# Patient Record
Sex: MALE | Race: WHITE | NOT HISPANIC OR LATINO
[De-identification: names, ages, dates, MRNs, and addresses within clinical notes are randomized per-mention and may not be internally consistent; named-entity substitution may affect disease eponyms.]

---

## 2018-01-16 ENCOUNTER — APPOINTMENT (OUTPATIENT)
Dept: ORTHOPEDIC SURGERY | Facility: CLINIC | Age: 68
End: 2018-01-16

## 2020-05-17 ENCOUNTER — INPATIENT (INPATIENT)
Facility: HOSPITAL | Age: 70
LOS: 1 days | Discharge: HOME HEALTH | DRG: 247 | End: 2020-05-19
Attending: HOSPITALIST | Admitting: HOSPITALIST
Payer: MEDICARE

## 2020-05-17 VITALS — WEIGHT: 229.94 LBS | HEIGHT: 73 IN

## 2020-05-17 DIAGNOSIS — I10 ESSENTIAL (PRIMARY) HYPERTENSION: ICD-10-CM

## 2020-05-17 DIAGNOSIS — Z98.890 OTHER SPECIFIED POSTPROCEDURAL STATES: ICD-10-CM

## 2020-05-17 DIAGNOSIS — I25.84 CORONARY ATHEROSCLEROSIS DUE TO CALCIFIED CORONARY LESION: ICD-10-CM

## 2020-05-17 DIAGNOSIS — Z88.6 ALLERGY STATUS TO ANALGESIC AGENT: ICD-10-CM

## 2020-05-17 DIAGNOSIS — Z98.890 OTHER SPECIFIED POSTPROCEDURAL STATES: Chronic | ICD-10-CM

## 2020-05-17 DIAGNOSIS — N40.0 BENIGN PROSTATIC HYPERPLASIA WITHOUT LOWER URINARY TRACT SYMPTOMS: ICD-10-CM

## 2020-05-17 DIAGNOSIS — D72.829 ELEVATED WHITE BLOOD CELL COUNT, UNSPECIFIED: ICD-10-CM

## 2020-05-17 DIAGNOSIS — R07.9 CHEST PAIN, UNSPECIFIED: ICD-10-CM

## 2020-05-17 DIAGNOSIS — I21.4 NON-ST ELEVATION (NSTEMI) MYOCARDIAL INFARCTION: ICD-10-CM

## 2020-05-17 DIAGNOSIS — Z72.0 TOBACCO USE: ICD-10-CM

## 2020-05-17 DIAGNOSIS — Z86.73 PERSONAL HISTORY OF TRANSIENT ISCHEMIC ATTACK (TIA), AND CEREBRAL INFARCTION WITHOUT RESIDUAL DEFICITS: ICD-10-CM

## 2020-05-17 DIAGNOSIS — R10.13 EPIGASTRIC PAIN: ICD-10-CM

## 2020-05-17 DIAGNOSIS — E78.5 HYPERLIPIDEMIA, UNSPECIFIED: ICD-10-CM

## 2020-05-17 DIAGNOSIS — I25.118 ATHEROSCLEROTIC HEART DISEASE OF NATIVE CORONARY ARTERY WITH OTHER FORMS OF ANGINA PECTORIS: ICD-10-CM

## 2020-05-17 LAB
ALBUMIN SERPL ELPH-MCNC: 3.9 G/DL — SIGNIFICANT CHANGE UP (ref 3.3–5)
ALP SERPL-CCNC: 86 U/L — SIGNIFICANT CHANGE UP (ref 40–120)
ALT FLD-CCNC: 29 U/L — SIGNIFICANT CHANGE UP (ref 12–78)
ANION GAP SERPL CALC-SCNC: 10 MMOL/L — SIGNIFICANT CHANGE UP (ref 5–17)
APPEARANCE UR: CLEAR — SIGNIFICANT CHANGE UP
APTT BLD: 37.9 SEC — HIGH (ref 27.5–36.3)
AST SERPL-CCNC: 25 U/L — SIGNIFICANT CHANGE UP (ref 15–37)
BASOPHILS # BLD AUTO: 0.05 K/UL — SIGNIFICANT CHANGE UP (ref 0–0.2)
BASOPHILS NFR BLD AUTO: 0.5 % — SIGNIFICANT CHANGE UP (ref 0–2)
BILIRUB SERPL-MCNC: 0.8 MG/DL — SIGNIFICANT CHANGE UP (ref 0.2–1.2)
BILIRUB UR-MCNC: NEGATIVE — SIGNIFICANT CHANGE UP
BUN SERPL-MCNC: 17 MG/DL — SIGNIFICANT CHANGE UP (ref 7–23)
CALCIUM SERPL-MCNC: 9.1 MG/DL — SIGNIFICANT CHANGE UP (ref 8.5–10.1)
CHLORIDE SERPL-SCNC: 105 MMOL/L — SIGNIFICANT CHANGE UP (ref 96–108)
CO2 SERPL-SCNC: 22 MMOL/L — SIGNIFICANT CHANGE UP (ref 22–31)
COLOR SPEC: YELLOW — SIGNIFICANT CHANGE UP
CREAT SERPL-MCNC: 0.84 MG/DL — SIGNIFICANT CHANGE UP (ref 0.5–1.3)
D DIMER BLD IA.RAPID-MCNC: <150 NG/ML DDU — SIGNIFICANT CHANGE UP
DIFF PNL FLD: NEGATIVE — SIGNIFICANT CHANGE UP
EOSINOPHIL # BLD AUTO: 0.31 K/UL — SIGNIFICANT CHANGE UP (ref 0–0.5)
EOSINOPHIL NFR BLD AUTO: 2.8 % — SIGNIFICANT CHANGE UP (ref 0–6)
GLUCOSE SERPL-MCNC: 108 MG/DL — HIGH (ref 70–99)
GLUCOSE UR QL: NEGATIVE MG/DL — SIGNIFICANT CHANGE UP
HCT VFR BLD CALC: 47.4 % — SIGNIFICANT CHANGE UP (ref 39–50)
HGB BLD-MCNC: 16.2 G/DL — SIGNIFICANT CHANGE UP (ref 13–17)
IMM GRANULOCYTES NFR BLD AUTO: 0.4 % — SIGNIFICANT CHANGE UP (ref 0–1.5)
INR BLD: 1.03 RATIO — SIGNIFICANT CHANGE UP (ref 0.88–1.16)
KETONES UR-MCNC: NEGATIVE — SIGNIFICANT CHANGE UP
LEUKOCYTE ESTERASE UR-ACNC: NEGATIVE — SIGNIFICANT CHANGE UP
LYMPHOCYTES # BLD AUTO: 3.96 K/UL — HIGH (ref 1–3.3)
LYMPHOCYTES # BLD AUTO: 36 % — SIGNIFICANT CHANGE UP (ref 13–44)
MCHC RBC-ENTMCNC: 30.6 PG — SIGNIFICANT CHANGE UP (ref 27–34)
MCHC RBC-ENTMCNC: 34.2 GM/DL — SIGNIFICANT CHANGE UP (ref 32–36)
MCV RBC AUTO: 89.6 FL — SIGNIFICANT CHANGE UP (ref 80–100)
MONOCYTES # BLD AUTO: 0.87 K/UL — SIGNIFICANT CHANGE UP (ref 0–0.9)
MONOCYTES NFR BLD AUTO: 7.9 % — SIGNIFICANT CHANGE UP (ref 2–14)
NEUTROPHILS # BLD AUTO: 5.76 K/UL — SIGNIFICANT CHANGE UP (ref 1.8–7.4)
NEUTROPHILS NFR BLD AUTO: 52.4 % — SIGNIFICANT CHANGE UP (ref 43–77)
NITRITE UR-MCNC: NEGATIVE — SIGNIFICANT CHANGE UP
PH UR: 7 — SIGNIFICANT CHANGE UP (ref 5–8)
PLATELET # BLD AUTO: 196 K/UL — SIGNIFICANT CHANGE UP (ref 150–400)
POTASSIUM SERPL-MCNC: 3.5 MMOL/L — SIGNIFICANT CHANGE UP (ref 3.5–5.3)
POTASSIUM SERPL-SCNC: 3.5 MMOL/L — SIGNIFICANT CHANGE UP (ref 3.5–5.3)
PROT SERPL-MCNC: 7.9 GM/DL — SIGNIFICANT CHANGE UP (ref 6–8.3)
PROT UR-MCNC: NEGATIVE MG/DL — SIGNIFICANT CHANGE UP
PROTHROM AB SERPL-ACNC: 11.4 SEC — SIGNIFICANT CHANGE UP (ref 10–12.9)
RBC # BLD: 5.29 M/UL — SIGNIFICANT CHANGE UP (ref 4.2–5.8)
RBC # FLD: 13.2 % — SIGNIFICANT CHANGE UP (ref 10.3–14.5)
SARS-COV-2 RNA SPEC QL NAA+PROBE: SIGNIFICANT CHANGE UP
SODIUM SERPL-SCNC: 137 MMOL/L — SIGNIFICANT CHANGE UP (ref 135–145)
SP GR SPEC: 1.01 — SIGNIFICANT CHANGE UP (ref 1.01–1.02)
TROPONIN I SERPL-MCNC: 0.08 NG/ML — HIGH (ref 0.01–0.04)
TROPONIN I SERPL-MCNC: 3.01 NG/ML — HIGH (ref 0.01–0.04)
UROBILINOGEN FLD QL: NEGATIVE MG/DL — SIGNIFICANT CHANGE UP
WBC # BLD: 10.99 K/UL — HIGH (ref 3.8–10.5)
WBC # FLD AUTO: 10.99 K/UL — HIGH (ref 3.8–10.5)

## 2020-05-17 PROCEDURE — 85610 PROTHROMBIN TIME: CPT

## 2020-05-17 PROCEDURE — 92978 ENDOLUMINL IVUS OCT C 1ST: CPT | Mod: RC

## 2020-05-17 PROCEDURE — 80053 COMPREHEN METABOLIC PANEL: CPT

## 2020-05-17 PROCEDURE — 71045 X-RAY EXAM CHEST 1 VIEW: CPT | Mod: 26

## 2020-05-17 PROCEDURE — C1894: CPT

## 2020-05-17 PROCEDURE — 83036 HEMOGLOBIN GLYCOSYLATED A1C: CPT

## 2020-05-17 PROCEDURE — C1887: CPT

## 2020-05-17 PROCEDURE — C9600: CPT | Mod: RC

## 2020-05-17 PROCEDURE — 83735 ASSAY OF MAGNESIUM: CPT

## 2020-05-17 PROCEDURE — 93010 ELECTROCARDIOGRAM REPORT: CPT

## 2020-05-17 PROCEDURE — 86900 BLOOD TYPING SEROLOGIC ABO: CPT

## 2020-05-17 PROCEDURE — 93458 L HRT ARTERY/VENTRICLE ANGIO: CPT | Mod: 59

## 2020-05-17 PROCEDURE — 86901 BLOOD TYPING SEROLOGIC RH(D): CPT

## 2020-05-17 PROCEDURE — C1725: CPT

## 2020-05-17 PROCEDURE — 82550 ASSAY OF CK (CPK): CPT

## 2020-05-17 PROCEDURE — 93005 ELECTROCARDIOGRAM TRACING: CPT

## 2020-05-17 PROCEDURE — 80048 BASIC METABOLIC PNL TOTAL CA: CPT

## 2020-05-17 PROCEDURE — 86803 HEPATITIS C AB TEST: CPT

## 2020-05-17 PROCEDURE — 93306 TTE W/DOPPLER COMPLETE: CPT

## 2020-05-17 PROCEDURE — 85730 THROMBOPLASTIN TIME PARTIAL: CPT

## 2020-05-17 PROCEDURE — C1753: CPT

## 2020-05-17 PROCEDURE — 85025 COMPLETE CBC W/AUTO DIFF WBC: CPT

## 2020-05-17 PROCEDURE — 99222 1ST HOSP IP/OBS MODERATE 55: CPT

## 2020-05-17 PROCEDURE — C1874: CPT

## 2020-05-17 PROCEDURE — C1769: CPT

## 2020-05-17 PROCEDURE — 85027 COMPLETE CBC AUTOMATED: CPT

## 2020-05-17 PROCEDURE — C1757: CPT

## 2020-05-17 PROCEDURE — 36415 COLL VENOUS BLD VENIPUNCTURE: CPT

## 2020-05-17 PROCEDURE — 86850 RBC ANTIBODY SCREEN: CPT

## 2020-05-17 PROCEDURE — 84484 ASSAY OF TROPONIN QUANT: CPT

## 2020-05-17 PROCEDURE — 80061 LIPID PANEL: CPT

## 2020-05-17 RX ORDER — CLOPIDOGREL BISULFATE 75 MG/1
75 TABLET, FILM COATED ORAL DAILY
Refills: 0 | Status: DISCONTINUED | OUTPATIENT
Start: 2020-05-17 | End: 2020-05-19

## 2020-05-17 RX ORDER — PANTOPRAZOLE SODIUM 20 MG/1
40 TABLET, DELAYED RELEASE ORAL
Refills: 0 | Status: DISCONTINUED | OUTPATIENT
Start: 2020-05-17 | End: 2020-05-19

## 2020-05-17 RX ORDER — SIMETHICONE 80 MG/1
80 TABLET, CHEWABLE ORAL EVERY 8 HOURS
Refills: 0 | Status: DISCONTINUED | OUTPATIENT
Start: 2020-05-17 | End: 2020-05-19

## 2020-05-17 RX ORDER — ENOXAPARIN SODIUM 100 MG/ML
100 INJECTION SUBCUTANEOUS ONCE
Refills: 0 | Status: COMPLETED | OUTPATIENT
Start: 2020-05-17 | End: 2020-05-18

## 2020-05-17 RX ORDER — ACETAMINOPHEN 500 MG
650 TABLET ORAL ONCE
Refills: 0 | Status: DISCONTINUED | OUTPATIENT
Start: 2020-05-17 | End: 2020-05-19

## 2020-05-17 RX ORDER — FLUOXETINE HCL 10 MG
20 CAPSULE ORAL DAILY
Refills: 0 | Status: DISCONTINUED | OUTPATIENT
Start: 2020-05-17 | End: 2020-05-19

## 2020-05-17 RX ORDER — ONDANSETRON 8 MG/1
4 TABLET, FILM COATED ORAL ONCE
Refills: 0 | Status: DISCONTINUED | OUTPATIENT
Start: 2020-05-17 | End: 2020-05-19

## 2020-05-17 RX ORDER — AMLODIPINE BESYLATE 2.5 MG/1
5 TABLET ORAL DAILY
Refills: 0 | Status: DISCONTINUED | OUTPATIENT
Start: 2020-05-17 | End: 2020-05-18

## 2020-05-17 RX ORDER — PANTOPRAZOLE SODIUM 20 MG/1
40 TABLET, DELAYED RELEASE ORAL ONCE
Refills: 0 | Status: COMPLETED | OUTPATIENT
Start: 2020-05-17 | End: 2020-05-17

## 2020-05-17 RX ORDER — TAMSULOSIN HYDROCHLORIDE 0.4 MG/1
0.4 CAPSULE ORAL AT BEDTIME
Refills: 0 | Status: DISCONTINUED | OUTPATIENT
Start: 2020-05-17 | End: 2020-05-19

## 2020-05-17 RX ADMIN — CLOPIDOGREL BISULFATE 75 MILLIGRAM(S): 75 TABLET, FILM COATED ORAL at 22:27

## 2020-05-17 RX ADMIN — TAMSULOSIN HYDROCHLORIDE 0.4 MILLIGRAM(S): 0.4 CAPSULE ORAL at 22:27

## 2020-05-17 RX ADMIN — PANTOPRAZOLE SODIUM 40 MILLIGRAM(S): 20 TABLET, DELAYED RELEASE ORAL at 18:38

## 2020-05-17 NOTE — ED ADULT NURSE NOTE - OBJECTIVE STATEMENT
chest pressure with labored breathing and belching which alleviated pressure, 75 minutes PTA.  +nausea, no diaphoresis.  5/10, felt like indigestion. Patient states he had 2 beers and was smoking a cigar at time of event.  He had a similar experience two weeks ago that resolved spontaneously after 1/2 hour.  He has been active during the quarantine time, practicing MMA, going for walks.  He has a h/o MVR in 2001 and stroke.  ASA recently stopped d/t possible allergy.

## 2020-05-17 NOTE — ED PROVIDER NOTE - NEUROLOGICAL, MLM
Alert and oriented, no focal deficits, no motor or sensory deficits. Alert and oriented, no focal deficits, no motor or sensory deficits. CNs 2-12 grossly. NIH=0 GCS=15

## 2020-05-17 NOTE — ED PROVIDER NOTE - OBJECTIVE STATEMENT
68 y/o male with a PMHx of stroke in 2001, was on coumadin but has since been switched to baby aspirin. However, baby aspirin DC'd 2 years ago for possible allergic rxn as pt was tongue swelling which has resolved. Pt also notes started taking Cialis qd for ED 1 month ago. Additional PMHx include BPH, HTN, and mitral valve repair. Pt presents to ED c/o chest pressure. Pt was sitting at home 1 + 1/2 hours ago drinking and smoking when he noted onset of chest discomfort, SOB, nausea, belching, and mouth watering. Pt had similar discomfort approximately 2 weeks ago that resolved after 30 minutes. However, this discomfort has persisted. Pt notes that he feels slightly better after beliching. Pain 5/10, and remains unchanged from shortly after pain began. Pt also notes abd distension. Pt had a stress test many years ago. PMD - Dr. Miller. Pt denies LE edema, HA, lightheaded, vomiting, diarrhea, and fever. 68 y/o male with a PMHx of stroke in 2001, was on coumadin but has since been switched to baby aspirin. However, baby aspirin DC'd 2 years ago for possible allergic rxn as pt was tongue swelling which has resolved. Pt also notes started taking Cialis qd for ED 1 month ago. Additional PMHx include BPH, HTN, and mitral valve repair. Pt presents to ED c/o chest pressure. Pt was sitting at home 1 + 1/2 hours ago drinking and smoking when he noted onset of chest discomfort, SOB, nausea, belching. Pt had similar discomfort approximately 2 weeks ago that resolved after 30 minutes. However, this discomfort has persisted. Pt notes that he feels slightly better after beliching. Pain 5/10, and remains unchanged from shortly after pain began. pt also note salivation associated with symptoms. Pt had a stress test many years ago. PMD - Dr. Miller. Pt denies LE edema, HA, lightheaded, vomiting, diarrhea, and fever.

## 2020-05-17 NOTE — ED PROVIDER NOTE - NS_ ATTENDINGSCRIBEDETAILS _ED_A_ED_FT
I Rodolfo Romo MD saw and examined the patient. Scribe documented for me and under my supervision. I have modified the scribe's documentation where necessary to reflect my history, physical exam and other relevant documentations pertinent to the care of the patient.

## 2020-05-17 NOTE — H&P ADULT - HISTORY OF PRESENT ILLNESS
69 year old male patient with pertinent PMH of mitral valve repair(1998) presented to the ED complaining of substernal chest pressure that was constant and associated with some mild shortness of breath, nausea, belching and bloating. Patient denies any aggravating or alleviating factors. Patient endorses having 2 beers and having a cigar prior to the onset of pressure. Denies any palpitations, presyncope, headache or dizziness.      In the ED patient was found to have a troponin of 0.083

## 2020-05-17 NOTE — H&P ADULT - ASSESSMENT
69 year old male patient with chest pressure found to elevated troponin      -Admit to Tele      # Chest pain  -ACS, GI, musculoskeletal  -monitor on tele  -trend troponins  -serial EKGs  -give plavix in light of asa allergy  -get morning echo  -cardiology to evaluate    # Leukocytosis  -no focal signs of infection    #HTN  -on amlodipine    #Dyspepsia  -give protonix and simethicone    # BPH  -on tamsulosin    #DVT ppx  -encourage ambulation    #Disposition  -likely discharge after above workup 69 year old male patient with chest pressure found to elevated troponin      -Admit to Tele      # Chest pain  -ACS, GI, musculoskeletal  -monitor on tele  -trend troponins  -serial EKGs  -give plavix in light of asa allergy  -get morning echo  -cardiology to evaluate    #NSTEMI  -troponin trending up  -start full dose anticoagulation  -get serial troponin  -will make npo for possible cardiac intervention    # Leukocytosis  -no focal signs of infection    #HTN  -on amlodipine    #Dyspepsia  -give protonix and simethicone    # BPH  -on tamsulosin    #DVT ppx  -encourage ambulation    #Disposition  -likely discharge after above workup

## 2020-05-17 NOTE — H&P ADULT - NSHPPHYSICALEXAM_GEN_ALL_CORE
Vital Signs Last 24 Hrs  T(C): 36.8 (17 May 2020 21:50), Max: 36.8 (17 May 2020 21:50)  T(F): 98.3 (17 May 2020 21:50), Max: 98.3 (17 May 2020 21:50)  HR: 71 (17 May 2020 21:50) (71 - 80)  BP: 166/88 (17 May 2020 21:50) (166/88 - 166/99)  BP(mean): 117 (17 May 2020 17:27) (117 - 117)  RR: 16 (17 May 2020 21:50) (16 - 17)  SpO2: 100% (17 May 2020 21:50) (97% - 100%)

## 2020-05-17 NOTE — ED ADULT TRIAGE NOTE - CHIEF COMPLAINT QUOTE
pt presents to ED with complaints of chest pressure x 1 hr PTA. pt also endorses belching. pt appears anxious in triage. no meds PTA. pt presents to ED with complaints of chest pressure x 1 hr PTA. pt also endorses belching. pt appears anxious in triage. no meds PTA. EKG to be performed in triage.

## 2020-05-17 NOTE — ED ADULT NURSE NOTE - CHIEF COMPLAINT QUOTE
pt presents to ED with complaints of chest pressure x 1 hr PTA. pt also endorses belching. pt appears anxious in triage. no meds PTA. EKG to be performed in triage.

## 2020-05-17 NOTE — ED PROVIDER NOTE - ATTENDING CONTRIBUTION TO CARE
I Rodolfo Romo MD saw and examined the patient. MLP saw and examined the patient under my supervision. I discussed the care of the patient with MLP and agree with MLP's plan, assessment and care of the patient while in the ED.

## 2020-05-17 NOTE — ED PROVIDER NOTE - PROGRESS NOTE DETAILS
signed Maria Teresa Peña PA-C   Pt with chest discomfort "indigestion", SOB, nausea PTA, elevated troponin. Pt unable to take ASA since he had been having intermittent tongue swelling for the past few months which resolved when he and his PMD stopped his daily baby Aspirin. Will admit to telemetry. Pt agrees with admission and plan of care. Case discussed with and admission accepted by hospitalist Dr. Giraldo.

## 2020-05-17 NOTE — ED PROVIDER NOTE - CLINICAL SUMMARY MEDICAL DECISION MAKING FREE TEXT BOX
70 y/o male with chest discomfort, nausea, SOB, belching, and hypersalivation. Plan - COVID swab, labs, CXR, EKG, trop, D-dimer, admit.

## 2020-05-18 DIAGNOSIS — R01.1 CARDIAC MURMUR, UNSPECIFIED: ICD-10-CM

## 2020-05-18 DIAGNOSIS — I10 ESSENTIAL (PRIMARY) HYPERTENSION: ICD-10-CM

## 2020-05-18 DIAGNOSIS — I21.4 NON-ST ELEVATION (NSTEMI) MYOCARDIAL INFARCTION: ICD-10-CM

## 2020-05-18 LAB
ADD ON TEST-SPECIMEN IN LAB: SIGNIFICANT CHANGE UP
ADD ON TEST-SPECIMEN IN LAB: SIGNIFICANT CHANGE UP
ALBUMIN SERPL ELPH-MCNC: 3.5 G/DL — SIGNIFICANT CHANGE UP (ref 3.3–5)
ALP SERPL-CCNC: 69 U/L — SIGNIFICANT CHANGE UP (ref 40–120)
ALT FLD-CCNC: 27 U/L — SIGNIFICANT CHANGE UP (ref 12–78)
ANION GAP SERPL CALC-SCNC: 7 MMOL/L — SIGNIFICANT CHANGE UP (ref 5–17)
APTT BLD: 42.3 SEC — HIGH (ref 27.5–36.3)
AST SERPL-CCNC: 64 U/L — HIGH (ref 15–37)
BASOPHILS # BLD AUTO: 0.02 K/UL — SIGNIFICANT CHANGE UP (ref 0–0.2)
BASOPHILS NFR BLD AUTO: 0.2 % — SIGNIFICANT CHANGE UP (ref 0–2)
BILIRUB SERPL-MCNC: 0.9 MG/DL — SIGNIFICANT CHANGE UP (ref 0.2–1.2)
BUN SERPL-MCNC: 14 MG/DL — SIGNIFICANT CHANGE UP (ref 7–23)
CALCIUM SERPL-MCNC: 8.9 MG/DL — SIGNIFICANT CHANGE UP (ref 8.5–10.1)
CHLORIDE SERPL-SCNC: 108 MMOL/L — SIGNIFICANT CHANGE UP (ref 96–108)
CO2 SERPL-SCNC: 26 MMOL/L — SIGNIFICANT CHANGE UP (ref 22–31)
CREAT SERPL-MCNC: 0.7 MG/DL — SIGNIFICANT CHANGE UP (ref 0.5–1.3)
EOSINOPHIL # BLD AUTO: 0.03 K/UL — SIGNIFICANT CHANGE UP (ref 0–0.5)
EOSINOPHIL NFR BLD AUTO: 0.3 % — SIGNIFICANT CHANGE UP (ref 0–6)
GLUCOSE SERPL-MCNC: 121 MG/DL — HIGH (ref 70–99)
HCT VFR BLD CALC: 43.5 % — SIGNIFICANT CHANGE UP (ref 39–50)
HCV AB S/CO SERPL IA: 0.37 S/CO — SIGNIFICANT CHANGE UP (ref 0–0.99)
HCV AB SERPL-IMP: SIGNIFICANT CHANGE UP
HGB BLD-MCNC: 14.5 G/DL — SIGNIFICANT CHANGE UP (ref 13–17)
IMM GRANULOCYTES NFR BLD AUTO: 0.3 % — SIGNIFICANT CHANGE UP (ref 0–1.5)
INR BLD: 1.17 RATIO — HIGH (ref 0.88–1.16)
LYMPHOCYTES # BLD AUTO: 1.63 K/UL — SIGNIFICANT CHANGE UP (ref 1–3.3)
LYMPHOCYTES # BLD AUTO: 15.9 % — SIGNIFICANT CHANGE UP (ref 13–44)
MCHC RBC-ENTMCNC: 30.1 PG — SIGNIFICANT CHANGE UP (ref 27–34)
MCHC RBC-ENTMCNC: 33.3 GM/DL — SIGNIFICANT CHANGE UP (ref 32–36)
MCV RBC AUTO: 90.2 FL — SIGNIFICANT CHANGE UP (ref 80–100)
MONOCYTES # BLD AUTO: 0.8 K/UL — SIGNIFICANT CHANGE UP (ref 0–0.9)
MONOCYTES NFR BLD AUTO: 7.8 % — SIGNIFICANT CHANGE UP (ref 2–14)
NEUTROPHILS # BLD AUTO: 7.73 K/UL — HIGH (ref 1.8–7.4)
NEUTROPHILS NFR BLD AUTO: 75.5 % — SIGNIFICANT CHANGE UP (ref 43–77)
PLATELET # BLD AUTO: 165 K/UL — SIGNIFICANT CHANGE UP (ref 150–400)
POTASSIUM SERPL-MCNC: 3.8 MMOL/L — SIGNIFICANT CHANGE UP (ref 3.5–5.3)
POTASSIUM SERPL-SCNC: 3.8 MMOL/L — SIGNIFICANT CHANGE UP (ref 3.5–5.3)
PROT SERPL-MCNC: 7 GM/DL — SIGNIFICANT CHANGE UP (ref 6–8.3)
PROTHROM AB SERPL-ACNC: 13.1 SEC — HIGH (ref 10–12.9)
RBC # BLD: 4.82 M/UL — SIGNIFICANT CHANGE UP (ref 4.2–5.8)
RBC # FLD: 13.2 % — SIGNIFICANT CHANGE UP (ref 10.3–14.5)
SODIUM SERPL-SCNC: 141 MMOL/L — SIGNIFICANT CHANGE UP (ref 135–145)
TROPONIN I SERPL-MCNC: 5.18 NG/ML — HIGH (ref 0.01–0.04)
WBC # BLD: 10.24 K/UL — SIGNIFICANT CHANGE UP (ref 3.8–10.5)
WBC # FLD AUTO: 10.24 K/UL — SIGNIFICANT CHANGE UP (ref 3.8–10.5)

## 2020-05-18 PROCEDURE — 99233 SBSQ HOSP IP/OBS HIGH 50: CPT | Mod: 25

## 2020-05-18 PROCEDURE — 99152 MOD SED SAME PHYS/QHP 5/>YRS: CPT

## 2020-05-18 PROCEDURE — 93306 TTE W/DOPPLER COMPLETE: CPT | Mod: 26

## 2020-05-18 PROCEDURE — 93010 ELECTROCARDIOGRAM REPORT: CPT | Mod: 76

## 2020-05-18 PROCEDURE — 93458 L HRT ARTERY/VENTRICLE ANGIO: CPT | Mod: 26,59

## 2020-05-18 PROCEDURE — 99233 SBSQ HOSP IP/OBS HIGH 50: CPT

## 2020-05-18 PROCEDURE — 99223 1ST HOSP IP/OBS HIGH 75: CPT

## 2020-05-18 PROCEDURE — 92941 PRQ TRLML REVSC TOT OCCL AMI: CPT | Mod: LD

## 2020-05-18 RX ORDER — SODIUM CHLORIDE 9 MG/ML
1000 INJECTION, SOLUTION INTRAVENOUS
Refills: 0 | Status: DISCONTINUED | OUTPATIENT
Start: 2020-05-18 | End: 2020-05-19

## 2020-05-18 RX ORDER — ATORVASTATIN CALCIUM 80 MG/1
40 TABLET, FILM COATED ORAL AT BEDTIME
Refills: 0 | Status: DISCONTINUED | OUTPATIENT
Start: 2020-05-18 | End: 2020-05-19

## 2020-05-18 RX ORDER — ASPIRIN/CALCIUM CARB/MAGNESIUM 324 MG
40 TABLET ORAL ONCE
Refills: 0 | Status: COMPLETED | OUTPATIENT
Start: 2020-05-18 | End: 2020-05-18

## 2020-05-18 RX ORDER — ASPIRIN/CALCIUM CARB/MAGNESIUM 324 MG
81 TABLET ORAL ONCE
Refills: 0 | Status: COMPLETED | OUTPATIENT
Start: 2020-05-18 | End: 2020-05-18

## 2020-05-18 RX ORDER — NITROGLYCERIN 6.5 MG
0.4 CAPSULE, EXTENDED RELEASE ORAL
Refills: 0 | Status: DISCONTINUED | OUTPATIENT
Start: 2020-05-18 | End: 2020-05-19

## 2020-05-18 RX ORDER — METOPROLOL TARTRATE 50 MG
12.5 TABLET ORAL
Refills: 0 | Status: DISCONTINUED | OUTPATIENT
Start: 2020-05-18 | End: 2020-05-19

## 2020-05-18 RX ORDER — ASPIRIN/CALCIUM CARB/MAGNESIUM 324 MG
162 TABLET ORAL ONCE
Refills: 0 | Status: COMPLETED | OUTPATIENT
Start: 2020-05-18 | End: 2020-05-18

## 2020-05-18 RX ORDER — ASPIRIN/CALCIUM CARB/MAGNESIUM 324 MG
325 TABLET ORAL ONCE
Refills: 0 | Status: COMPLETED | OUTPATIENT
Start: 2020-05-18 | End: 2020-05-18

## 2020-05-18 RX ORDER — ASPIRIN/CALCIUM CARB/MAGNESIUM 324 MG
81 TABLET ORAL DAILY
Refills: 0 | Status: DISCONTINUED | OUTPATIENT
Start: 2020-05-19 | End: 2020-05-19

## 2020-05-18 RX ORDER — LANOLIN ALCOHOL/MO/W.PET/CERES
3 CREAM (GRAM) TOPICAL AT BEDTIME
Refills: 0 | Status: DISCONTINUED | OUTPATIENT
Start: 2020-05-18 | End: 2020-05-19

## 2020-05-18 RX ADMIN — AMLODIPINE BESYLATE 5 MILLIGRAM(S): 2.5 TABLET ORAL at 05:36

## 2020-05-18 RX ADMIN — Medication 325 MILLIGRAM(S): at 14:38

## 2020-05-18 RX ADMIN — Medication 162 MILLIGRAM(S): at 14:17

## 2020-05-18 RX ADMIN — ENOXAPARIN SODIUM 100 MILLIGRAM(S): 100 INJECTION SUBCUTANEOUS at 00:16

## 2020-05-18 RX ADMIN — TAMSULOSIN HYDROCHLORIDE 0.4 MILLIGRAM(S): 0.4 CAPSULE ORAL at 21:39

## 2020-05-18 RX ADMIN — ATORVASTATIN CALCIUM 40 MILLIGRAM(S): 80 TABLET, FILM COATED ORAL at 21:39

## 2020-05-18 RX ADMIN — Medication 12.5 MILLIGRAM(S): at 18:45

## 2020-05-18 RX ADMIN — Medication 0.4 MILLIGRAM(S): at 02:21

## 2020-05-18 RX ADMIN — SODIUM CHLORIDE 75 MILLILITER(S): 9 INJECTION, SOLUTION INTRAVENOUS at 13:00

## 2020-05-18 RX ADMIN — Medication 40 MILLIGRAM(S): at 13:48

## 2020-05-18 RX ADMIN — PANTOPRAZOLE SODIUM 40 MILLIGRAM(S): 20 TABLET, DELAYED RELEASE ORAL at 05:36

## 2020-05-18 RX ADMIN — Medication 3 MILLIGRAM(S): at 21:39

## 2020-05-18 RX ADMIN — Medication 81 MILLIGRAM(S): at 14:04

## 2020-05-18 RX ADMIN — Medication 20 MILLIGRAM(S): at 18:46

## 2020-05-18 NOTE — CHART NOTE - NSCHARTNOTEFT_GEN_A_CORE
Rt radial hemoband removed & direct pressure applied for 15 min then good hemostasis obtained.  Rt wrist: no hematoma, 2+ radial pulses. dressing applied.  Pt denies any discomfort, VSS.

## 2020-05-18 NOTE — PROGRESS NOTE ADULT - SUBJECTIVE AND OBJECTIVE BOX
Cath Lab Nurse Practitioner Note  HPI:  69 year old male patient with pertinent PMH of mitral valve repair(1998) presented to the ED complaining of substernal chest pressure that was constant and associated with some mild shortness of breath, nausea, belching and bloating. Patient denies any aggravating or alleviating factors. Patient endorses having 2 beers and having a cigar prior to the onset of pressure. Denies any palpitations, presyncope, headache or dizziness.  Pt ruled in for NSTEMI with (+)trop.  Pt referred for LHC with possible intervention.  ASA: II  Creatinine: 0.7  GFR: 97  Calculated bleeding risk score: 1.0%    s/p LHC : JAH x2 to RCA  Pt denies chest pain/SOB/palpitations post cath.    Physical exam:  Vital Signs Last 24 Hrs  T(C): 36.7 (18 May 2020 08:53), Max: 36.9 (17 May 2020 23:30)  T(F): 98 (18 May 2020 08:53), Max: 98.5 (17 May 2020 23:30)  HR: 63 (18 May 2020 11:55) (63 - 80)  BP: 141/77 (18 May 2020 11:55) (134/76 - 177/89)  RR: 16 (18 May 2020 11:55) (16 - 18)  SpO2: 97% (18 May 2020 11:55) (97% - 100%)  Neuro: A&Ox3  Cardiac : (+)S1S2, RRR  Lungs: CTA B/L  Abd: soft, NT, (+)BS  Ext: Rt radial (+) hemoband device , 2+ Rt radial pulses    Labs:                        14.5   10.24 )-----------( 165      ( 18 May 2020 06:47 )             43.5     05-18    141  |  108  |  14  ----------------------------<  121<H>  3.8   |  26  |  0.70    Ca    8.9      18 May 2020 06:47    TPro  7.0  /  Alb  3.5  /  TBili  0.9  /  DBili  x   /  AST  64<H>  /  ALT  27  /  AlkPhos  69  05-18    PT/INR - ( 18 May 2020 06:47 )   PT: 13.1 sec;   INR: 1.17 ratio         PTT - ( 18 May 2020 06:47 )  PTT:42.3 sec  MEDICATIONS  (STANDING):  aspirin 325 milliGRAM(s) Oral once  aspirin  chewable 40 milliGRAM(s) Oral once  aspirin  chewable 81 milliGRAM(s) Oral once  aspirin  chewable 162 milliGRAM(s) Oral once  Aspirin 1mg/ml 0.1 milliGRAM(s) 0.1 milliGRAM(s) Oral once  Aspirin 1mg/ml 0.1 milliGRAM(s),aspirin 1mg/ml 0.3 milliGRAM(s) 0.3 milliGRAM(s) Oral once  Aspirin 1mg/ml 1 milliGRAM(s) 1 milliGRAM(s) Oral once  Aspirin 1mg/ml 10 milliGRAM(s) 10 milliGRAM(s) Oral once  Aspirin 1mg/ml 20 milliGRAM(s) 20 milliGRAM(s) Oral once  Aspirin 1mg/ml 3 milliGRAM(s) 3 milliGRAM(s) Oral once  clopidogrel Tablet 75 milliGRAM(s) Oral daily  FLUoxetine 20 milliGRAM(s) Oral daily  metoprolol tartrate 12.5 milliGRAM(s) Oral two times a day  pantoprazole    Tablet 40 milliGRAM(s) Oral before breakfast  sodium chloride 0.45%. 1000 milliLiter(s) (75 mL/Hr) IV Continuous <Continuous>  tamsulosin 0.4 milliGRAM(s) Oral at bedtime      A/P : CAD, NSTEMI, s/p JAH x2  to RCA  -monitor pt in CICU  -Radial hemoband to be removed at 13:40  -IV hydration  - start ASA desensitization then continue 81mg daily  -continue plavix/b-blocker  -pt is not on statin d/t h/o myalgia  -Discussed therapeutic lifestyle changes to reduce risk factors such as following a cardiac diet, weight loss, maintaining a healthy weight, exercise, smoking cessation, medication compliance, and regular follow-up  with MD to know our numbers (BP, cholesterol, weight, and glucose)  -f/u EKG/Labs/site check in AM  -d/c planning in AM if pt remains stable  -f/u with PMD in 1-2 weeks  -Plan discussed with pt/Dr. Frias/hospitalist Cath Lab Nurse Practitioner Note  HPI:  69 year old male patient with pertinent PMH of mitral valve repair(1998) presented to the ED complaining of substernal chest pressure that was constant and associated with some mild shortness of breath, nausea, belching and bloating. Patient denies any aggravating or alleviating factors. Patient endorses having 2 beers and having a cigar prior to the onset of pressure. Denies any palpitations, presyncope, headache or dizziness.  Pt ruled in for NSTEMI with (+)trop.  Pt referred for LHC with possible intervention.  ASA: II  Creatinine: 0.7  GFR: 97  Calculated bleeding risk score: 1.0%    s/p LHC : JAH x2 to RCA  Pt denies chest pain/SOB/palpitations post cath.    Physical exam:  Vital Signs Last 24 Hrs  T(C): 36.7 (18 May 2020 08:53), Max: 36.9 (17 May 2020 23:30)  T(F): 98 (18 May 2020 08:53), Max: 98.5 (17 May 2020 23:30)  HR: 63 (18 May 2020 11:55) (63 - 80)  BP: 141/77 (18 May 2020 11:55) (134/76 - 177/89)  RR: 16 (18 May 2020 11:55) (16 - 18)  SpO2: 97% (18 May 2020 11:55) (97% - 100%)  Neuro: A&Ox3  Cardiac : (+)S1S2, RRR  Lungs: CTA B/L  Abd: soft, NT, (+)BS  Ext: Rt radial (+) hemoband device , 2+ Rt radial pulses    Labs:                        14.5   10.24 )-----------( 165      ( 18 May 2020 06:47 )             43.5     05-18    141  |  108  |  14  ----------------------------<  121<H>  3.8   |  26  |  0.70    Ca    8.9      18 May 2020 06:47    TPro  7.0  /  Alb  3.5  /  TBili  0.9  /  DBili  x   /  AST  64<H>  /  ALT  27  /  AlkPhos  69  05-18    PT/INR - ( 18 May 2020 06:47 )   PT: 13.1 sec;   INR: 1.17 ratio         PTT - ( 18 May 2020 06:47 )  PTT:42.3 sec  MEDICATIONS  (STANDING):  aspirin 325 milliGRAM(s) Oral once  aspirin  chewable 40 milliGRAM(s) Oral once  aspirin  chewable 81 milliGRAM(s) Oral once  aspirin  chewable 162 milliGRAM(s) Oral once  Aspirin 1mg/ml 0.1 milliGRAM(s) 0.1 milliGRAM(s) Oral once  Aspirin 1mg/ml 0.1 milliGRAM(s),aspirin 1mg/ml 0.3 milliGRAM(s) 0.3 milliGRAM(s) Oral once  Aspirin 1mg/ml 1 milliGRAM(s) 1 milliGRAM(s) Oral once  Aspirin 1mg/ml 10 milliGRAM(s) 10 milliGRAM(s) Oral once  Aspirin 1mg/ml 20 milliGRAM(s) 20 milliGRAM(s) Oral once  Aspirin 1mg/ml 3 milliGRAM(s) 3 milliGRAM(s) Oral once  clopidogrel Tablet 75 milliGRAM(s) Oral daily  FLUoxetine 20 milliGRAM(s) Oral daily  metoprolol tartrate 12.5 milliGRAM(s) Oral two times a day  pantoprazole    Tablet 40 milliGRAM(s) Oral before breakfast  sodium chloride 0.45%. 1000 milliLiter(s) (75 mL/Hr) IV Continuous <Continuous>  tamsulosin 0.4 milliGRAM(s) Oral at bedtime      A/P : CAD, NSTEMI, s/p JAH x2  to RCA  -Radial hemoband to be removed at 13:40  -IV hydration  - start ASA desensitization to 325mg today then continue 81mg daily  -continue plavix/b-blocker  -pt is not on statin d/t h/o myalgia  -Discussed therapeutic lifestyle changes to reduce risk factors such as following a cardiac diet, weight loss, maintaining a healthy weight, exercise, smoking cessation, medication compliance, and regular follow-up  with MD to know our numbers (BP, cholesterol, weight, and glucose)  -f/u EKG/Labs/site check in AM  -d/c planning in AM if pt remains stable  -f/u with PMD in 1-2 weeks  -Plan discussed with pt/Dr. Frias/hospitalist

## 2020-05-18 NOTE — CONSULT NOTE ADULT - SUBJECTIVE AND OBJECTIVE BOX
69 year old male patient with pertinent PMH of mitral valve repair(1998)   presented to the ED complaining of substernal chest pressure   that was constant and associated with some mild shortness of breath, nausea, belching and bloating.   Patient denies any aggravating or alleviating factors.   Patient endorses having 2 beers and having a cigar prior to the onset of pressure. Denies   any palpitations, presyncope, headache or dizziness.    In the ED patient was found to have a troponin of 0.083 (17 May 2020 21:34)          PAST MEDICAL & SURGICAL HISTORY:  Cerebrovascular accident (CVA)  H/O mitral valve repair      Allergies    aspirin (Angioedema (Mild))    Intolerances        SOCIAL HISTORY:    FAMILY HISTORY:  FH: CAD (coronary artery disease)      MEDICATIONS:  MEDICATIONS  (STANDING):  amLODIPine   Tablet 5 milliGRAM(s) Oral daily  clopidogrel Tablet 75 milliGRAM(s) Oral daily  FLUoxetine 20 milliGRAM(s) Oral daily  pantoprazole    Tablet 40 milliGRAM(s) Oral before breakfast  tamsulosin 0.4 milliGRAM(s) Oral at bedtime    MEDICATIONS  (PRN):  acetaminophen   Tablet .. 650 milliGRAM(s) Oral once PRN Temp greater or equal to 38C (100.4F), Mild Pain (1 - 3)  melatonin 3 milliGRAM(s) Oral at bedtime PRN Insomnia  nitroglycerin     SubLingual 0.4 milliGRAM(s) SubLingual every 5 minutes PRN Chest Pain  ondansetron Injectable 4 milliGRAM(s) IV Push once PRN Nausea and/or Vomiting  simethicone 80 milliGRAM(s) Chew every 8 hours PRN Dyspepsia      REVIEW OF SYSTEMS:    CONSTITUTIONAL: No weakness, fevers or chills  EYES/ENT: No visual changes;  No vertigo or throat pain   NECK: No pain or stiffness  RESPIRATORY: No cough, wheezing, hemoptysis; No shortness of breath  CARDIOVASCULAR: No chest pain or palpitations  GASTROINTESTINAL: No abdominal or epigastric pain. No nausea, vomiting, or hematemesis; No diarrhea or constipation. No melena or hematochezia.  GENITOURINARY: No dysuria, frequency or hematuria  NEUROLOGICAL: No numbness or weakness  SKIN: No itching, burning, rashes, or lesions   All other review of systems is negative unless indicated above    Vital Signs Last 24 Hrs  T(C): 36.9 (18 May 2020 05:26), Max: 36.9 (17 May 2020 23:30)  T(F): 98.4 (18 May 2020 05:26), Max: 98.5 (17 May 2020 23:30)  HR: 64 (18 May 2020 05:26) (64 - 80)  BP: 134/76 (18 May 2020 05:26) (134/76 - 166/99)  BP(mean): 117 (17 May 2020 17:27) (117 - 117)  RR: 17 (18 May 2020 05:26) (16 - 17)  SpO2: 97% (18 May 2020 05:26) (97% - 100%)    I&O's Summary      PHYSICAL EXAM:    Constitutional: NAD, awake and alert, well-developed  HEENT: PERR, EOMI,  No oral cyananosis.  Neck:  supple,  No JVD  Respiratory: Breath sounds are clear bilaterally, No wheezing, rales or rhonchi  Cardiovascular: S1 and S2, regular rate and rhythm, no Murmurs, gallops or rubs  Gastrointestinal: Bowel Sounds present, soft, nontender.   Extremities: No peripheral edema. No clubbing or cyanosis.  Vascular: 2+ peripheral pulses  Neurological: A/O x 3, no focal deficits  Musculoskeletal: no calf tenderness.  Skin: No rashes.      LABS: All Labs Reviewed:                        14.5   10.24 )-----------( 165      ( 18 May 2020 06:47 )             43.5                         16.2   10.99 )-----------( 196      ( 17 May 2020 17:36 )             47.4     18 May 2020 06:47    141    |  108    |  14     ----------------------------<  121    3.8     |  26     |  0.70   17 May 2020 17:36    137    |  105    |  17     ----------------------------<  108    3.5     |  22     |  0.84     Ca    8.9        18 May 2020 06:47  Ca    9.1        17 May 2020 17:36    TPro  7.0    /  Alb  3.5    /  TBili  0.9    /  DBili  x      /  AST  64     /  ALT  27     /  AlkPhos  69     18 May 2020 06:47  TPro  7.9    /  Alb  3.9    /  TBili  0.8    /  DBili  x      /  AST  25     /  ALT  29     /  AlkPhos  86     17 May 2020 17:36    PT/INR - ( 18 May 2020 06:47 )   PT: 13.1 sec;   INR: 1.17 ratio         PTT - ( 18 May 2020 06:47 )  PTT:42.3 sec  CARDIAC MARKERS ( 18 May 2020 01:23 )  5.180 ng/mL / x     / x     / x     / x      CARDIAC MARKERS ( 17 May 2020 22:48 )  3.010 ng/mL / x     / x     / x     / x      CARDIAC MARKERS ( 17 May 2020 17:36 )  0.083 ng/mL / x     / x     / x     / x          Blood Culture:         RADIOLOGY/EKG: 69 year old male patient with pertinent PMH of mitral valve repair(1998)   presented to the ED complaining of substernal chest pressure   that was constant and associated with some mild shortness of breath, nausea, belching and bloating.   Patient denies any aggravating or alleviating factors.   Patient endorses having 2 beers and having a cigar prior to the onset of pressure. Denies   any palpitations, presyncope, headache or dizziness.    In the ED patient was found to have a troponin of 0.083 (17 May 2020 21:34)        PAST MEDICAL & SURGICAL HISTORY:  Cerebrovascular accident (CVA)  H/O mitral valve repair      Allergies  aspirin (Angioedema (Mild))  Intolerances      SOCIAL HISTORY:    FAMILY HISTORY:  FH: CAD (coronary artery disease)      MEDICATIONS:  MEDICATIONS  (STANDING):  amLODIPine   Tablet 5 milliGRAM(s) Oral daily  clopidogrel Tablet 75 milliGRAM(s) Oral daily  FLUoxetine 20 milliGRAM(s) Oral daily  pantoprazole    Tablet 40 milliGRAM(s) Oral before breakfast  tamsulosin 0.4 milliGRAM(s) Oral at bedtime    MEDICATIONS  (PRN):  acetaminophen   Tablet .. 650 milliGRAM(s) Oral once PRN Temp greater or equal to 38C (100.4F), Mild Pain (1 - 3)  melatonin 3 milliGRAM(s) Oral at bedtime PRN Insomnia  nitroglycerin     SubLingual 0.4 milliGRAM(s) SubLingual every 5 minutes PRN Chest Pain  ondansetron Injectable 4 milliGRAM(s) IV Push once PRN Nausea and/or Vomiting  simethicone 80 milliGRAM(s) Chew every 8 hours PRN Dyspepsia      REVIEW OF SYSTEMS:    CONSTITUTIONAL: No weakness, fevers or chills  EYES/ENT: No visual changes;  No vertigo or throat pain   NECK: No pain or stiffness  RESPIRATORY: No cough, wheezing, hemoptysis; No shortness of breath  CARDIOVASCULAR: No chest pain or palpitations  GASTROINTESTINAL: No abdominal or epigastric pain. No nausea, vomiting, or hematemesis; No diarrhea or constipation. No melena or hematochezia.  GENITOURINARY: No dysuria, frequency or hematuria  NEUROLOGICAL: No numbness or weakness  SKIN: No itching, burning, rashes, or lesions   All other review of systems is negative unless indicated above    Vital Signs Last 24 Hrs  T(C): 36.9 (18 May 2020 05:26), Max: 36.9 (17 May 2020 23:30)  T(F): 98.4 (18 May 2020 05:26), Max: 98.5 (17 May 2020 23:30)  HR: 64 (18 May 2020 05:26) (64 - 80)  BP: 134/76 (18 May 2020 05:26) (134/76 - 166/99)  BP(mean): 117 (17 May 2020 17:27) (117 - 117)  RR: 17 (18 May 2020 05:26) (16 - 17)  SpO2: 97% (18 May 2020 05:26) (97% - 100%)    I&O's Summary      PHYSICAL EXAM:    Constitutional: NAD, awake and alert, well-developed  HEENT: PERR, EOMI,  No oral cyananosis.  Neck:  supple,  No JVD  Respiratory: Breath sounds are clear bilaterally, No wheezing, rales or rhonchi  Cardiovascular: S1 and S2, regular rate and rhythm, no Murmurs, gallops or rubs  Gastrointestinal: Bowel Sounds present, soft, nontender.   Extremities: No peripheral edema. No clubbing or cyanosis.  Vascular: 2+ peripheral pulses  Neurological: A/O x 3, no focal deficits  Musculoskeletal: no calf tenderness.  Skin: No rashes.      LABS: All Labs Reviewed:                        14.5   10.24 )-----------( 165      ( 18 May 2020 06:47 )             43.5                         16.2   10.99 )-----------( 196      ( 17 May 2020 17:36 )             47.4     18 May 2020 06:47    141    |  108    |  14     ----------------------------<  121    3.8     |  26     |  0.70   17 May 2020 17:36    137    |  105    |  17     ----------------------------<  108    3.5     |  22     |  0.84     Ca    8.9        18 May 2020 06:47  Ca    9.1        17 May 2020 17:36    TPro  7.0    /  Alb  3.5    /  TBili  0.9    /  DBili  x      /  AST  64     /  ALT  27     /  AlkPhos  69     18 May 2020 06:47  TPro  7.9    /  Alb  3.9    /  TBili  0.8    /  DBili  x      /  AST  25     /  ALT  29     /  AlkPhos  86     17 May 2020 17:36    PT/INR - ( 18 May 2020 06:47 )   PT: 13.1 sec;   INR: 1.17 ratio         PTT - ( 18 May 2020 06:47 )  PTT:42.3 sec  CARDIAC MARKERS ( 18 May 2020 01:23 )  5.180 ng/mL / x     / x     / x     / x      CARDIAC MARKERS ( 17 May 2020 22:48 )  3.010 ng/mL / x     / x     / x     / x      CARDIAC MARKERS ( 17 May 2020 17:36 )  0.083 ng/mL / x     / x     / x     / x        EKG:    Cardiac testing:  No prior cardiac testing available for review. 69 year old male patient with pertinent PMH of mitral valve repair(1998), CVA '01   presented to the ED complaining of substernal chest pressure   that was constant and associated with some mild shortness of breath, nausea, belching and bloating.   Patient denies any aggravating or alleviating factors.   Patient endorses having 2 beers and having a cigar prior to the onset of pressure. Denies   any palpitations, presyncope, headache or dizziness.  In the ED patient was found to have a troponin of 0.083 (17 May 2020 21:34)    Cardiology consulted for chest pain & NSTEMI.  Trop increased to peak of 5 this AM.  Reviewed above symptoms.  Reports pressure type pain yesterday  afternoon lasting 30 min, relieved in ER w/ SL NTG.   Another episode overnight.   No effect w/ activity. No atypical features (worse w/ pressure, pleuritic etc.).  One other similar episode 2 weeks ago lasting 30 min relieved w/ belching.    Reports h/o swelling underneath tongue for months after being on ASA for years, d/janelle 1 month ago w/ resolution of symptoms.    PAST MEDICAL & SURGICAL HISTORY:  Cerebrovascular accident (CVA)  H/O mitral valve repair    Allergies  aspirin (Angioedema (Mild))  Intolerances      SOCIAL HISTORY: smokes cigars, casual ETOH.    FAMILY HISTORY:  FH: CAD (coronary artery disease)    MEDICATIONS:  Medications (outpatient): tamsulosin, tadalafil, fluoxetine, amlodipine.    MEDICATIONS  (STANDING):  amLODIPine   Tablet 5 milliGRAM(s) Oral daily  clopidogrel Tablet 75 milliGRAM(s) Oral daily  FLUoxetine 20 milliGRAM(s) Oral daily  pantoprazole    Tablet 40 milliGRAM(s) Oral before breakfast  tamsulosin 0.4 milliGRAM(s) Oral at bedtime    MEDICATIONS  (PRN):  acetaminophen   Tablet .. 650 milliGRAM(s) Oral once PRN Temp greater or equal to 38C (100.4F), Mild Pain (1 - 3)  melatonin 3 milliGRAM(s) Oral at bedtime PRN Insomnia  nitroglycerin     SubLingual 0.4 milliGRAM(s) SubLingual every 5 minutes PRN Chest Pain  ondansetron Injectable 4 milliGRAM(s) IV Push once PRN Nausea and/or Vomiting  simethicone 80 milliGRAM(s) Chew every 8 hours PRN Dyspepsia      REVIEW OF SYSTEMS:    CONSTITUTIONAL: No weakness, fevers or chills  EYES/ENT: No visual changes;  No vertigo or throat pain   NECK: No pain or stiffness  RESPIRATORY: No cough, wheezing, hemoptysis; No shortness of breath  CARDIOVASCULAR: + chest pain or palpitations  GASTROINTESTINAL: No abdominal or epigastric pain. No nausea, vomiting, or hematemesis; No diarrhea or constipation. No melena or hematochezia.  GENITOURINARY: No dysuria, frequency or hematuria  NEUROLOGICAL: No numbness or weakness  SKIN: No itching, burning, rashes, or lesions   All other review of systems is negative unless indicated above    Vital Signs Last 24 Hrs  T(C): 36.9 (18 May 2020 05:26), Max: 36.9 (17 May 2020 23:30)  T(F): 98.4 (18 May 2020 05:26), Max: 98.5 (17 May 2020 23:30)  HR: 64 (18 May 2020 05:26) (64 - 80)  BP: 134/76 (18 May 2020 05:26) (134/76 - 166/99)  BP(mean): 117 (17 May 2020 17:27) (117 - 117)  RR: 17 (18 May 2020 05:26) (16 - 17)  SpO2: 97% (18 May 2020 05:26) (97% - 100%)    I&O's Summary      PHYSICAL EXAM:    Constitutional: NAD, awake and alert, well-developed  HEENT: PERR, EOMI,  No oral cyananosis.  Neck:  supple,  No JVD  Respiratory: Breath sounds are clear bilaterally, No wheezing, rales or rhonchi  Cardiovascular: S1 and S2, regular rate and rhythm,2/6 systolic murmur 2nd ICS RUSB no rad to carotids, no gallops or rubs  Gastrointestinal: Bowel Sounds present, soft, nontender.   Extremities: No peripheral edema. No clubbing or cyanosis.  Vascular: 2+ peripheral pulses  Neurological: A/O x 3, no focal deficits  Musculoskeletal: no calf tenderness.  Skin: No rashes.      LABS: All Labs Reviewed:                        14.5   10.24 )-----------( 165      ( 18 May 2020 06:47 )             43.5                         16.2   10.99 )-----------( 196      ( 17 May 2020 17:36 )             47.4     18 May 2020 06:47    141    |  108    |  14     ----------------------------<  121    3.8     |  26     |  0.70   17 May 2020 17:36    137    |  105    |  17     ----------------------------<  108    3.5     |  22     |  0.84     Ca    8.9        18 May 2020 06:47  Ca    9.1        17 May 2020 17:36    TPro  7.0    /  Alb  3.5    /  TBili  0.9    /  DBili  x      /  AST  64     /  ALT  27     /  AlkPhos  69     18 May 2020 06:47  TPro  7.9    /  Alb  3.9    /  TBili  0.8    /  DBili  x      /  AST  25     /  ALT  29     /  AlkPhos  86     17 May 2020 17:36    PT/INR - ( 18 May 2020 06:47 )   PT: 13.1 sec;   INR: 1.17 ratio         PTT - ( 18 May 2020 06:47 )  PTT:42.3 sec  CARDIAC MARKERS ( 18 May 2020 01:23 )  5.180 ng/mL / x     / x     / x     / x      CARDIAC MARKERS ( 17 May 2020 22:48 )  3.010 ng/mL / x     / x     / x     / x      CARDIAC MARKERS ( 17 May 2020 17:36 )  0.083 ng/mL / x     / x     / x     / x        EKG:  Multiple ECGs reviewed.  NSR, LVH w/ early repol changes in precordial & inferior leads.    Cardiac testing:  No prior cardiac testing available for review. 69 year old male patient with pertinent PMH of mitral valve repair(1998), CVA '01   presented to the ED complaining of substernal chest pressure   that was constant and associated with some mild shortness of breath, nausea, belching and bloating.   Patient denies any aggravating or alleviating factors.   Patient endorses having 2 beers and having a cigar prior to the onset of pressure. Denies   any palpitations, presyncope, headache or dizziness.  In the ED patient was found to have a troponin of 0.083 (17 May 2020 21:34)    Cardiology consulted for chest pain & NSTEMI.  Trop increased to peak of 5 this AM.  Reviewed above symptoms.  Reports pressure type pain yesterday  afternoon lasting 30 min, relieved in ER w/ SL NTG.   Another episode overnight.   No effect w/ activity. No atypical features (worse w/ pressure, pleuritic etc.).  One other similar episode 2 weeks ago lasting 30 min relieved w/ belching.    Reports h/o swelling underneath tongue for months after being on ASA for years, d/janelle 1 month ago w/ resolution of symptoms.    PAST MEDICAL & SURGICAL HISTORY:  Cerebrovascular accident (CVA)  H/O mitral valve repair    Allergies  aspirin (Angioedema (Mild))  Intolerances      SOCIAL HISTORY: smokes cigars, casual ETOH.    FAMILY HISTORY:  FH: CAD (coronary artery disease)    MEDICATIONS:  Medications (outpatient): tamsulosin, tadalafil, fluoxetine, amlodipine.    MEDICATIONS  (STANDING):  amLODIPine   Tablet 5 milliGRAM(s) Oral daily  clopidogrel Tablet 75 milliGRAM(s) Oral daily  FLUoxetine 20 milliGRAM(s) Oral daily  pantoprazole    Tablet 40 milliGRAM(s) Oral before breakfast  tamsulosin 0.4 milliGRAM(s) Oral at bedtime    MEDICATIONS  (PRN):  acetaminophen   Tablet .. 650 milliGRAM(s) Oral once PRN Temp greater or equal to 38C (100.4F), Mild Pain (1 - 3)  melatonin 3 milliGRAM(s) Oral at bedtime PRN Insomnia  nitroglycerin     SubLingual 0.4 milliGRAM(s) SubLingual every 5 minutes PRN Chest Pain  ondansetron Injectable 4 milliGRAM(s) IV Push once PRN Nausea and/or Vomiting  simethicone 80 milliGRAM(s) Chew every 8 hours PRN Dyspepsia      REVIEW OF SYSTEMS:    CONSTITUTIONAL: No weakness, fevers or chills  EYES/ENT: No visual changes;  No vertigo or throat pain   NECK: No pain or stiffness  RESPIRATORY: No cough, wheezing, hemoptysis; No shortness of breath  CARDIOVASCULAR: + chest pain or palpitations  GASTROINTESTINAL: No abdominal or epigastric pain. No nausea, vomiting, or hematemesis; No diarrhea or constipation. No melena or hematochezia.  GENITOURINARY: No dysuria, frequency or hematuria  NEUROLOGICAL: No numbness or weakness  SKIN: No itching, burning, rashes, or lesions   All other review of systems is negative unless indicated above    Vital Signs Last 24 Hrs  T(C): 36.9 (18 May 2020 05:26), Max: 36.9 (17 May 2020 23:30)  T(F): 98.4 (18 May 2020 05:26), Max: 98.5 (17 May 2020 23:30)  HR: 64 (18 May 2020 05:26) (64 - 80)  BP: 134/76 (18 May 2020 05:26) (134/76 - 166/99)  BP(mean): 117 (17 May 2020 17:27) (117 - 117)  RR: 17 (18 May 2020 05:26) (16 - 17)  SpO2: 97% (18 May 2020 05:26) (97% - 100%)    I&O's Summary      PHYSICAL EXAM:    Constitutional: NAD, awake and alert, well-developed  HEENT: PERR, EOMI,  No oral cyananosis.  Neck:  supple,  No JVD  Respiratory: Breath sounds are clear bilaterally, No wheezing, rales or rhonchi  Cardiovascular: S1 and S2, regular rate and rhythm,2/6 systolic murmur 2nd ICS RUSB no rad to carotids, no gallops or rubs  Gastrointestinal: Bowel Sounds present, soft, nontender.   Extremities: No peripheral edema. No clubbing or cyanosis.  Vascular: 2+ peripheral pulses  Neurological: A/O x 3, no focal deficits  Musculoskeletal: no calf tenderness.  Skin: No rashes.      LABS: All Labs Reviewed:                        14.5   10.24 )-----------( 165      ( 18 May 2020 06:47 )             43.5                         16.2   10.99 )-----------( 196      ( 17 May 2020 17:36 )             47.4     18 May 2020 06:47    141    |  108    |  14     ----------------------------<  121    3.8     |  26     |  0.70   17 May 2020 17:36    137    |  105    |  17     ----------------------------<  108    3.5     |  22     |  0.84     Ca    8.9        18 May 2020 06:47  Ca    9.1        17 May 2020 17:36    TPro  7.0    /  Alb  3.5    /  TBili  0.9    /  DBili  x      /  AST  64     /  ALT  27     /  AlkPhos  69     18 May 2020 06:47  TPro  7.9    /  Alb  3.9    /  TBili  0.8    /  DBili  x      /  AST  25     /  ALT  29     /  AlkPhos  86     17 May 2020 17:36    PT/INR - ( 18 May 2020 06:47 )   PT: 13.1 sec;   INR: 1.17 ratio         PTT - ( 18 May 2020 06:47 )  PTT:42.3 sec  CARDIAC MARKERS ( 18 May 2020 01:23 )  5.180 ng/mL / x     / x     / x     / x      CARDIAC MARKERS ( 17 May 2020 22:48 )  3.010 ng/mL / x     / x     / x     / x      CARDIAC MARKERS ( 17 May 2020 17:36 )  0.083 ng/mL / x     / x     / x     / x        EKG:  Multiple ECGs reviewed.  NSR, ST elevations inferiorly w/ reciprocal changes in I & aVL also w/ ST elevations V3-V6  subsequent ECGs w/ resolution of reciprocal changes I, aVL, no changes in ST elevations.    Cardiac testing:  No prior cardiac testing available for review.

## 2020-05-18 NOTE — PROGRESS NOTE ADULT - SUBJECTIVE AND OBJECTIVE BOX
69 year old male patient with pertinent PMH of mitral valve repair(1998), CVA '01   presented to the ED complaining of substernal chest pressure   that was constant and associated with some mild shortness of breath, nausea, belching and bloating.   Patient denies any aggravating or alleviating factors.   Patient endorses having 2 beers and having a cigar prior to the onset of pressure. Denies   any palpitations, presyncope, headache or dizziness.  In the ED patient was found to have a elevated trop. and then ruled in for NSTEMI.       5/18/20: s/p Cath . 1 Vessel CAD.  RCA Stents.  Prelim. echo and V gram.  NL LV FX.    PAST MEDICAL & SURGICAL HISTORY:  Cerebrovascular accident (CVA)  H/O mitral valve repair    Allergies  aspirin (Angioedema (Mild))  Intolerances      SOCIAL HISTORY: smokes cigars, casual ETOH.    FAMILY HISTORY:  FH: CAD (coronary artery disease)    MEDICATIONS:  Medications (outpatient): tamsulosin, tadalafil, fluoxetine, amlodipine.    MEDICATIONS  (STANDING):  amLODIPine   Tablet 5 milliGRAM(s) Oral daily  clopidogrel Tablet 75 milliGRAM(s) Oral daily  FLUoxetine 20 milliGRAM(s) Oral daily  pantoprazole    Tablet 40 milliGRAM(s) Oral before breakfast  tamsulosin 0.4 milliGRAM(s) Oral at bedtime    MEDICATIONS  (PRN):  acetaminophen   Tablet .. 650 milliGRAM(s) Oral once PRN Temp greater or equal to 38C (100.4F), Mild Pain (1 - 3)  melatonin 3 milliGRAM(s) Oral at bedtime PRN Insomnia  nitroglycerin     SubLingual 0.4 milliGRAM(s) SubLingual every 5 minutes PRN Chest Pain  ondansetron Injectable 4 milliGRAM(s) IV Push once PRN Nausea and/or Vomiting  simethicone 80 milliGRAM(s) Chew every 8 hours PRN Dyspepsia      Vital Signs Last 24 Hrs  T(C): 36.7 (18 May 2020 08:53), Max: 36.9 (17 May 2020 23:30)  T(F): 98 (18 May 2020 08:53), Max: 98.5 (17 May 2020 23:30)  HR: 62 (18 May 2020 12:25) (62 - 80)  BP: 136/75 (18 May 2020 12:25) (131/70 - 177/89)  BP(mean): 117 (17 May 2020 17:27) (117 - 117)  RR: 16 (18 May 2020 12:25) (16 - 18)  SpO2: 97% (18 May 2020 12:25) (97% - 100%)    PHYSICAL EXAM:    Constitutional: NAD, awake and alert, well-developed  HEENT: PERR, EOMI,  No oral cyananosis.  Neck:  supple,  No JVD  Respiratory: Breath sounds are clear bilaterally, No wheezing, rales or rhonchi  Cardiovascular: S1 and S2, regular rate and rhythm,1-2/6 systolic murmur 2nd ICS RUSB no rad to carotids, no gallops or rubs  Gastrointestinal: Bowel Sounds present, soft, nontender.   Extremities: No peripheral edema. No clubbing or cyanosis.  Vascular: 2+ peripheral pulses. Barbeau in RUE WNL.  Neurological: A/O x 3, no focal deficits  Musculoskeletal: no calf tenderness.  Skin: No rashes.      LABS: All Labs Reviewed:                        14.5   10.24 )-----------( 165      ( 18 May 2020 06:47 )             43.5                         16.2   10.99 )-----------( 196      ( 17 May 2020 17:36 )             47.4     18 May 2020 06:47    141    |  108    |  14     ----------------------------<  121    3.8     |  26     |  0.70   17 May 2020 17:36    137    |  105    |  17     ----------------------------<  108    3.5     |  22     |  0.84     Ca    8.9        18 May 2020 06:47  Ca    9.1        17 May 2020 17:36    TPro  7.0    /  Alb  3.5    /  TBili  0.9    /  DBili  x      /  AST  64     /  ALT  27     /  AlkPhos  69     18 May 2020 06:47  TPro  7.9    /  Alb  3.9    /  TBili  0.8    /  DBili  x      /  AST  25     /  ALT  29     /  AlkPhos  86     17 May 2020 17:36    PT/INR - ( 18 May 2020 06:47 )   PT: 13.1 sec;   INR: 1.17 ratio         PTT - ( 18 May 2020 06:47 )  PTT:42.3 sec  CARDIAC MARKERS ( 18 May 2020 01:23 )  5.180 ng/mL / x     / x     / x     / x      CARDIAC MARKERS ( 17 May 2020 22:48 )  3.010 ng/mL / x     / x     / x     / x      CARDIAC MARKERS ( 17 May 2020 17:36 )  0.083 ng/mL / x     / x     / x     / x        EKG:  Multiple ECGs reviewed.  NSR, ST elevations inferiorly w/ reciprocal changes in I & aVL also w/ ST elevations V3-V6  subsequent ECGs w/ resolution of reciprocal changes I, aVL, no changes in ST elevations.      < from: Cardiac Cath Lab - Adult (05.18.20 @ 10:27) >  Angiographic Findings     Cardiac Arteries and Lesion Findings    LMCA: Diffuse irregularity.There is mild diffuse disease noted.    LAD: Diffuse irregularity.There is mild diffuse disease noted.    LCx: Diffuse irregularity.There is mild diffuse disease noted.    RCA: Diffuse irregularity.     Dist RCA: Proximal subsection.100% stenosis 36 mm length reduced to 0%.Pre   procedure LISA 0 flow was noted. Good runoff was present.The lesion was   diagnosed as a high risk lesion.The lesion was diffuse.The lesion showed   evidence of thrombus presence, with irregular contour, mild angulation and   moderate tortuosity.Lesion plaque is ruptured.     Post Procedure LISA III flow was present.     Treatment results:Interventional treatment was successful.     Comments:IVUS was attempted but unable to pass across mid lession.     Devices used     * 6FR AR 1LAUNCHER     * .014 x 190cm [place] XT     * 6F [place] AP     * Euphora SC     Diameter: 2 mm. Length: 20 mm. Total duration: 36 sec.4 inflation(s). to   a max pressure of: 12 JACINTO.     * Emerge     Diameter: 2.5 mm. Length: 30 mm. Total duration: 35 sec.2 inflation(s).   to a max pressure of: 16 JACINTO.     * Euphora SC     Diameter: 3 mm. Length: 20 mm. Total duration: 20 sec.1 inflation(s). to   a max pressure of: 16 JACINTO.     * EAGLE EYE PLATINUM IVUS     * GUIDELINER 6FR     * Synergy JAH Stent     Diameter: 3 mm. Length: 20 mm. 1 inflation(s). to a max pressure of: 20   JACINTO.     * .014 x 182cm CHOICE PT     * Synergy JAH Stent     Diameter: 3 mm. Length: 20 mm. Total duration: 14 sec.1 inflation(s). to   a max pressure of: 22 JACINTO.     Mid RCA: Mid subsection.45% stenosis 12 mm length.Pre procedure LISA III   flow was noted. The lesion was diagnosed as a moderate risk lesion.The   lesion was heavily calcified.     Devices used    Valves  +------+---------+-------------+-------------------------------------------+  !Valve !Stenosis !Insufficiency!Comments                                   !  +------+---------+-------------+-------------------------------------------+  !Aortic!No       !Not assessed !     !  +------+---------+-------------+-------------------------------------------+  !Mitral!Not      !             !Unable to comment on MR as suboptimal      !  !      !assessed !             !ventriculogram. Echo to be performed.      !  +------+---------+-------------+-------------------------------------------+    LV function assessed as:Normal.  Ejection Fraction  +----------------------------------------------------------------------+---+  !Method                     !EF%!  +----------------------------------------------------------------------+---+  !LV gram                                                               !55 !  +----------------------------------------------------------------------+---+    VA  Ventriculography Findings:  Normal left ventricular systolic function.     Coronary tree     Dominance: Right     Impression     Diagnostic Conclusions   1 Vessel CAD with culprit lession in RCA and NL LV FX.     Interventional Conclusions     Successful Coronary Intervention JAH of distal RCA.     Recommendations    Estimated Blood Loss:5ml.    Complications:  No complication.    < end of copied text >

## 2020-05-18 NOTE — PROVIDER CONTACT NOTE (CRITICAL VALUE NOTIFICATION) - ACTION/TREATMENT ORDERED:
EKG + Anticoagulation EKG + Anticoagulation as per MD Giraldo, who consulted with MD Graves. Not upgrading patient at this time

## 2020-05-18 NOTE — PROGRESS NOTE ADULT - PROBLEM SELECTOR PLAN 1
s/p Cath with 1 vessel CAD involving RCA. s/p PCI with JAH.  Start Plavix and aspirin but will be sensitized to aspirin tonight.  Lipids high and will start aspirin.  Likely Dc in AM.

## 2020-05-18 NOTE — PACU DISCHARGE NOTE - COMMENTS
Report given to receiving RN on [facility] by MADELAINE Shirley RN; right radial site remains C,D,I without s/s bleeding; unchanged from prior assessment.  Pt. transferred to inpt. room on [facility] on cardiac monitor accompanied by RN and transport tech.

## 2020-05-18 NOTE — PROGRESS NOTE ADULT - SUBJECTIVE AND OBJECTIVE BOX
CC/reason for follow up: *    S: *    ROS: no chest pain, no dyspnea    T(C): 36.7 (05-18-20 @ 08:53), Max: 36.9 (05-17-20 @ 23:30)  HR: 58 (05-18-20 @ 12:40) (58 - 80)  BP: 150/83 (05-18-20 @ 12:40) (131/70 - 177/89)  RR: 18 (05-18-20 @ 12:40) (16 - 18)  SpO2: 98% (05-18-20 @ 12:40) (97% - 100%)    Gen - Pleasant, cooperative, in no acute distress  HEENT- PERRL, moist mucus membranes, OP clear  CV - RRR, No m/r/g, +pulses, * edema  Lungs - Good effort, Clear to auscultation bilaterally  Abdomen - Soft, nontender, nondistended, +BS, No rebound/rigidity/guarding  Ext - No cyanosis/clubbing.   Skin - No rashes, No jaundice  Psych- Alert & oriented x 3  Neuro- *    MEDICATIONS  (STANDING):  aspirin 325 milliGRAM(s) Oral once  aspirin  chewable 40 milliGRAM(s) Oral once  aspirin  chewable 81 milliGRAM(s) Oral once  aspirin  chewable 162 milliGRAM(s) Oral once  Aspirin 1mg/ml 1 milliGRAM(s) 1 milliGRAM(s) Oral once  Aspirin 1mg/ml 10 milliGRAM(s) 10 milliGRAM(s) Oral once  Aspirin 1mg/ml 20 milliGRAM(s) 20 milliGRAM(s) Oral once  Aspirin 1mg/ml 3 milliGRAM(s) 3 milliGRAM(s) Oral once  clopidogrel Tablet 75 milliGRAM(s) Oral daily  FLUoxetine 20 milliGRAM(s) Oral daily  metoprolol tartrate 12.5 milliGRAM(s) Oral two times a day  pantoprazole    Tablet 40 milliGRAM(s) Oral before breakfast  sodium chloride 0.45%. 1000 milliLiter(s) (75 mL/Hr) IV Continuous <Continuous>  tamsulosin 0.4 milliGRAM(s) Oral at bedtime    MEDICATIONS  (PRN):  acetaminophen   Tablet .. 650 milliGRAM(s) Oral once PRN Temp greater or equal to 38C (100.4F), Mild Pain (1 - 3)  melatonin 3 milliGRAM(s) Oral at bedtime PRN Insomnia  nitroglycerin     SubLingual 0.4 milliGRAM(s) SubLingual every 5 minutes PRN Chest Pain  ondansetron Injectable 4 milliGRAM(s) IV Push once PRN Nausea and/or Vomiting  simethicone 80 milliGRAM(s) Chew every 8 hours PRN Dyspepsia      Diagnostic studies: personally reviewed  LABS: All Labs Reviewed:                        14.5   10.24 )-----------( 165      ( 18 May 2020 06:47 )             43.5     05-18    141  |  108  |  14  ----------------------------<  121<H>  3.8   |  26  |  0.70    Ca    8.9      18 May 2020 06:47    TPro  7.0  /  Alb  3.5  /  TBili  0.9  /  DBili  x   /  AST  64<H>  /  ALT  27  /  AlkPhos  69  05-18    PT/INR - ( 18 May 2020 06:47 )   PT: 13.1 sec;   INR: 1.17 ratio         PTT - ( 18 May 2020 06:47 )  PTT:42.3 sec  CARDIAC MARKERS ( 18 May 2020 01:23 )  5.180 ng/mL / x     / x     / x     / x      CARDIAC MARKERS ( 17 May 2020 22:48 )  3.010 ng/mL / x     / x     / x     / x      CARDIAC MARKERS ( 17 May 2020 17:36 )  0.083 ng/mL / x     / x     / x     / x            Blood Culture:   RADIOLOGY/EKG: CC/reason for follow up: NSTEMI    S: pt seen in cath lab recovery area. had PCI this morning. denies any dizziness or CP or SOB or nausea. getting aspirin, no allergic reactions so far    ROS: no chest pain, no dyspnea    T(C): 36.7 (05-18-20 @ 08:53), Max: 36.9 (05-17-20 @ 23:30)  HR: 58 (05-18-20 @ 12:40) (58 - 80)  BP: 150/83 (05-18-20 @ 12:40) (131/70 - 177/89)  RR: 18 (05-18-20 @ 12:40) (16 - 18)  SpO2: 98% (05-18-20 @ 12:40) (97% - 100%)    Gen - Pleasant, cooperative, in no acute distress  HEENT- PERRL, moist mucus membranes, OP clear  CV - RRR, No r/g, +pulses, no edema. +murmur  Lungs - Good effort, Clear to auscultation bilaterally  Abdomen - Soft, nontender, nondistended, +BS, No rebound/rigidity/guarding  Ext - No cyanosis/clubbing.   Skin - No rashes, No jaundice  Psych- Alert & oriented x 3  Neuro- fluent speech, no facial droop, EOMI. moves all ext    MEDICATIONS  (STANDING):  aspirin 325 milliGRAM(s) Oral once  aspirin  chewable 40 milliGRAM(s) Oral once  aspirin  chewable 81 milliGRAM(s) Oral once  aspirin  chewable 162 milliGRAM(s) Oral once  Aspirin 1mg/ml 1 milliGRAM(s) 1 milliGRAM(s) Oral once  Aspirin 1mg/ml 10 milliGRAM(s) 10 milliGRAM(s) Oral once  Aspirin 1mg/ml 20 milliGRAM(s) 20 milliGRAM(s) Oral once  Aspirin 1mg/ml 3 milliGRAM(s) 3 milliGRAM(s) Oral once  clopidogrel Tablet 75 milliGRAM(s) Oral daily  FLUoxetine 20 milliGRAM(s) Oral daily  metoprolol tartrate 12.5 milliGRAM(s) Oral two times a day  pantoprazole    Tablet 40 milliGRAM(s) Oral before breakfast  sodium chloride 0.45%. 1000 milliLiter(s) (75 mL/Hr) IV Continuous <Continuous>  tamsulosin 0.4 milliGRAM(s) Oral at bedtime    MEDICATIONS  (PRN):  acetaminophen   Tablet .. 650 milliGRAM(s) Oral once PRN Temp greater or equal to 38C (100.4F), Mild Pain (1 - 3)  melatonin 3 milliGRAM(s) Oral at bedtime PRN Insomnia  nitroglycerin     SubLingual 0.4 milliGRAM(s) SubLingual every 5 minutes PRN Chest Pain  ondansetron Injectable 4 milliGRAM(s) IV Push once PRN Nausea and/or Vomiting  simethicone 80 milliGRAM(s) Chew every 8 hours PRN Dyspepsia      Diagnostic studies: personally reviewed  LABS: All Labs Reviewed:                        14.5   10.24 )-----------( 165      ( 18 May 2020 06:47 )             43.5     05-18    141  |  108  |  14  ----------------------------<  121<H>  3.8   |  26  |  0.70    Ca    8.9      18 May 2020 06:47    TPro  7.0  /  Alb  3.5  /  TBili  0.9  /  DBili  x   /  AST  64<H>  /  ALT  27  /  AlkPhos  69  05-18    PT/INR - ( 18 May 2020 06:47 )   PT: 13.1 sec;   INR: 1.17 ratio         PTT - ( 18 May 2020 06:47 )  PTT:42.3 sec  CARDIAC MARKERS ( 18 May 2020 01:23 )  5.180 ng/mL / x     / x     / x     / x      CARDIAC MARKERS ( 17 May 2020 22:48 )  3.010 ng/mL / x     / x     / x     / x      CARDIAC MARKERS ( 17 May 2020 17:36 )  0.083 ng/mL / x     / x     / x     / x            Blood Culture:   RADIOLOGY/EKG:    < from: Xray Chest 1 View AP/PA. (05.17.20 @ 18:10) >  IMPRESSION:    Clear lungs.    < end of copied text >

## 2020-05-18 NOTE — CONSULT NOTE ADULT - PROBLEM SELECTOR RECOMMENDATION 9
Chest pain - 2 episodes overnight.  Peak trop 5.  ECG w/ early repol. changes.  Recommend cardiac cath for further evaluation.  Keep NPO.  Received lovenox 100 mg SC at midnight, no ASA due to above hx, will consider desensitization protocol post cath.  Currently not on statin b/c of myalgias in past will check lipid profile & consider pravastatin. Chest pain - 2 episodes overnight.  Peak trop 5.  ECG w/ transient ST elevations inferiorly w/ reciprocal changes which resolved, also early repol. in precordial leads. Recommend cardiac cath for further evaluation.  Keep NPO.  Received lovenox 100 mg SC at midnight, no ASA due to above hx, will consider desensitization protocol post cath.  Currently not on statin b/c of myalgias in past will check lipid profile & consider pravastatin/fluvastatin.

## 2020-05-18 NOTE — PROVIDER CONTACT NOTE (CHANGE IN STATUS NOTIFICATION) - ACTION/TREATMENT ORDERED:
Call MD with next troponin, pt given nitroglycerin sublingual as per MD Giraldo resulting in relief of symptoms

## 2020-05-18 NOTE — PROGRESS NOTE ADULT - ASSESSMENT
Assessment and Plan:  68 yo man w/ PMH of stroke, MVR presents with CP and SOB.    1.	NSTEMI s/p PCI with JAH x 2 to RCA  2.	aspirin allergy  3.	HTN  4.	hyperlipidemia   5.	leukocytosis  6.	BPH    ·	on aspirin, plavix, BB. statin to be started  ·	s/p lovenox  ·	aspirin desensitization protocol per cardiology  ·	cardiology consult appreciated  Advance directives/GOC/Code status: full  Dispo: inpatient. possible d/c tomorrow   ELOS: 1 more day    All questions/concerns were discussed with patient/family by bedside. Assessment and Plan:  68 yo man w/ PMH of stroke and MVR presents with CP and SOB.    1.	NSTEMI s/p PCI with JAH x 2 to RCA  2.	aspirin allergy  3.	HTN  4.	h/o MV repair  5.	hyperlipidemia   6.	leukocytosis  7.	BPH    ·	on aspirin, plavix, BB.   ·	not on statin due to h/o myalgias   ·	s/p lovenox  ·	aspirin desensitization protocol per cardiology  ·	ECHO pending  ·	monitor on tele  ·	cardiology consult appreciated    Advance directives/GOC/Code status: full  Dispo: inpatient. possible d/c tomorrow   ELOS: 1 more day    All questions/concerns were discussed with patient/family by bedside.

## 2020-05-18 NOTE — PROVIDER CONTACT NOTE (CHANGE IN STATUS NOTIFICATION) - ASSESSMENT
Pt asymptomatic except reports of chest tightness and SOB. vital signs stable. EKG performed, reviewed by MD Giraldo.

## 2020-05-18 NOTE — PACU DISCHARGE NOTE - MENTAL STATUS: PATIENT PARTICIPATION
Awake PRINCIPAL DISCHARGE DIAGNOSIS  Diagnosis: Lung nodules  Assessment and Plan of Treatment: Leave dressing intact until tomorrow evening, reinforcing with tape if necessary (about 36 hours from chest tube removal). At that time you may remove the dressing and take a shower. Place clean gauze over wound if continual drainage. Call Dr. Weston's office at [phone] tomorrow or the next business day to make a followup appointment. Call the office if you experience any fevers, shortness of breath, chest pain or excessive drainage from the incision, day or night. Go to the emergency room if any of these symptoms are severe. Take your medications as ordered and take a stool softener if needed with the narcotic medications. PRINCIPAL DISCHARGE DIAGNOSIS  Diagnosis: Lung nodules  Assessment and Plan of Treatment: Leave dressing intact until tomorrow evening, reinforcing with tape if necessary (about 36 hours from chest tube removal). At that time you may remove the dressing and take a shower. Place clean gauze over wound if continual drainage. No swimming, hot tubs, pool or baths until cleared by MD. Call Dr. Weston's office at [phone] tomorrow or the next business day to make a followup appointment. Call the office if you experience any fevers, shortness of breath, chest pain or excessive drainage from the incision, day or night. Go to the emergency room if any of these symptoms are severe. Take your medications as ordered and take a stool softener if needed with the narcotic medications.

## 2020-05-19 ENCOUNTER — TRANSCRIPTION ENCOUNTER (OUTPATIENT)
Age: 70
End: 2020-05-19

## 2020-05-19 VITALS
OXYGEN SATURATION: 95 % | DIASTOLIC BLOOD PRESSURE: 61 MMHG | HEART RATE: 68 BPM | RESPIRATION RATE: 17 BRPM | SYSTOLIC BLOOD PRESSURE: 121 MMHG | TEMPERATURE: 98 F

## 2020-05-19 DIAGNOSIS — I05.9 RHEUMATIC MITRAL VALVE DISEASE, UNSPECIFIED: ICD-10-CM

## 2020-05-19 DIAGNOSIS — E78.5 HYPERLIPIDEMIA, UNSPECIFIED: ICD-10-CM

## 2020-05-19 LAB
ANION GAP SERPL CALC-SCNC: 9 MMOL/L — SIGNIFICANT CHANGE UP (ref 5–17)
BUN SERPL-MCNC: 17 MG/DL — SIGNIFICANT CHANGE UP (ref 7–23)
CALCIUM SERPL-MCNC: 8.7 MG/DL — SIGNIFICANT CHANGE UP (ref 8.5–10.1)
CHLORIDE SERPL-SCNC: 106 MMOL/L — SIGNIFICANT CHANGE UP (ref 96–108)
CK SERPL-CCNC: 254 U/L — SIGNIFICANT CHANGE UP (ref 26–308)
CO2 SERPL-SCNC: 25 MMOL/L — SIGNIFICANT CHANGE UP (ref 22–31)
CREAT SERPL-MCNC: 0.7 MG/DL — SIGNIFICANT CHANGE UP (ref 0.5–1.3)
GLUCOSE SERPL-MCNC: 104 MG/DL — HIGH (ref 70–99)
HCT VFR BLD CALC: 43.5 % — SIGNIFICANT CHANGE UP (ref 39–50)
HGB BLD-MCNC: 14.7 G/DL — SIGNIFICANT CHANGE UP (ref 13–17)
MAGNESIUM SERPL-MCNC: 2.3 MG/DL — SIGNIFICANT CHANGE UP (ref 1.6–2.6)
MCHC RBC-ENTMCNC: 31.1 PG — SIGNIFICANT CHANGE UP (ref 27–34)
MCHC RBC-ENTMCNC: 33.8 GM/DL — SIGNIFICANT CHANGE UP (ref 32–36)
MCV RBC AUTO: 92 FL — SIGNIFICANT CHANGE UP (ref 80–100)
PLATELET # BLD AUTO: 158 K/UL — SIGNIFICANT CHANGE UP (ref 150–400)
POTASSIUM SERPL-MCNC: 4.1 MMOL/L — SIGNIFICANT CHANGE UP (ref 3.5–5.3)
POTASSIUM SERPL-SCNC: 4.1 MMOL/L — SIGNIFICANT CHANGE UP (ref 3.5–5.3)
RBC # BLD: 4.73 M/UL — SIGNIFICANT CHANGE UP (ref 4.2–5.8)
RBC # FLD: 13.6 % — SIGNIFICANT CHANGE UP (ref 10.3–14.5)
SODIUM SERPL-SCNC: 140 MMOL/L — SIGNIFICANT CHANGE UP (ref 135–145)
TROPONIN I SERPL-MCNC: 8.69 NG/ML — HIGH (ref 0.01–0.04)
WBC # BLD: 8.84 K/UL — SIGNIFICANT CHANGE UP (ref 3.8–10.5)
WBC # FLD AUTO: 8.84 K/UL — SIGNIFICANT CHANGE UP (ref 3.8–10.5)

## 2020-05-19 PROCEDURE — 99233 SBSQ HOSP IP/OBS HIGH 50: CPT

## 2020-05-19 PROCEDURE — 93010 ELECTROCARDIOGRAM REPORT: CPT

## 2020-05-19 PROCEDURE — 99239 HOSP IP/OBS DSCHRG MGMT >30: CPT

## 2020-05-19 RX ORDER — ASPIRIN/CALCIUM CARB/MAGNESIUM 324 MG
1 TABLET ORAL
Qty: 0 | Refills: 0 | DISCHARGE
Start: 2020-05-19

## 2020-05-19 RX ORDER — CLOPIDOGREL BISULFATE 75 MG/1
1 TABLET, FILM COATED ORAL
Qty: 30 | Refills: 0
Start: 2020-05-19 | End: 2020-06-17

## 2020-05-19 RX ORDER — METOPROLOL TARTRATE 50 MG
0.5 TABLET ORAL
Qty: 30 | Refills: 0
Start: 2020-05-19 | End: 2020-06-17

## 2020-05-19 RX ORDER — PANTOPRAZOLE SODIUM 20 MG/1
1 TABLET, DELAYED RELEASE ORAL
Qty: 30 | Refills: 0
Start: 2020-05-19 | End: 2020-06-17

## 2020-05-19 RX ORDER — AMLODIPINE BESYLATE 2.5 MG/1
1 TABLET ORAL
Qty: 0 | Refills: 0 | DISCHARGE

## 2020-05-19 RX ADMIN — Medication 20 MILLIGRAM(S): at 12:09

## 2020-05-19 RX ADMIN — PANTOPRAZOLE SODIUM 40 MILLIGRAM(S): 20 TABLET, DELAYED RELEASE ORAL at 06:41

## 2020-05-19 RX ADMIN — CLOPIDOGREL BISULFATE 75 MILLIGRAM(S): 75 TABLET, FILM COATED ORAL at 12:08

## 2020-05-19 RX ADMIN — Medication 81 MILLIGRAM(S): at 12:08

## 2020-05-19 NOTE — CHART NOTE - NSCHARTNOTEFT_GEN_A_CORE
s/p NSTEMI/ PCI of RCA via Rt radial Artery on 5/18/2020.  Pt denies any acute discomfort.  Rt wrist dressing removed, site : C/D/I, no hematoma, 2+Radial pulses.  EKG: SR 63bpm, no acute ischemic changes.  Labs; pending.  Post- d/c instruction given to pt.  f/u with cardiology in 1week upon d/c.  Recommend cardiac rehab, pt will consider after pandemic controlled.

## 2020-05-19 NOTE — PROGRESS NOTE ADULT - PROBLEM SELECTOR PLAN 1
s/p PCI x 2 to proximal RCA.  Asymptomatic overnight.  H/o mild ASA allergy (tongue swelling) s/p ASA desensitization protocol.  Cont. ASA 81 mg daily, plavix 75 mg daily, metoprolol tartrate 25 mg BID.  Will review h/o myalgias w/ statin & d/c on either atorva or rosuva. s/p PCI x 2 to proximal RCA.  Asymptomatic overnight.  H/o mild ASA allergy (tongue swelling) s/p ASA desensitization protocol.  Cont. ASA 81 mg daily, plavix 75 mg daily, metoprolol tartrate 25 mg BID.  Pt reports h/o myalgias on lipitor & crestor, will try pravastatin & have pt followup in 1 week to review symptoms. s/p PCI x 2 to proximal RCA.  Asymptomatic overnight.  H/o mild ASA allergy (tongue swelling) s/p ASA desensitization protocol.  Cont. ASA 81 mg daily, plavix 75 mg daily, metoprolol tartrate 25 mg BID.  Pt reports h/o myalgias on lipitor & crestor, will try pravastatin 80 mg qHS & have pt followup in 1 week to review symptoms.

## 2020-05-19 NOTE — DISCHARGE NOTE PROVIDER - CARE PROVIDERS DIRECT ADDRESSES
,DirectAddress_Unknown,DirectAddress_Unknown ,DirectAddress_Unknown,DirectAddress_Unknown,[email or web address]

## 2020-05-19 NOTE — PROGRESS NOTE ADULT - SUBJECTIVE AND OBJECTIVE BOX
70 y/o s/p NSTEMI & PCI May 18th, s/p MV repair '98, CVA '01.  admitted for NSTEMI.  Peak trop 5.18  No complaints overnight - no chest pain, dyspnea, palpitations.  No events on tele overnight.    MEDICATIONS:    MEDICATIONS  (STANDING):  aspirin  chewable 81 milliGRAM(s) Oral daily  atorvastatin 40 milliGRAM(s) Oral at bedtime  clopidogrel Tablet 75 milliGRAM(s) Oral daily  FLUoxetine 20 milliGRAM(s) Oral daily  metoprolol tartrate 12.5 milliGRAM(s) Oral two times a day  pantoprazole    Tablet 40 milliGRAM(s) Oral before breakfast  sodium chloride 0.45%. 1000 milliLiter(s) (75 mL/Hr) IV Continuous <Continuous>  tamsulosin 0.4 milliGRAM(s) Oral at bedtime    MEDICATIONS  (PRN):  acetaminophen   Tablet .. 650 milliGRAM(s) Oral once PRN Temp greater or equal to 38C (100.4F), Mild Pain (1 - 3)  melatonin 3 milliGRAM(s) Oral at bedtime PRN Insomnia  nitroglycerin     SubLingual 0.4 milliGRAM(s) SubLingual every 5 minutes PRN Chest Pain  ondansetron Injectable 4 milliGRAM(s) IV Push once PRN Nausea and/or Vomiting  simethicone 80 milliGRAM(s) Chew every 8 hours PRN Dyspepsia      Vital Signs Last 24 Hrs  T(C): 37.1 (19 May 2020 05:28), Max: 37.1 (19 May 2020 05:28)  T(F): 98.7 (19 May 2020 05:28), Max: 98.7 (19 May 2020 05:28)  HR: 59 (19 May 2020 05:28) (57 - 71)  BP: 124/67 (19 May 2020 05:28) (124/67 - 177/89)  BP(mean): 95 (18 May 2020 14:40) (95 - 100)  RR: 18 (19 May 2020 05:28) (16 - 18)  SpO2: 97% (19 May 2020 05:28) (95% - 98%)Daily     Daily I&O's Summary    18 May 2020 07:01  -  19 May 2020 07:00  --------------------------------------------------------  IN: 427.5 mL / OUT: 550 mL / NET: -122.5 mL        PHYSICAL EXAM:    Constitutional: NAD, awake and alert, well-developed  HEENT: PERR, EOMI,  No oral cyananosis.  Neck:  supple,  No JVD  Respiratory: Breath sounds are clear bilaterally, No wheezing, rales or rhonchi  Cardiovascular: S1 and S2, regular rate and rhythm, no Murmurs, gallops or rubs  Gastrointestinal: Bowel Sounds present, soft, nontender.   Extremities: No peripheral edema. No clubbing or cyanosis. R radial access site w/o hematoma.  Vascular: 2+ peripheral pulses  Neurological: A/O x 3, no focal deficits  Musculoskeletal: no calf tenderness.  Skin: No rashes.      LABS: All Labs Reviewed:                        14.5   10.24 )-----------( 165      ( 18 May 2020 06:47 )             43.5                         16.2   10.99 )-----------( 196      ( 17 May 2020 17:36 )             47.4     18 May 2020 06:47    141    |  108    |  14     ----------------------------<  121    3.8     |  26     |  0.70   17 May 2020 17:36    137    |  105    |  17     ----------------------------<  108    3.5     |  22     |  0.84     Ca    8.9        18 May 2020 06:47  Ca    9.1        17 May 2020 17:36    TPro  7.0    /  Alb  3.5    /  TBili  0.9    /  DBili  x      /  AST  64     /  ALT  27     /  AlkPhos  69     18 May 2020 06:47  TPro  7.9    /  Alb  3.9    /  TBili  0.8    /  DBili  x      /  AST  25     /  ALT  29     /  AlkPhos  86     17 May 2020 17:36    PT/INR - ( 18 May 2020 06:47 )   PT: 13.1 sec;   INR: 1.17 ratio         PTT - ( 18 May 2020 06:47 )  PTT:42.3 sec  CARDIAC MARKERS ( 18 May 2020 01:23 )  5.180 ng/mL / x     / x     / x     / x      CARDIAC MARKERS ( 17 May 2020 22:48 )  3.010 ng/mL / x     / x     / x     / x      CARDIAC MARKERS ( 17 May 2020 17:36 )  0.083 ng/mL / x     / x     / x     / x        HbA1c 5.4    Lipid panel:    HDL 39        EKG: NSR, early repol.  precordial leads, borderline ST elevations II, III, aVF    ECHO:  < from: TTE Echo Complete w/o contrast w/ Doppler (05.18.20 @ 08:30) >   Impression     Summary     Technically difficult study with poor visualization of cardiac valves.     Endocardium is not well visualized, however, overall left ventricular   systolic function appears normal.   Technically Difficult Study.   Estimated left ventricular ejection fraction is 55-60 %.   The left atrium is mildly dilated.   Normal appearing right ventricle structure and function.     Fibrocalcific changes noted to the Aortic valve leaflets with preserved   leaflet excursion.   Trace aortic regurgitation is present.   Pt is s/p MV repair. The MV repair appears intact.   Mild (1+) mitral regurgitation is present.   EA reversal of the mitral inflow consistent with reduced compliance of the   left ventricle   Trace tricuspid valve regurgitation is present.     No evidence of pericardial effusion.     Signature     ----------------------------------------------------------------   Electronically signed by Darci Nassar DO(Interpreting   physician) on 05/18/2020 02:53 PM   ----------------------------------------------------------------    < end of copied text >    CARDIAC CATHTERIZATION:    < from: Cardiac Cath Lab - Adult (05.18.20 @ 10:27) >   Angiographic Findings   Cardiac Arteries and Lesion Findings    LMCA: Diffuse irregularity.There is mild diffuse disease noted.  LAD: Diffuse irregularity.There is mild diffuse disease noted.  LCx: Diffuse irregularity.There is mild diffuse disease noted.  RCA: Diffuse irregularity.   Dist RCA: Proximal subsection.100% stenosis 36 mm length reduced to 0%.Pre   procedure LISA 0 flow was noted. Good runoff was present.The lesion was   diagnosed as a high risk lesion.The lesion was diffuse.The lesion showed   evidence of thrombus presence, with irregular contour, mild angulation and   moderate tortuosity.Lesion plaque is ruptured.   Post Procedure LISA III flow was present.     Treatment results:Interventional treatment was successful.   Comments:IVUS was attempted but unable to pass across mid lession.   * Synergy JAH Stent   Diameter: 3 mm. Length: 20 mm. 1 inflation(s). to a max pressure of: 20   JACINTO.   * Synergy JAH Stent   Diameter: 3 mm. Length: 20 mm. Total duration: 14 sec.1 inflation(s). to   a max pressure of: 22 JACINTO     Mid RCA: Mid subsection.45% stenosis 12 mm length.Pre procedure LISA III   flow was noted. The lesion was diagnosed as a moderate risk lesion.The   lesion was heavily calcified.    VA  Ventriculography Findings:  Normal left ventricular systolic function.     Coronary tree   Dominance: Right     Impression   Diagnostic Conclusions   1 Vessel CAD with culprit lession in RCA and NL LV FX.   Interventional Conclusions     Successful Coronary Intervention JAH of distal RCA.   Recommendations    Estimated Blood Loss:5ml.    Complications:  No complication.     Signatures     ----------------------------------------------------------------   Electronically signed by Pepe Frias MD(Performing   Physician) on 05/18/2020 12:27   ----------------------------------------------------------------    < end of copied text >

## 2020-05-19 NOTE — DISCHARGE NOTE PROVIDER - CARE PROVIDER_API CALL
Elie Christiansen)  Cardiology  [address] [place] [address]  Phone: [phone]  Fax: [phone]  Follow Up Time:     Ovidio Miller  INTERNAL MEDICINE  [address] [address]  Phone: [phone]  Fax: [phone]  Follow Up Time: Elie Christiansen)  Cardiology  [address] [place] [address]  Phone: [phone]  Fax: [phone]  Follow Up Time:     Ovidio Miller  INTERNAL MEDICINE  [address] [address]  Phone: [phone]  Fax: [phone]  Follow Up Time:     Yuly Aguilera  ALLERGY AND IMMUNOLOGY  [address] [place] [address]  Phone: [phone]  Fax: [phone]  Follow Up Time:

## 2020-05-19 NOTE — DISCHARGE NOTE PROVIDER - HOSPITAL COURSE
cc: cp / nstemi    hpi: 69 year old male patient with pertinent PMH of mitral valve repair(1998) presented to the ED complaining of substernal chest pressure that was constant and associated with some mild shortness of breath, nausea, belching and bloating. Patient denies any aggravating or alleviating factors. Patient endorses having 2 beers and having a cigar prior to the onset of pressure. Denies any palpitations, presyncope, headache or dizziness.    ~ Patient s/p cardiac cath w/ 2 stents to the RCA.        ros: 10-point ros negative, no complaints         Vital Signs Last 24 Hrs    T(C): 36.7 (19 May 2020 10:34), Max: 37.1 (19 May 2020 05:28)    T(F): 98 (19 May 2020 10:34), Max: 98.7 (19 May 2020 05:28)    HR: 68 (19 May 2020 10:34) (57 - 71)    BP: 121/61 (19 May 2020 10:34) (121/61 - 155/78)    BP(mean): 95 (18 May 2020 14:40) (95 - 97)    RR: 17 (19 May 2020 10:34) (16 - 18)    SpO2: 95% (19 May 2020 10:34) (95% - 98%)        Gen - Pleasant, cooperative, in no acute distress    HEENT- PERRL, moist mucus membranes, OP clear    CV - RRR, No r/g, +pulses, no edema. +murmur    Lungs - Good effort, Clear to auscultation bilaterally    Abdomen - Soft, nontender, nondistended, +BS, No rebound/rigidity/guarding    Ext - No cyanosis/clubbing.     Skin - No rashes, No jaundice    Psych- Alert & oriented x 3    Neuro- fluent speech, no facial droop, EOMI. moves all ext        MEDICATIONS  (STANDING):    aspirin  chewable 81 milliGRAM(s) Oral daily    clopidogrel Tablet 75 milliGRAM(s) Oral daily    FLUoxetine 20 milliGRAM(s) Oral daily    metoprolol tartrate 12.5 milliGRAM(s) Oral two times a day    ondansetron Injectable 4 milliGRAM(s) IV Push once PRN Nausea and/or Vomiting    simethicone 80 milliGRAM(s) Chew every 8 hours PRN Dyspepsia        LABS: All Labs Reviewed:                            14.7     8.84  )-----------( 158      ( 19 May 2020 08:10 )               43.5         05-19        140  |  106  |  17    ----------------------------<  104<H>    4.1   |  25  |  0.70        Ca    8.7      19 May 2020 08:10    Mg     2.3     05-19        TPro  7.0  /  Alb  3.5  /  TBili  0.9  /  DBili  x   /  AST  64<H>  /  ALT  27  /  AlkPhos  69  05-18        PT/INR - ( 18 May 2020 06:47 )   PT: 13.1 sec;   INR: 1.17 ratio               < from: Cardiac Cath Lab - Adult (05.18.20 @ 10:27) >     Impression     Diagnostic Conclusions     1 Vessel CAD with culprit lession in RCA and NL LV FX.     Interventional Conclusions     Successful Coronary Intervention JAH of distal RCA.    < end of copied text >        Assessment and Plan:    68 yo man w/ PMH of stroke and MVR presents with CP and SOB.        NSTEMI s/p PCI with JAH x 2 to RCA    aspirin allergy    HTN    h/o MV repair    hyperlipidemia     leukocytosis    BPH        On aspirin, plavix, BB -- not on acei due to preserved EF    Not on statin due to h/o myalgias -- pravastatin     S/p lovenox    Aspirin desensitization protocol per cardiology    ECHO pending, 55-60 %.    cardiology consult appreciated - f/u outpatient         full code.    All questions/concerns were discussed with patient/family by bedside. cc: cp / nstemi    hpi: 69 year old male patient with pertinent PMH of mitral valve repair(1998) presented to the ED complaining of substernal chest pressure that was constant and associated with some mild shortness of breath, nausea, belching and bloating. Patient denies any aggravating or alleviating factors. Patient endorses having 2 beers and having a cigar prior to the onset of pressure. Denies any palpitations, presyncope, headache or dizziness.    ~ Patient s/p cardiac cath w/ 2 stents to the RCA.        ros: 10-point ros negative, no complaints         Vital Signs Last 24 Hrs    T(C): 36.7 (19 May 2020 10:34), Max: 37.1 (19 May 2020 05:28)    T(F): 98 (19 May 2020 10:34), Max: 98.7 (19 May 2020 05:28)    HR: 68 (19 May 2020 10:34) (57 - 71)    BP: 121/61 (19 May 2020 10:34) (121/61 - 155/78)    BP(mean): 95 (18 May 2020 14:40) (95 - 97)    RR: 17 (19 May 2020 10:34) (16 - 18)    SpO2: 95% (19 May 2020 10:34) (95% - 98%)        Gen - Pleasant, cooperative, in no acute distress    HEENT- PERRL, moist mucus membranes, OP clear    CV - RRR, No r/g, +pulses, no edema. +murmur    Lungs - Good effort, Clear to auscultation bilaterally    Abdomen - Soft, nontender, nondistended, +BS, No rebound/rigidity/guarding    Ext - No cyanosis/clubbing.     Skin - No rashes, No jaundice    Psych- Alert & oriented x 3    Neuro- fluent speech, no facial droop, EOMI. moves all ext        MEDICATIONS  (STANDING):    aspirin  chewable 81 milliGRAM(s) Oral daily    clopidogrel Tablet 75 milliGRAM(s) Oral daily    FLUoxetine 20 milliGRAM(s) Oral daily    metoprolol tartrate 12.5 milliGRAM(s) Oral two times a day    ondansetron Injectable 4 milliGRAM(s) IV Push once PRN Nausea and/or Vomiting    simethicone 80 milliGRAM(s) Chew every 8 hours PRN Dyspepsia        LABS: All Labs Reviewed:                            14.7     8.84  )-----------( 158      ( 19 May 2020 08:10 )               43.5         05-19        140  |  106  |  17    ----------------------------<  104<H>    4.1   |  25  |  0.70        Ca    8.7      19 May 2020 08:10    Mg     2.3     05-19        TPro  7.0  /  Alb  3.5  /  TBili  0.9  /  DBili  x   /  AST  64<H>  /  ALT  27  /  AlkPhos  69  05-18        PT/INR - ( 18 May 2020 06:47 )   PT: 13.1 sec;   INR: 1.17 ratio               < from: Cardiac Cath Lab - Adult (05.18.20 @ 10:27) >     Impression     Diagnostic Conclusions     1 Vessel CAD with culprit lession in RCA and NL LV FX.     Interventional Conclusions     Successful Coronary Intervention JAH of distal RCA.    < end of copied text >        Assessment and Plan:    68 yo man w/ PMH of stroke and MVR presents with CP and SOB.        NSTEMI s/p PCI with JAH x 2 to RCA    aspirin allergy    HTN    h/o MV repair    hyperlipidemia     leukocytosis    BPH        On aspirin, plavix, BB -- not on acei due to preserved EF    will be started on pravastatin on discharge. unable to tolerate other statins due to h/o myalgia    S/p lovenox    Aspirin desensitization protocol per cardiology    ECHO 55-60 %.    d/w pt if resuming cialis or other nitrate meds for ED, not to take NTG due to adverse reaction    cardiology consult appreciated - f/u outpatient         full code.    All questions/concerns were discussed with patient/family by bedside.

## 2020-05-19 NOTE — DISCHARGE NOTE PROVIDER - NSDCMRMEDTOKEN_GEN_ALL_CORE_FT
amLODIPine 5 mg oral tablet: 1 tab(s) orally once a day  FLUoxetine 20 mg oral capsule: 1 cap(s) orally once a day  tadalafil 5 mg oral tablet: 1 tab(s) orally once a day  tamsulosin 0.4 mg oral capsule: 1 cap(s) orally once a day aspirin 81 mg oral tablet, chewable: 1 tab(s) orally once a day  clopidogrel 75 mg oral tablet: 1 tab(s) orally once a day  FLUoxetine 20 mg oral capsule: 1 cap(s) orally once a day  metoprolol tartrate 25 mg oral tablet: 0.5 tab(s) orally 2 times a day   *Hold if HR &lt; 60*  pantoprazole 40 mg oral delayed release tablet: 1 tab(s) orally once a day (before a meal)  pravastatin 80 mg oral tablet: 1 tab(s) orally once a day   tadalafil 5 mg oral tablet: 1 tab(s) orally once a day  tamsulosin 0.4 mg oral capsule: 1 cap(s) orally once a day

## 2020-05-19 NOTE — DISCHARGE NOTE NURSING/CASE MANAGEMENT/SOCIAL WORK - NSDCPEPTSTRK_GEN_ALL_CORE
Risk factors for stroke/Need for follow up after discharge/Stroke education booklet/Call 911 for stroke/Prescribed medications/Stroke support groups for patients, families, and friends/Stroke warning signs and symptoms/Signs and symptoms of stroke

## 2020-05-19 NOTE — DISCHARGE NOTE PROVIDER - NSDCQMACEAOTHER_CARD_A_CORE_FT
EF normal d/w cardiology EF preserved d/w cardiology EF preserved EF preserved d/w cardiology d/w cardio

## 2020-05-19 NOTE — DISCHARGE NOTE PROVIDER - PROVIDER TOKENS
PROVIDER:[TOKEN:[[id number]]],PROVIDER:[TOKEN:[[id number]]] PROVIDER:[TOKEN:[[id number]]],PROVIDER:[TOKEN:[[id number]]],PROVIDER:[TOKEN:[[id number]]]

## 2020-05-19 NOTE — PROVIDER CONTACT NOTE (CRITICAL VALUE NOTIFICATION) - SITUATION
Pt admitted for CP + SOB. First troponin 0.083. Second troponin 3.01.
Pt has upward trending troponins after being admitted for chest pain.
Pt s/p cath with stent

## 2020-05-19 NOTE — PROGRESS NOTE ADULT - ASSESSMENT
OK to d/c w/ followup in cardiology clinic in 1 week.  Cardiology clinic number provided to patient.

## 2020-05-19 NOTE — DISCHARGE NOTE NURSING/CASE MANAGEMENT/SOCIAL WORK - PATIENT PORTAL LINK FT
You can access the FollowMyHealth Patient Portal offered by [facility] by registering at the following website: [email or web address]. By joining [employer]’s FollowMyHealth portal, you will also be able to view your health information using other applications (apps) compatible with our system.

## 2020-05-19 NOTE — DISCHARGE NOTE PROVIDER - NSDCCPCAREPLAN_GEN_ALL_CORE_FT
PRINCIPAL DISCHARGE DIAGNOSIS  Diagnosis: Chest pain  Assessment and Plan of Treatment: - You were admitted due to chest pain due to coronary artery disease  - You underwent a cardiac cath and recieved 2 stents  - Continue to take aspirin, plavix, pravastatin, and metoprolol  - Follow up with Dr. Ortiz in 1 week  - *Do not take nitrates with tadefinil* PRINCIPAL DISCHARGE DIAGNOSIS  Diagnosis: Chest pain  Assessment and Plan of Treatment: - You were admitted due to chest pain due to coronary artery disease  - You underwent a cardiac cath and recieved 2 stents  - Continue to take aspirin, plavix, pravastatin, and metoprolol  - Follow up with Dr. Ortiz in 1 week  - *Do not take nitrates with tadefinil*      SECONDARY DISCHARGE DIAGNOSES  Diagnosis: Allergies  Assessment and Plan of Treatment: - Follow up with Dr. Aguilera for allergy testing

## 2020-05-20 ENCOUNTER — APPOINTMENT (OUTPATIENT)
Dept: CARDIOLOGY | Facility: CLINIC | Age: 70
End: 2020-05-20
Payer: MEDICARE

## 2020-05-20 PROCEDURE — 99212 OFFICE O/P EST SF 10 MIN: CPT

## 2020-05-21 RX ORDER — RIVAROXABAN 15 MG/1
15 TABLET, FILM COATED ORAL
Qty: 90 | Refills: 3 | Status: DISCONTINUED | COMMUNITY
Start: 2020-05-20 | End: 2020-05-21

## 2020-05-21 RX ORDER — METOPROLOL SUCCINATE 25 MG/1
25 TABLET, EXTENDED RELEASE ORAL
Refills: 0 | Status: DISCONTINUED | COMMUNITY
End: 2020-05-21

## 2020-05-26 ENCOUNTER — APPOINTMENT (OUTPATIENT)
Dept: CARDIOLOGY | Facility: CLINIC | Age: 70
End: 2020-05-26
Payer: MEDICARE

## 2020-05-26 ENCOUNTER — NON-APPOINTMENT (OUTPATIENT)
Age: 70
End: 2020-05-26

## 2020-05-26 VITALS
HEIGHT: 73 IN | RESPIRATION RATE: 16 BRPM | HEART RATE: 61 BPM | SYSTOLIC BLOOD PRESSURE: 171 MMHG | BODY MASS INDEX: 31.14 KG/M2 | WEIGHT: 235 LBS | DIASTOLIC BLOOD PRESSURE: 94 MMHG | OXYGEN SATURATION: 97 % | TEMPERATURE: 98.1 F

## 2020-05-26 DIAGNOSIS — M18.12 UNILATERAL PRIMARY OSTEOARTHRITIS OF FIRST CARPOMETACARPAL JOINT, LEFT HAND: ICD-10-CM

## 2020-05-26 DIAGNOSIS — M18.11 UNILATERAL PRIMARY OSTEOARTHRITIS OF FIRST CARPOMETACARPAL JOINT, RIGHT HAND: ICD-10-CM

## 2020-05-26 DIAGNOSIS — M79.643 PAIN IN UNSPECIFIED HAND: ICD-10-CM

## 2020-05-26 PROCEDURE — 99215 OFFICE O/P EST HI 40 MIN: CPT

## 2020-05-26 PROCEDURE — 93000 ELECTROCARDIOGRAM COMPLETE: CPT

## 2020-05-27 NOTE — HISTORY OF PRESENT ILLNESS
[Chest Pain Starting When At Rest] : denies chest pain at rest [Difficulty Breathing (Dyspnea)] : denies dyspnea [Chest Pain Which Starts With Exertion] : denies exertional chest pain [Feeling Tired (Fatigue)] : denies fatigue [Feelings Of Weakness On Exertion] : denies reduced exercise tolerance [Regional Soft Tissue Swelling Both Lower Extremities] : denies edema [Palpitations] : denies palpitations [Difficulty Breathing Only When Lying Down (Supine Dyspnea)] : denies orthopnea [Leg Pain With Exercise (Leg Claudication)] : denies claudication [Dizziness] : denies dizziness [Dizziness Upon Standing Up] : denies orthostatic dizziness [Stable] : weight has been stable [de-identified] : issue with aspirn allergy has resolved with medication and withdrawl of aspirn.  Had this reaction despite sensitization in hospital.  Did not start Xarelto as this was to expensive for him.

## 2020-05-27 NOTE — PHYSICAL EXAM
[General Appearance - Well Developed] : well developed [General Appearance - Well Nourished] : well nourished [Normal Conjunctiva] : the conjunctiva exhibited no abnormalities [Eyelids - No Xanthelasma] : the eyelids demonstrated no xanthelasmas [Normal Oral Mucosa] : normal oral mucosa [No Oral Pallor] : no oral pallor [Normal Jugular Venous A Waves Present] : normal jugular venous A waves present [Normal Jugular Venous V Waves Present] : normal jugular venous V waves present [Auscultation Breath Sounds / Voice Sounds] : lungs were clear to auscultation bilaterally [5th Left ICS - MCL] : palpated at the 5th LICS in the midclavicular line [Rhythm Regular] : regular [Normal S1] : normal S1 [Normal S2] : normal S2 [No Murmur] : no murmurs heard [2+] : left 2+ [Abdomen Soft] : soft [Bowel Sounds] : normal bowel sounds [Abnormal Walk] : normal gait [Nail Clubbing] : no clubbing of the fingernails [] : no rash [Cyanosis, Localized] : no localized cyanosis [Oriented To Time, Place, And Person] : oriented to person, place, and time [No Anxiety] : not feeling anxious [Right Carotid Bruit] : no bruit heard over the right carotid [Left Carotid Bruit] : no bruit heard over the left carotid

## 2020-05-27 NOTE — DISCUSSION/SUMMARY
[FreeTextEntry1] : NSTEMI/JAH placement/Aspirin allergy:  Discussed with him using Xarelto and gave 6 weeks of samples to allow for us to get PA for use. Will use Xarlto and plavix given aspirin allergy.  \par HL: Blood work to monitor response to Pravastatin. low fat diet and exercise and weight lossed discussed.\par Screenign exams discussed and will schedule after fu in 4 weeks.

## 2020-05-27 NOTE — REASON FOR VISIT
[Coronary Artery Disease] : coronary artery disease [Follow-Up - From Hospitalization] : follow-up of a recent hospitalization for [Hyperlipidemia] : hyperlipidemia [Hypertension] : hypertension [Prior Myocardial Infarction] : a prior myocardial infarction

## 2020-06-02 ENCOUNTER — APPOINTMENT (OUTPATIENT)
Dept: UROLOGY | Facility: CLINIC | Age: 70
End: 2020-06-02
Payer: MEDICARE

## 2020-06-02 VITALS
HEIGHT: 73 IN | HEART RATE: 68 BPM | BODY MASS INDEX: 30.48 KG/M2 | SYSTOLIC BLOOD PRESSURE: 154 MMHG | TEMPERATURE: 99.6 F | OXYGEN SATURATION: 95 % | WEIGHT: 230 LBS | DIASTOLIC BLOOD PRESSURE: 90 MMHG

## 2020-06-02 PROCEDURE — 99204 OFFICE O/P NEW MOD 45 MIN: CPT | Mod: 25

## 2020-06-02 PROCEDURE — 81003 URINALYSIS AUTO W/O SCOPE: CPT | Mod: QW

## 2020-06-04 LAB
APPEARANCE: ABNORMAL
BACTERIA: ABNORMAL
BILIRUB UR QL STRIP: NORMAL
BILIRUBIN URINE: ABNORMAL
BLOOD URINE: ABNORMAL
COLOR: ABNORMAL
GLUCOSE QUALITATIVE U: NEGATIVE
GLUCOSE UR-MCNC: NORMAL
HCG UR QL: 0.2 EU/DL
HGB UR QL STRIP.AUTO: NORMAL
HYALINE CASTS: 0 /LPF
KETONES UR-MCNC: NORMAL
KETONES URINE: NEGATIVE
LEUKOCYTE ESTERASE UR QL STRIP: NORMAL
LEUKOCYTE ESTERASE URINE: NEGATIVE
MICROSCOPIC-UA: NORMAL
NITRITE UR QL STRIP: NORMAL
NITRITE URINE: NEGATIVE
PH UR STRIP: 6.5
PH URINE: 6.5
PROT UR STRIP-MCNC: NORMAL
PROTEIN URINE: ABNORMAL
RED BLOOD CELLS URINE: >720 /HPF
SP GR UR STRIP: 1.02
SPECIFIC GRAVITY URINE: 1.02
SQUAMOUS EPITHELIAL CELLS: 0 /HPF
URINE CYTOLOGY: NORMAL
UROBILINOGEN URINE: NORMAL
WHITE BLOOD CELLS URINE: 6 /HPF

## 2020-06-04 NOTE — HISTORY OF PRESENT ILLNESS
[FreeTextEntry1] : This patient is here for the evaluation of hematuria noted he does not have a history of lower urinary tract problems in the past.  He had noted a clot from his penis as well.

## 2020-06-04 NOTE — REVIEW OF SYSTEMS
[Eyesight Problems] : eyesight problems [Poor quality erections] : Poor quality erections [Blood in urine that you can see] : blood visible in urine [Wake up at night to urinate  How many times?  ___] : wakes up to urinate [unfilled] times during the night [Strong urge to urinate] : strong urge to urinate [Anxiety] : anxiety [Depression] : depression [see HPI] : see HPI [Negative] : Psychiatric

## 2020-06-04 NOTE — END OF VISIT
[FreeTextEntry3] : The etiology of hematuria was gone over with the patient in detail and he will have a CAT scan and cystourethroscopy with plans to follow prostatitis may be a factor and he will be treated with cefuroxime.

## 2020-06-04 NOTE — PHYSICAL EXAM
[General Appearance - Well Developed] : well developed [Normal Appearance] : normal appearance [General Appearance - Well Nourished] : well nourished [General Appearance - In No Acute Distress] : no acute distress [Well Groomed] : well groomed [Edema] : no peripheral edema [Respiration, Rhythm And Depth] : normal respiratory rhythm and effort [Exaggerated Use Of Accessory Muscles For Inspiration] : no accessory muscle use [Abdomen Soft] : soft [Abdomen Tenderness] : non-tender [Costovertebral Angle Tenderness] : no ~M costovertebral angle tenderness [Scrotum] : the scrotum was normal [Urinary Bladder Findings] : the bladder was normal on palpation [Urethral Meatus] : meatus normal [Testes Mass (___cm)] : there were no testicular masses [No Prostate Nodules] : no prostate nodules [Normal Station and Gait] : the gait and station were normal for the patient's age [] : no rash [No Focal Deficits] : no focal deficits [Affect] : the affect was normal [Oriented To Time, Place, And Person] : oriented to person, place, and time [Not Anxious] : not anxious [Mood] : the mood was normal [No Palpable Adenopathy] : no palpable adenopathy [FreeTextEntry1] : Palpably benign moderate sized prostate gland

## 2020-06-10 PROBLEM — N40.0 BENIGN PROSTATIC HYPERPLASIA WITHOUT LOWER URINARY TRACT SYMPTOMS: Chronic | Status: ACTIVE | Noted: 2020-06-07

## 2020-06-10 PROBLEM — I10 ESSENTIAL (PRIMARY) HYPERTENSION: Chronic | Status: ACTIVE | Noted: 2020-06-07

## 2020-06-11 ENCOUNTER — OUTPATIENT (OUTPATIENT)
Dept: OUTPATIENT SERVICES | Facility: HOSPITAL | Age: 70
LOS: 1 days | End: 2020-06-11
Payer: MEDICARE

## 2020-06-11 ENCOUNTER — APPOINTMENT (OUTPATIENT)
Dept: CT IMAGING | Facility: CLINIC | Age: 70
End: 2020-06-11
Payer: MEDICARE

## 2020-06-11 DIAGNOSIS — Z00.8 ENCOUNTER FOR OTHER GENERAL EXAMINATION: ICD-10-CM

## 2020-06-11 DIAGNOSIS — Z98.890 OTHER SPECIFIED POSTPROCEDURAL STATES: Chronic | ICD-10-CM

## 2020-06-11 PROCEDURE — 74178 CT ABD&PLV WO CNTR FLWD CNTR: CPT | Mod: 26

## 2020-06-11 PROCEDURE — 74178 CT ABD&PLV WO CNTR FLWD CNTR: CPT

## 2020-06-15 ENCOUNTER — TRANSCRIPTION ENCOUNTER (OUTPATIENT)
Age: 70
End: 2020-06-15

## 2020-06-18 ENCOUNTER — OUTPATIENT (OUTPATIENT)
Dept: OUTPATIENT SERVICES | Facility: HOSPITAL | Age: 70
LOS: 1 days | End: 2020-06-18
Payer: MEDICARE

## 2020-06-18 ENCOUNTER — APPOINTMENT (OUTPATIENT)
Dept: RADIOLOGY | Facility: CLINIC | Age: 70
End: 2020-06-18
Payer: MEDICARE

## 2020-06-18 ENCOUNTER — APPOINTMENT (OUTPATIENT)
Dept: UROLOGY | Facility: CLINIC | Age: 70
End: 2020-06-18
Payer: MEDICARE

## 2020-06-18 VITALS
TEMPERATURE: 97.7 F | DIASTOLIC BLOOD PRESSURE: 92 MMHG | OXYGEN SATURATION: 95 % | HEART RATE: 56 BPM | SYSTOLIC BLOOD PRESSURE: 184 MMHG | WEIGHT: 230 LBS | BODY MASS INDEX: 30.48 KG/M2 | HEIGHT: 73 IN

## 2020-06-18 DIAGNOSIS — Z89.442 ACQUIRED ABSENCE OF LEFT ANKLE: ICD-10-CM

## 2020-06-18 DIAGNOSIS — Z98.890 OTHER SPECIFIED POSTPROCEDURAL STATES: Chronic | ICD-10-CM

## 2020-06-18 DIAGNOSIS — Z87.442 PERSONAL HISTORY OF URINARY CALCULI: ICD-10-CM

## 2020-06-18 PROCEDURE — 52214Z: CUSTOM

## 2020-06-18 PROCEDURE — 99213 OFFICE O/P EST LOW 20 MIN: CPT | Mod: 25

## 2020-06-18 PROCEDURE — 74018 RADEX ABDOMEN 1 VIEW: CPT | Mod: 26

## 2020-06-18 PROCEDURE — 74018 RADEX ABDOMEN 1 VIEW: CPT

## 2020-06-18 NOTE — REVIEW OF SYSTEMS
[Eyesight Problems] : eyesight problems [see HPI] : see HPI [Poor quality erections] : Poor quality erections [Blood in urine that you can see] : blood visible in urine [Wake up at night to urinate  How many times?  ___] : wakes up to urinate [unfilled] times during the night [Strong urge to urinate] : strong urge to urinate [Anxiety] : anxiety [Depression] : depression [Negative] : Heme/Lymph

## 2020-06-18 NOTE — ASSESSMENT
[FreeTextEntry1] : After endoscopy the source of his hematuria seems to be a enlarged and inflamed prostate gland.  His stones on a KUB x-ray done today are opaque and 1 of them is in the renal pelvis.  I will discuss his treatment with 1 of my associates and he will no doubt have a ureteroscopy with ablation of the stone.  I also reviewed stone therapy with him and he will start stone prevention.  We will also take tamsulosin and finasteride regarding his prostate issues

## 2020-06-18 NOTE — HISTORY OF PRESENT ILLNESS
[FreeTextEntry1] : This patient is here today for endoscopy for hematuria.  And a determination as to what should be done regarding the renal stones discovered on a CAT scan for evaluation hematuria.

## 2020-06-18 NOTE — PHYSICAL EXAM
[General Appearance - Well Developed] : well developed [Normal Appearance] : normal appearance [General Appearance - Well Nourished] : well nourished [Well Groomed] : well groomed [General Appearance - In No Acute Distress] : no acute distress [Abdomen Soft] : soft [Abdomen Tenderness] : non-tender [Costovertebral Angle Tenderness] : no ~M costovertebral angle tenderness [Urethral Meatus] : meatus normal [Urinary Bladder Findings] : the bladder was normal on palpation [Testes Mass (___cm)] : there were no testicular masses [Scrotum] : the scrotum was normal [No Prostate Nodules] : no prostate nodules [FreeTextEntry1] : Palpably benign moderate sized prostate gland [Edema] : no peripheral edema [Exaggerated Use Of Accessory Muscles For Inspiration] : no accessory muscle use [] : no respiratory distress [Respiration, Rhythm And Depth] : normal respiratory rhythm and effort [Oriented To Time, Place, And Person] : oriented to person, place, and time [Affect] : the affect was normal [Normal Station and Gait] : the gait and station were normal for the patient's age [Not Anxious] : not anxious [Mood] : the mood was normal [No Palpable Adenopathy] : no palpable adenopathy [No Focal Deficits] : no focal deficits

## 2020-07-09 ENCOUNTER — OUTPATIENT (OUTPATIENT)
Dept: OUTPATIENT SERVICES | Facility: HOSPITAL | Age: 70
LOS: 1 days | End: 2020-07-09
Payer: MEDICARE

## 2020-07-09 VITALS
TEMPERATURE: 98 F | HEART RATE: 61 BPM | HEIGHT: 72 IN | OXYGEN SATURATION: 96 % | RESPIRATION RATE: 18 BRPM | WEIGHT: 229.5 LBS | DIASTOLIC BLOOD PRESSURE: 86 MMHG | SYSTOLIC BLOOD PRESSURE: 147 MMHG

## 2020-07-09 DIAGNOSIS — Z01.818 ENCOUNTER FOR OTHER PREPROCEDURAL EXAMINATION: ICD-10-CM

## 2020-07-09 DIAGNOSIS — Z87.442 PERSONAL HISTORY OF URINARY CALCULI: ICD-10-CM

## 2020-07-09 DIAGNOSIS — Z96.643 PRESENCE OF ARTIFICIAL HIP JOINT, BILATERAL: Chronic | ICD-10-CM

## 2020-07-09 DIAGNOSIS — Z98.890 OTHER SPECIFIED POSTPROCEDURAL STATES: Chronic | ICD-10-CM

## 2020-07-09 DIAGNOSIS — Z41.9 ENCOUNTER FOR PROCEDURE FOR PURPOSES OTHER THAN REMEDYING HEALTH STATE, UNSPECIFIED: Chronic | ICD-10-CM

## 2020-07-09 LAB
ANION GAP SERPL CALC-SCNC: 5 MMOL/L — SIGNIFICANT CHANGE UP (ref 5–17)
APPEARANCE UR: ABNORMAL
APTT BLD: 48.2 SEC — HIGH (ref 27.5–35.5)
BASOPHILS # BLD AUTO: 0.03 K/UL — SIGNIFICANT CHANGE UP (ref 0–0.2)
BASOPHILS NFR BLD AUTO: 0.4 % — SIGNIFICANT CHANGE UP (ref 0–2)
BILIRUB UR-MCNC: NEGATIVE — SIGNIFICANT CHANGE UP
BUN SERPL-MCNC: 16 MG/DL — SIGNIFICANT CHANGE UP (ref 7–23)
CALCIUM SERPL-MCNC: 9.3 MG/DL — SIGNIFICANT CHANGE UP (ref 8.5–10.1)
CHLORIDE SERPL-SCNC: 108 MMOL/L — SIGNIFICANT CHANGE UP (ref 96–108)
CO2 SERPL-SCNC: 28 MMOL/L — SIGNIFICANT CHANGE UP (ref 22–31)
COLOR SPEC: YELLOW — SIGNIFICANT CHANGE UP
CREAT SERPL-MCNC: 0.87 MG/DL — SIGNIFICANT CHANGE UP (ref 0.5–1.3)
DIFF PNL FLD: ABNORMAL
EOSINOPHIL # BLD AUTO: 0.25 K/UL — SIGNIFICANT CHANGE UP (ref 0–0.5)
EOSINOPHIL NFR BLD AUTO: 3 % — SIGNIFICANT CHANGE UP (ref 0–6)
GLUCOSE SERPL-MCNC: 97 MG/DL — SIGNIFICANT CHANGE UP (ref 70–99)
GLUCOSE UR QL: NEGATIVE MG/DL — SIGNIFICANT CHANGE UP
HCT VFR BLD CALC: 44.8 % — SIGNIFICANT CHANGE UP (ref 39–50)
HGB BLD-MCNC: 15.1 G/DL — SIGNIFICANT CHANGE UP (ref 13–17)
IMM GRANULOCYTES NFR BLD AUTO: 0.2 % — SIGNIFICANT CHANGE UP (ref 0–1.5)
INR BLD: 1.69 RATIO — HIGH (ref 0.88–1.16)
KETONES UR-MCNC: NEGATIVE — SIGNIFICANT CHANGE UP
LEUKOCYTE ESTERASE UR-ACNC: ABNORMAL
LYMPHOCYTES # BLD AUTO: 1.92 K/UL — SIGNIFICANT CHANGE UP (ref 1–3.3)
LYMPHOCYTES # BLD AUTO: 23.3 % — SIGNIFICANT CHANGE UP (ref 13–44)
MCHC RBC-ENTMCNC: 30.5 PG — SIGNIFICANT CHANGE UP (ref 27–34)
MCHC RBC-ENTMCNC: 33.7 GM/DL — SIGNIFICANT CHANGE UP (ref 32–36)
MCV RBC AUTO: 90.5 FL — SIGNIFICANT CHANGE UP (ref 80–100)
MONOCYTES # BLD AUTO: 0.85 K/UL — SIGNIFICANT CHANGE UP (ref 0–0.9)
MONOCYTES NFR BLD AUTO: 10.3 % — SIGNIFICANT CHANGE UP (ref 2–14)
NEUTROPHILS # BLD AUTO: 5.16 K/UL — SIGNIFICANT CHANGE UP (ref 1.8–7.4)
NEUTROPHILS NFR BLD AUTO: 62.8 % — SIGNIFICANT CHANGE UP (ref 43–77)
NITRITE UR-MCNC: NEGATIVE — SIGNIFICANT CHANGE UP
PH UR: 7 — SIGNIFICANT CHANGE UP (ref 5–8)
PLATELET # BLD AUTO: 193 K/UL — SIGNIFICANT CHANGE UP (ref 150–400)
POTASSIUM SERPL-MCNC: 3.9 MMOL/L — SIGNIFICANT CHANGE UP (ref 3.5–5.3)
POTASSIUM SERPL-SCNC: 3.9 MMOL/L — SIGNIFICANT CHANGE UP (ref 3.5–5.3)
PROT UR-MCNC: 15 MG/DL
PROTHROM AB SERPL-ACNC: 19.3 SEC — HIGH (ref 10.6–13.6)
RBC # BLD: 4.95 M/UL — SIGNIFICANT CHANGE UP (ref 4.2–5.8)
RBC # FLD: 12.9 % — SIGNIFICANT CHANGE UP (ref 10.3–14.5)
SODIUM SERPL-SCNC: 141 MMOL/L — SIGNIFICANT CHANGE UP (ref 135–145)
SP GR SPEC: 1 — LOW (ref 1.01–1.02)
UROBILINOGEN FLD QL: NEGATIVE MG/DL — SIGNIFICANT CHANGE UP
WBC # BLD: 8.23 K/UL — SIGNIFICANT CHANGE UP (ref 3.8–10.5)
WBC # FLD AUTO: 8.23 K/UL — SIGNIFICANT CHANGE UP (ref 3.8–10.5)

## 2020-07-09 PROCEDURE — 85610 PROTHROMBIN TIME: CPT

## 2020-07-09 PROCEDURE — G0463: CPT | Mod: 25

## 2020-07-09 PROCEDURE — 85025 COMPLETE CBC W/AUTO DIFF WBC: CPT

## 2020-07-09 PROCEDURE — 86900 BLOOD TYPING SEROLOGIC ABO: CPT

## 2020-07-09 PROCEDURE — 86850 RBC ANTIBODY SCREEN: CPT

## 2020-07-09 PROCEDURE — 81001 URINALYSIS AUTO W/SCOPE: CPT

## 2020-07-09 PROCEDURE — 93010 ELECTROCARDIOGRAM REPORT: CPT

## 2020-07-09 PROCEDURE — 36415 COLL VENOUS BLD VENIPUNCTURE: CPT

## 2020-07-09 PROCEDURE — 85730 THROMBOPLASTIN TIME PARTIAL: CPT

## 2020-07-09 PROCEDURE — 86901 BLOOD TYPING SEROLOGIC RH(D): CPT

## 2020-07-09 PROCEDURE — 80048 BASIC METABOLIC PNL TOTAL CA: CPT

## 2020-07-09 PROCEDURE — 87086 URINE CULTURE/COLONY COUNT: CPT

## 2020-07-09 PROCEDURE — 93005 ELECTROCARDIOGRAM TRACING: CPT

## 2020-07-09 NOTE — H&P PST ADULT - NSICDXPASTMEDICALHX_GEN_ALL_CORE_FT
PAST MEDICAL HISTORY:  Bladder stones     BPH (benign prostatic hyperplasia)     CAD, multiple vessel with 2 stents 5/2020    Hematuria     HLD (hyperlipidemia)     HTN (hypertension)     Stroke 2001 PAST MEDICAL HISTORY:  Bladder stones     BPH (benign prostatic hyperplasia)     CAD, multiple vessel with 2 stents 5/2020    Hematuria     HLD (hyperlipidemia)     HTN (hypertension)     Sleep apnea uses cpap    Stroke 2001

## 2020-07-09 NOTE — H&P PST ADULT - TOBACCO USE
I submitted a PA on Mexiletine 150 mg TID to [employer]. It was approved. Patient notified and aware.  
Never smoker

## 2020-07-09 NOTE — H&P PST ADULT - HISTORY OF PRESENT ILLNESS
This is a 69 y/o male with a PMH of HTN, CAD with recent 2 cardiac stents placed in May 2020, BPH who reports he had hematuria in May 2020 and work up revealed bladders stones. He reports that hematuria has since resolved. He denies any fevers, flank pain, SOB, chest pain or palpations. He is scheduled for a cystoscopy and Left ureteroscopy and laser stone removal. This is a 69 y/o male with a PMH of HTN, CAD with recent 2 cardiac stents placed in May 2020, sleep apnea (uses cpap), and BPH who reports he had hematuria in May 2020 and work up revealed bladders stones. He reports that hematuria has since resolved. He denies any dysuria, fevers, flank pain, SOB, chest pain or palpations. He is scheduled for a cystoscopy and Left ureteroscopy and laser stone removal.

## 2020-07-09 NOTE — H&P PST ADULT - NSICDXPASTSURGICALHX_GEN_ALL_CORE_FT
PAST SURGICAL HISTORY:  Elective surgery cardiac stents 5/2020    H/O mitral valve repair 1998    S/P bilateral hip replacements 2005 & 2010

## 2020-07-09 NOTE — H&P PST ADULT - ASSESSMENT
This is a 71 y/o male with bladder stones who is scheduled for a cystoscopy and Left ureteroscopy and laser stone removal    Patient instructed on     1. NPO post midnight of surgery  2. Aware that he needs medical clearance (has an appointment July 10, 2020)  3. Aware that he has COVID 19 testing on July 14, @ 2:45 PM  4. May take lopressor with a sip of water on morning of procedure

## 2020-07-10 ENCOUNTER — NON-APPOINTMENT (OUTPATIENT)
Age: 70
End: 2020-07-10

## 2020-07-10 ENCOUNTER — APPOINTMENT (OUTPATIENT)
Dept: CARDIOLOGY | Facility: CLINIC | Age: 70
End: 2020-07-10
Payer: MEDICARE

## 2020-07-10 VITALS
HEIGHT: 73 IN | SYSTOLIC BLOOD PRESSURE: 118 MMHG | TEMPERATURE: 97.7 F | BODY MASS INDEX: 30.75 KG/M2 | RESPIRATION RATE: 16 BRPM | WEIGHT: 232 LBS | HEART RATE: 60 BPM | DIASTOLIC BLOOD PRESSURE: 78 MMHG | OXYGEN SATURATION: 98 %

## 2020-07-10 VITALS
SYSTOLIC BLOOD PRESSURE: 157 MMHG | BODY MASS INDEX: 30.75 KG/M2 | DIASTOLIC BLOOD PRESSURE: 87 MMHG | WEIGHT: 232 LBS | HEART RATE: 60 BPM | HEIGHT: 73 IN | OXYGEN SATURATION: 98 %

## 2020-07-10 VITALS — SYSTOLIC BLOOD PRESSURE: 118 MMHG | DIASTOLIC BLOOD PRESSURE: 78 MMHG

## 2020-07-10 DIAGNOSIS — N41.9 INFLAMMATORY DISEASE OF PROSTATE, UNSPECIFIED: ICD-10-CM

## 2020-07-10 DIAGNOSIS — Z87.448 PERSONAL HISTORY OF OTHER DISEASES OF URINARY SYSTEM: ICD-10-CM

## 2020-07-10 DIAGNOSIS — Z87.442 PERSONAL HISTORY OF URINARY CALCULI: ICD-10-CM

## 2020-07-10 DIAGNOSIS — Z01.818 ENCOUNTER FOR OTHER PREPROCEDURAL EXAMINATION: ICD-10-CM

## 2020-07-10 LAB
CULTURE RESULTS: NO GROWTH — SIGNIFICANT CHANGE UP
SPECIMEN SOURCE: SIGNIFICANT CHANGE UP

## 2020-07-10 PROCEDURE — 93000 ELECTROCARDIOGRAM COMPLETE: CPT

## 2020-07-10 PROCEDURE — 99214 OFFICE O/P EST MOD 30 MIN: CPT

## 2020-07-10 RX ORDER — POTASSIUM CITRATE 15 MEQ/1
15 MEQ TABLET, EXTENDED RELEASE ORAL
Qty: 90 | Refills: 3 | Status: DISCONTINUED | COMMUNITY
Start: 2020-06-18 | End: 2020-07-10

## 2020-07-10 RX ORDER — FINASTERIDE 5 MG/1
5 TABLET, FILM COATED ORAL
Qty: 90 | Refills: 3 | Status: DISCONTINUED | COMMUNITY
Start: 2020-06-18 | End: 2020-07-10

## 2020-07-10 RX ORDER — ALLOPURINOL 100 MG/1
100 TABLET ORAL DAILY
Qty: 90 | Refills: 3 | Status: DISCONTINUED | COMMUNITY
Start: 2020-06-18 | End: 2020-07-10

## 2020-07-10 RX ORDER — TAMSULOSIN HYDROCHLORIDE 0.4 MG/1
0.4 CAPSULE ORAL
Qty: 90 | Refills: 2 | Status: DISCONTINUED | COMMUNITY
Start: 2020-06-19 | End: 2020-07-10

## 2020-07-10 RX ORDER — CEFUROXIME AXETIL 500 MG/1
500 TABLET ORAL
Qty: 20 | Refills: 0 | Status: DISCONTINUED | COMMUNITY
Start: 2020-06-02 | End: 2020-07-10

## 2020-07-10 RX ORDER — HYDROCHLOROTHIAZIDE 25 MG/1
25 TABLET ORAL
Qty: 90 | Refills: 3 | Status: DISCONTINUED | COMMUNITY
Start: 2020-06-18 | End: 2020-07-10

## 2020-07-14 ENCOUNTER — OUTPATIENT (OUTPATIENT)
Dept: OUTPATIENT SERVICES | Facility: HOSPITAL | Age: 70
LOS: 1 days | End: 2020-07-14
Payer: MEDICARE

## 2020-07-14 DIAGNOSIS — Z96.643 PRESENCE OF ARTIFICIAL HIP JOINT, BILATERAL: Chronic | ICD-10-CM

## 2020-07-14 DIAGNOSIS — Z41.9 ENCOUNTER FOR PROCEDURE FOR PURPOSES OTHER THAN REMEDYING HEALTH STATE, UNSPECIFIED: Chronic | ICD-10-CM

## 2020-07-14 DIAGNOSIS — Z98.890 OTHER SPECIFIED POSTPROCEDURAL STATES: Chronic | ICD-10-CM

## 2020-07-14 DIAGNOSIS — Z11.59 ENCOUNTER FOR SCREENING FOR OTHER VIRAL DISEASES: ICD-10-CM

## 2020-07-14 PROCEDURE — U0003: CPT

## 2020-07-15 DIAGNOSIS — Z11.59 ENCOUNTER FOR SCREENING FOR OTHER VIRAL DISEASES: ICD-10-CM

## 2020-07-15 LAB — SARS-COV-2 RNA SPEC QL NAA+PROBE: SIGNIFICANT CHANGE UP

## 2020-07-15 NOTE — DISCUSSION/SUMMARY
[Optimized for Surgery] : the patient is optimized for surgery [Patient Intermediate Risk] : the patient is an intermediate risk [FreeTextEntry1] : Esquivel periop risk score is 1.2 % for a cv event. [Continue] : Continue medications as currently directed [As per surgery] : as per surgery

## 2020-07-15 NOTE — HISTORY OF PRESENT ILLNESS
[Chest Pain Starting When At Rest] : denies chest pain at rest [Difficulty Breathing (Dyspnea)] : denies dyspnea [Feeling Tired (Fatigue)] : denies fatigue [Chest Pain Which Starts With Exertion] : denies exertional chest pain [Palpitations] : denies palpitations [Feelings Of Weakness On Exertion] : denies reduced exercise tolerance [Regional Soft Tissue Swelling Both Lower Extremities] : denies edema [Leg Pain With Exercise (Leg Claudication)] : denies claudication [Difficulty Breathing Only When Lying Down (Supine Dyspnea)] : denies orthopnea [Dizziness] : denies dizziness [Dizziness Upon Standing Up] : denies orthostatic dizziness [Adherent] : the patient is adherent with ~his/her~ medication regimen [de-identified] : Saw immunology and it is likely that he has mastocytosis and this is the reasoning for allergy to aspirin. He is seeing hematology this week in this regard. [Preoperative Visit] : for a medical evaluation prior to surgery [Stable] : Stable [Surgeon Name ___] : surgeon: [unfilled] [Chest Pain] : no chest pain [Dyspnea] : no dyspnea [Cardiovascular Disease] : cardiovascular disease [Prev Anesthesia Reaction] : no previous anesthesia reaction [Electrocardiogram] : ~T an ECG ~C was performed [Prior Anesthesia] : Prior anesthesia [Cardiac Catheterization  (Diagnostic)] : cardiac catheterization ~T ~C was performed [Echocardiogram] : ~T an echocardiogram ~C was performed

## 2020-07-15 NOTE — HISTORY OF PRESENT ILLNESS
[Chest Pain Starting When At Rest] : denies chest pain at rest [Chest Pain Which Starts With Exertion] : denies exertional chest pain [Difficulty Breathing (Dyspnea)] : denies dyspnea [Feeling Tired (Fatigue)] : denies fatigue [Regional Soft Tissue Swelling Both Lower Extremities] : denies edema [Palpitations] : denies palpitations [Feelings Of Weakness On Exertion] : denies reduced exercise tolerance [Leg Pain With Exercise (Leg Claudication)] : denies claudication [Dizziness] : denies dizziness [Difficulty Breathing Only When Lying Down (Supine Dyspnea)] : denies orthopnea [Adherent] : the patient is adherent with ~his/her~ medication regimen [de-identified] : Saw immunology and it is likely that he has mastocytosis and this is the reasoning for allergy to aspirin. He is seeing hematology this week in this regard. [Dizziness Upon Standing Up] : denies orthostatic dizziness [Preoperative Visit] : for a medical evaluation prior to surgery [Stable] : Stable [Surgeon Name ___] : surgeon: [unfilled] [Cardiovascular Disease] : cardiovascular disease [Dyspnea] : no dyspnea [Chest Pain] : no chest pain [Electrocardiogram] : ~T an ECG ~C was performed [Prev Anesthesia Reaction] : no previous anesthesia reaction [Prior Anesthesia] : Prior anesthesia [Echocardiogram] : ~T an echocardiogram ~C was performed [Cardiac Catheterization  (Diagnostic)] : cardiac catheterization ~T ~C was performed

## 2020-07-15 NOTE — PHYSICAL EXAM
[General Appearance - Well Nourished] : well nourished [General Appearance - Well Developed] : well developed [Normal Conjunctiva] : the conjunctiva exhibited no abnormalities [Normal Oral Mucosa] : normal oral mucosa [No Oral Pallor] : no oral pallor [Eyelids - No Xanthelasma] : the eyelids demonstrated no xanthelasmas [Normal Jugular Venous A Waves Present] : normal jugular venous A waves present [Normal Jugular Venous V Waves Present] : normal jugular venous V waves present [Bowel Sounds] : normal bowel sounds [Auscultation Breath Sounds / Voice Sounds] : lungs were clear to auscultation bilaterally [Nail Clubbing] : no clubbing of the fingernails [Abnormal Walk] : normal gait [Abdomen Soft] : soft [Oriented To Time, Place, And Person] : oriented to person, place, and time [Cyanosis, Localized] : no localized cyanosis [] : no rash [Rhythm Regular] : regular [5th Left ICS - MCL] : palpated at the 5th LICS in the midclavicular line [No Anxiety] : not feeling anxious [No Murmur] : no murmurs heard [Normal S2] : normal S2 [Normal S1] : normal S1 [Right Carotid Bruit] : no bruit heard over the right carotid [Left Carotid Bruit] : no bruit heard over the left carotid [2+] : left 2+

## 2020-07-17 ENCOUNTER — APPOINTMENT (OUTPATIENT)
Dept: UROLOGY | Facility: HOSPITAL | Age: 70
End: 2020-07-17

## 2020-07-17 ENCOUNTER — OUTPATIENT (OUTPATIENT)
Dept: INPATIENT UNIT | Facility: HOSPITAL | Age: 70
LOS: 1 days | Discharge: HOME | End: 2020-07-17
Payer: MEDICARE

## 2020-07-17 ENCOUNTER — RESULT REVIEW (OUTPATIENT)
Age: 70
End: 2020-07-17

## 2020-07-17 VITALS
RESPIRATION RATE: 14 BRPM | DIASTOLIC BLOOD PRESSURE: 90 MMHG | TEMPERATURE: 98 F | OXYGEN SATURATION: 97 % | HEART RATE: 71 BPM | SYSTOLIC BLOOD PRESSURE: 169 MMHG | WEIGHT: 229.5 LBS

## 2020-07-17 VITALS
DIASTOLIC BLOOD PRESSURE: 88 MMHG | OXYGEN SATURATION: 97 % | SYSTOLIC BLOOD PRESSURE: 166 MMHG | RESPIRATION RATE: 12 BRPM | HEART RATE: 66 BPM

## 2020-07-17 DIAGNOSIS — Z87.442 PERSONAL HISTORY OF URINARY CALCULI: ICD-10-CM

## 2020-07-17 DIAGNOSIS — Z98.890 OTHER SPECIFIED POSTPROCEDURAL STATES: Chronic | ICD-10-CM

## 2020-07-17 DIAGNOSIS — Z41.9 ENCOUNTER FOR PROCEDURE FOR PURPOSES OTHER THAN REMEDYING HEALTH STATE, UNSPECIFIED: Chronic | ICD-10-CM

## 2020-07-17 DIAGNOSIS — Z96.643 PRESENCE OF ARTIFICIAL HIP JOINT, BILATERAL: Chronic | ICD-10-CM

## 2020-07-17 LAB
APTT BLD: 41.9 SEC — HIGH (ref 27.5–35.5)
INR BLD: 1.13 RATIO — SIGNIFICANT CHANGE UP (ref 0.88–1.16)
PROTHROM AB SERPL-ACNC: 13.1 SEC — SIGNIFICANT CHANGE UP (ref 10.6–13.6)

## 2020-07-17 PROCEDURE — 85730 THROMBOPLASTIN TIME PARTIAL: CPT

## 2020-07-17 PROCEDURE — C1889: CPT

## 2020-07-17 PROCEDURE — 85610 PROTHROMBIN TIME: CPT

## 2020-07-17 PROCEDURE — C1769: CPT

## 2020-07-17 PROCEDURE — 76000 FLUOROSCOPY <1 HR PHYS/QHP: CPT

## 2020-07-17 PROCEDURE — C2617: CPT

## 2020-07-17 PROCEDURE — 36415 COLL VENOUS BLD VENIPUNCTURE: CPT

## 2020-07-17 PROCEDURE — 52332 CYSTOSCOPY AND TREATMENT: CPT | Mod: LT

## 2020-07-17 RX ORDER — ONDANSETRON 8 MG/1
4 TABLET, FILM COATED ORAL ONCE
Refills: 0 | Status: DISCONTINUED | OUTPATIENT
Start: 2020-07-17 | End: 2020-07-17

## 2020-07-17 RX ORDER — CIPROFLOXACIN LACTATE 400MG/40ML
1 VIAL (ML) INTRAVENOUS
Qty: 0 | Refills: 0 | DISCHARGE
Start: 2020-07-17 | End: 2020-07-26

## 2020-07-17 RX ORDER — OXYCODONE HYDROCHLORIDE 5 MG/1
5 TABLET ORAL ONCE
Refills: 0 | Status: DISCONTINUED | OUTPATIENT
Start: 2020-07-17 | End: 2020-07-17

## 2020-07-17 RX ORDER — SODIUM CHLORIDE 9 MG/ML
1000 INJECTION, SOLUTION INTRAVENOUS
Refills: 0 | Status: DISCONTINUED | OUTPATIENT
Start: 2020-07-17 | End: 2020-07-17

## 2020-07-17 RX ORDER — OXYBUTYNIN CHLORIDE 5 MG
1 TABLET ORAL
Qty: 15 | Refills: 0
Start: 2020-07-17 | End: 2020-07-21

## 2020-07-17 RX ORDER — FAMOTIDINE 10 MG/ML
20 INJECTION INTRAVENOUS ONCE
Refills: 0 | Status: COMPLETED | OUTPATIENT
Start: 2020-07-17 | End: 2020-07-17

## 2020-07-17 RX ORDER — PHENAZOPYRIDINE HCL 100 MG
200 TABLET ORAL ONCE
Refills: 0 | Status: COMPLETED | OUTPATIENT
Start: 2020-07-17 | End: 2020-07-17

## 2020-07-17 RX ORDER — CIPROFLOXACIN LACTATE 400MG/40ML
1 VIAL (ML) INTRAVENOUS
Qty: 10 | Refills: 0
Start: 2020-07-17 | End: 2020-07-21

## 2020-07-17 RX ORDER — ACETAMINOPHEN 500 MG
975 TABLET ORAL ONCE
Refills: 0 | Status: COMPLETED | OUTPATIENT
Start: 2020-07-17 | End: 2020-07-17

## 2020-07-17 RX ORDER — OXYBUTYNIN CHLORIDE 5 MG
5 TABLET ORAL ONCE
Refills: 0 | Status: COMPLETED | OUTPATIENT
Start: 2020-07-17 | End: 2020-07-17

## 2020-07-17 RX ORDER — TRAMADOL HYDROCHLORIDE 50 MG/1
1 TABLET ORAL
Qty: 16 | Refills: 0
Start: 2020-07-17 | End: 2020-07-20

## 2020-07-17 RX ORDER — PHENAZOPYRIDINE HCL 100 MG
1 TABLET ORAL
Qty: 9 | Refills: 0
Start: 2020-07-17 | End: 2020-07-19

## 2020-07-17 RX ORDER — FENTANYL CITRATE 50 UG/ML
50 INJECTION INTRAVENOUS
Refills: 0 | Status: DISCONTINUED | OUTPATIENT
Start: 2020-07-17 | End: 2020-07-17

## 2020-07-17 RX ADMIN — FAMOTIDINE 20 MILLIGRAM(S): 10 INJECTION INTRAVENOUS at 07:30

## 2020-07-17 RX ADMIN — Medication 975 MILLIGRAM(S): at 07:29

## 2020-07-17 RX ADMIN — OXYCODONE HYDROCHLORIDE 5 MILLIGRAM(S): 5 TABLET ORAL at 09:53

## 2020-07-17 RX ADMIN — Medication 5 MILLIGRAM(S): at 09:35

## 2020-07-17 RX ADMIN — Medication 975 MILLIGRAM(S): at 07:30

## 2020-07-17 RX ADMIN — Medication 200 MILLIGRAM(S): at 09:30

## 2020-07-17 NOTE — ASU DISCHARGE PLAN (ADULT/PEDIATRIC) - CARE PROVIDER_API CALL
Oliver Brian  UROLOGY  [address] [place] [address]  Phone: [phone]  Fax: [phone]  Follow Up Time: 1 week

## 2020-07-17 NOTE — ASU PATIENT PROFILE, ADULT - PSH
Elective surgery  cardiac stents 5/2020  H/O mitral valve repair  1998  S/P bilateral hip replacements  2005 & 2010

## 2020-07-17 NOTE — ASU PATIENT PROFILE, ADULT - PMH
Bladder stones    BPH (benign prostatic hyperplasia)    CAD, multiple vessel  with 2 stents 5/2020  Hematuria    HLD (hyperlipidemia)    HTN (hypertension)    Sleep apnea  uses cpap  Stroke  2001

## 2020-07-17 NOTE — BRIEF OPERATIVE NOTE - OPERATION/FINDINGS
trilobar hypertrophy of prostate. LEFT ureter too stenotic to allow passage of dual lumen catheter or access sheath. Stent placed, in good position.

## 2020-07-17 NOTE — ASU DISCHARGE PLAN (ADULT/PEDIATRIC) - CALL YOUR DOCTOR IF YOU HAVE ANY OF THE FOLLOWING:
Unable to urinate/Fever greater than (need to indicate Fahrenheit or Celsius)/Pain not relieved by Medications Bleeding that does not stop/Pain not relieved by Medications/Unable to urinate/Fever greater than (need to indicate Fahrenheit or Celsius)

## 2020-07-17 NOTE — BRIEF OPERATIVE NOTE - NSICDXBRIEFPOSTOP_GEN_ALL_CORE_FT
POST-OP DIAGNOSIS:  Ureteral stricture 17-Jul-2020 09:21:52  Oliver Brian  Kidney stones 17-Jul-2020 09:21:41  Oliver Brian

## 2020-07-20 ENCOUNTER — EMERGENCY (EMERGENCY)
Facility: HOSPITAL | Age: 70
LOS: 0 days | Discharge: HOME | End: 2020-07-20
Attending: EMERGENCY MEDICINE
Payer: MEDICARE

## 2020-07-20 VITALS — HEIGHT: 72 IN | WEIGHT: 229.94 LBS

## 2020-07-20 VITALS
HEART RATE: 89 BPM | RESPIRATION RATE: 19 BRPM | OXYGEN SATURATION: 96 % | TEMPERATURE: 99 F | SYSTOLIC BLOOD PRESSURE: 169 MMHG | DIASTOLIC BLOOD PRESSURE: 106 MMHG

## 2020-07-20 DIAGNOSIS — Z88.6 ALLERGY STATUS TO ANALGESIC AGENT: ICD-10-CM

## 2020-07-20 DIAGNOSIS — N40.0 BENIGN PROSTATIC HYPERPLASIA WITHOUT LOWER URINARY TRACT SYMPTOMS: ICD-10-CM

## 2020-07-20 DIAGNOSIS — Z41.9 ENCOUNTER FOR PROCEDURE FOR PURPOSES OTHER THAN REMEDYING HEALTH STATE, UNSPECIFIED: Chronic | ICD-10-CM

## 2020-07-20 DIAGNOSIS — Z95.5 PRESENCE OF CORONARY ANGIOPLASTY IMPLANT AND GRAFT: ICD-10-CM

## 2020-07-20 DIAGNOSIS — Z79.02 LONG TERM (CURRENT) USE OF ANTITHROMBOTICS/ANTIPLATELETS: ICD-10-CM

## 2020-07-20 DIAGNOSIS — Z99.89 DEPENDENCE ON OTHER ENABLING MACHINES AND DEVICES: ICD-10-CM

## 2020-07-20 DIAGNOSIS — G47.30 SLEEP APNEA, UNSPECIFIED: ICD-10-CM

## 2020-07-20 DIAGNOSIS — Z96.643 PRESENCE OF ARTIFICIAL HIP JOINT, BILATERAL: ICD-10-CM

## 2020-07-20 DIAGNOSIS — Z82.49 FAMILY HISTORY OF ISCHEMIC HEART DISEASE AND OTHER DISEASES OF THE CIRCULATORY SYSTEM: ICD-10-CM

## 2020-07-20 DIAGNOSIS — R33.9 RETENTION OF URINE, UNSPECIFIED: ICD-10-CM

## 2020-07-20 DIAGNOSIS — I10 ESSENTIAL (PRIMARY) HYPERTENSION: ICD-10-CM

## 2020-07-20 DIAGNOSIS — I25.10 ATHEROSCLEROTIC HEART DISEASE OF NATIVE CORONARY ARTERY WITHOUT ANGINA PECTORIS: ICD-10-CM

## 2020-07-20 DIAGNOSIS — Z96.643 PRESENCE OF ARTIFICIAL HIP JOINT, BILATERAL: Chronic | ICD-10-CM

## 2020-07-20 DIAGNOSIS — E78.5 HYPERLIPIDEMIA, UNSPECIFIED: ICD-10-CM

## 2020-07-20 DIAGNOSIS — Z98.890 OTHER SPECIFIED POSTPROCEDURAL STATES: Chronic | ICD-10-CM

## 2020-07-20 DIAGNOSIS — R31.9 HEMATURIA, UNSPECIFIED: ICD-10-CM

## 2020-07-20 LAB
ALBUMIN SERPL ELPH-MCNC: 3.7 G/DL — SIGNIFICANT CHANGE UP (ref 3.3–5)
ALP SERPL-CCNC: 81 U/L — SIGNIFICANT CHANGE UP (ref 40–120)
ALT FLD-CCNC: 26 U/L — SIGNIFICANT CHANGE UP (ref 12–78)
ANION GAP SERPL CALC-SCNC: 7 MMOL/L — SIGNIFICANT CHANGE UP (ref 5–17)
APPEARANCE UR: ABNORMAL
AST SERPL-CCNC: 18 U/L — SIGNIFICANT CHANGE UP (ref 15–37)
BASOPHILS # BLD AUTO: 0.04 K/UL — SIGNIFICANT CHANGE UP (ref 0–0.2)
BASOPHILS NFR BLD AUTO: 0.3 % — SIGNIFICANT CHANGE UP (ref 0–2)
BILIRUB SERPL-MCNC: 1.1 MG/DL — SIGNIFICANT CHANGE UP (ref 0.2–1.2)
BILIRUB UR-MCNC: ABNORMAL
BUN SERPL-MCNC: 24 MG/DL — HIGH (ref 7–23)
CALCIUM SERPL-MCNC: 8.9 MG/DL — SIGNIFICANT CHANGE UP (ref 8.5–10.1)
CHLORIDE SERPL-SCNC: 103 MMOL/L — SIGNIFICANT CHANGE UP (ref 96–108)
CO2 SERPL-SCNC: 26 MMOL/L — SIGNIFICANT CHANGE UP (ref 22–31)
COLOR SPEC: ABNORMAL
CREAT SERPL-MCNC: 1.25 MG/DL — SIGNIFICANT CHANGE UP (ref 0.5–1.3)
DIFF PNL FLD: ABNORMAL
EOSINOPHIL # BLD AUTO: 0.42 K/UL — SIGNIFICANT CHANGE UP (ref 0–0.5)
EOSINOPHIL NFR BLD AUTO: 3.3 % — SIGNIFICANT CHANGE UP (ref 0–6)
GLUCOSE SERPL-MCNC: 141 MG/DL — HIGH (ref 70–99)
GLUCOSE UR QL: NEGATIVE MG/DL — SIGNIFICANT CHANGE UP
HCT VFR BLD CALC: 42.2 % — SIGNIFICANT CHANGE UP (ref 39–50)
HGB BLD-MCNC: 14.1 G/DL — SIGNIFICANT CHANGE UP (ref 13–17)
IMM GRANULOCYTES NFR BLD AUTO: 0.3 % — SIGNIFICANT CHANGE UP (ref 0–1.5)
KETONES UR-MCNC: ABNORMAL
LEUKOCYTE ESTERASE UR-ACNC: ABNORMAL
LIDOCAIN IGE QN: 78 U/L — SIGNIFICANT CHANGE UP (ref 73–393)
LYMPHOCYTES # BLD AUTO: 19 % — SIGNIFICANT CHANGE UP (ref 13–44)
LYMPHOCYTES # BLD AUTO: 2.43 K/UL — SIGNIFICANT CHANGE UP (ref 1–3.3)
MCHC RBC-ENTMCNC: 30.4 PG — SIGNIFICANT CHANGE UP (ref 27–34)
MCHC RBC-ENTMCNC: 33.4 GM/DL — SIGNIFICANT CHANGE UP (ref 32–36)
MCV RBC AUTO: 90.9 FL — SIGNIFICANT CHANGE UP (ref 80–100)
MONOCYTES # BLD AUTO: 1.58 K/UL — HIGH (ref 0–0.9)
MONOCYTES NFR BLD AUTO: 12.3 % — SIGNIFICANT CHANGE UP (ref 2–14)
NEUTROPHILS # BLD AUTO: 8.3 K/UL — HIGH (ref 1.8–7.4)
NEUTROPHILS NFR BLD AUTO: 64.8 % — SIGNIFICANT CHANGE UP (ref 43–77)
NITRITE UR-MCNC: NEGATIVE — SIGNIFICANT CHANGE UP
PH UR: 6.5 — SIGNIFICANT CHANGE UP (ref 5–8)
PLATELET # BLD AUTO: 185 K/UL — SIGNIFICANT CHANGE UP (ref 150–400)
POTASSIUM SERPL-MCNC: 3.3 MMOL/L — LOW (ref 3.5–5.3)
POTASSIUM SERPL-SCNC: 3.3 MMOL/L — LOW (ref 3.5–5.3)
PROT SERPL-MCNC: 7.7 GM/DL — SIGNIFICANT CHANGE UP (ref 6–8.3)
PROT UR-MCNC: 500 MG/DL
RBC # BLD: 4.64 M/UL — SIGNIFICANT CHANGE UP (ref 4.2–5.8)
RBC # FLD: 13.2 % — SIGNIFICANT CHANGE UP (ref 10.3–14.5)
SODIUM SERPL-SCNC: 136 MMOL/L — SIGNIFICANT CHANGE UP (ref 135–145)
SP GR SPEC: 1.01 — SIGNIFICANT CHANGE UP (ref 1.01–1.02)
UROBILINOGEN FLD QL: NEGATIVE MG/DL — SIGNIFICANT CHANGE UP
WBC # BLD: 12.81 K/UL — HIGH (ref 3.8–10.5)
WBC # FLD AUTO: 12.81 K/UL — HIGH (ref 3.8–10.5)

## 2020-07-20 PROCEDURE — 86900 BLOOD TYPING SEROLOGIC ABO: CPT

## 2020-07-20 PROCEDURE — 83690 ASSAY OF LIPASE: CPT

## 2020-07-20 PROCEDURE — 85025 COMPLETE CBC W/AUTO DIFF WBC: CPT

## 2020-07-20 PROCEDURE — 86901 BLOOD TYPING SEROLOGIC RH(D): CPT

## 2020-07-20 PROCEDURE — 87086 URINE CULTURE/COLONY COUNT: CPT

## 2020-07-20 PROCEDURE — 36415 COLL VENOUS BLD VENIPUNCTURE: CPT

## 2020-07-20 PROCEDURE — 85730 THROMBOPLASTIN TIME PARTIAL: CPT

## 2020-07-20 PROCEDURE — 99283 EMERGENCY DEPT VISIT LOW MDM: CPT | Mod: 25

## 2020-07-20 PROCEDURE — 74018 RADEX ABDOMEN 1 VIEW: CPT | Mod: 26

## 2020-07-20 PROCEDURE — 85610 PROTHROMBIN TIME: CPT

## 2020-07-20 PROCEDURE — 99283 EMERGENCY DEPT VISIT LOW MDM: CPT

## 2020-07-20 PROCEDURE — 86850 RBC ANTIBODY SCREEN: CPT

## 2020-07-20 PROCEDURE — 74018 RADEX ABDOMEN 1 VIEW: CPT

## 2020-07-20 PROCEDURE — 80053 COMPREHEN METABOLIC PANEL: CPT

## 2020-07-20 PROCEDURE — 81001 URINALYSIS AUTO W/SCOPE: CPT

## 2020-07-20 NOTE — ED PROVIDER NOTE - PATIENT PORTAL LINK FT
You can access the FollowMyHealth Patient Portal offered by [facility] by registering at the following website: [email or web address]. By joining [employer]’s FollowMyHealth portal, you will also be able to view your health information using other applications (apps) compatible with our system.

## 2020-07-20 NOTE — ED PROVIDER NOTE - PHYSICAL EXAMINATION
*GEN: No acute distress, well appearing   *HEAD: Normocephalic, Atraumatic  *EYES/NOSE: b/l Pupils symmetric & Reactive to ligth, EOMI b/l  *THROAT: airway patent, moist mucous membranes  *NECK: Neck supple  *PULMONARY: No Respiratory distress, symmetric b/l chest rise  *CARDIAC: s1s2, regular rhythm   *ABDOMEN:  Tender & distented suprapubic region, , soft, no guarding, no rebound, no masses   *: (chaperone by _SUDEEP Landeros___) circumcised, no testicular mass nor ttp nor abnormal lie, no rash;   *BACK: no CVA tenderness, No midline vertebral tenderness to palpation   *EXTREMITIES: symmetric pulses, 2+ DP & radial pulses, no cyanosis, no edema   *SKIN: no rash, no bruising   *NEUROLOGIC: alert,  full active & passive ROM in all 4 extremities,   *PSYCH: appropriate concern about symptoms, pleasant

## 2020-07-20 NOTE — ED PROVIDER NOTE - CLINICAL SUMMARY MEDICAL DECISION MAKING FREE TEXT BOX
hematuria with intermittent clots. suprapubic distention with urinary incontinence. bladder scan with 750ml urine. hawkins placed with symptomatic improvement. drianing clear urine like holly aid w/o any clots on irrigation. kub shows stent in place.  pt hesitant to go home due to fear of caring for hawkins. h/h stable. urology dr lind pageaspen to arrange outpatient f/u

## 2020-07-20 NOTE — ED PROVIDER NOTE - NS ED ROS FT
Review of Systems:  	•	CONSTITUTIONAL: no fever  	•	SKIN: no rash  	•	RESPIRATORY: no shortness of breath  	•	CARDIAC: no chest pain  	•	GI:  abd pain, no nausea, no vomiting, no diarrhea  	•	GENITO-URINARY:  dysuria, hematuria  	•	MUSCULOSKELETAL:  no back pain  	•	NEUROLOGIC: no weakness  	•	ALLERGY: no rhinorrhea  	•	PSYSCHIATRIC: appropriate concern about symptoms

## 2020-07-20 NOTE — ED PROVIDER NOTE - CARE PROVIDERS DIRECT ADDRESSES
[email or web address],[email or web address]_Unknown

## 2020-07-20 NOTE — ED ADULT NURSE REASSESSMENT NOTE - NS ED NURSE REASSESS COMMENT FT1
Pt d/c with hawkins and educated on proper use of leg bag and drainage.  Pt demonstrated how to use leg bag.  Will follow up with urology outpatient.

## 2020-07-20 NOTE — ED PROVIDER NOTE - CARE PROVIDER_API CALL
Oliver Brian  UROLOGY  [address] [place] [address]  Phone: [phone]  Fax: [phone]  Follow Up Time:     Elie Christiansen)  Cardiology  [address] [place] [address]  Phone: [phone]  Fax: [phone]  Follow Up Time:

## 2020-07-20 NOTE — ED PROVIDER NOTE - NSFOLLOWUPINSTRUCTIONS_ED_ALL_ED_FT
FOLLOW UP WITH PMD, cardiologist & urologist  WITHIN 1-2DAYS, CALL TO MAKE APPOINTMENT  COME BACK TO ED IF YOUR CONDITION WORSENS OR IF YOU DEVELOP FEVER GREATER THAN 100.4F, CHEST PAIN,  SHORTNESS OF BREATH OR ANY OTHER SYMPTOMS CONCERNING TO YOU  TAKE TYLENOL (ACETAMINOPHEN) 650 MG EVERY 6 HOURS AS NEEDED FOR PAIN  consider holding your plavix for 2days till you see your cardiologist & urologist    Urinary Retention in Men    WHAT YOU NEED TO KNOW:    What is urinary retention? Urinary retention is a condition that develops when your bladder does not empty completely when you urinate.         What causes urinary retention?     An enlarged prostate      Blockages, such as a stone, growth, or narrowing of your urethra      A weak bladder muscle      Nerve damage from diabetes, stroke, or spinal cord injury      Bladder diverticula, which are pockets of urine that form in your bladder and do not empty      Certain medicines, such as narcotics, antihistamines, or antidepressants    What are the signs and symptoms of urinary retention?     Frequent urination, or the urge to urinate right after you finish      An urge to urinate, but your urine does not come out or dribbles out slowly and weakly      Frequent urine leaks that happen during the day or while you sleep      Pain or pressure when you urinate      Pain or stiffness in your abdomen, lower back, hips, or upper thighs      Blood in your urine    How is urinary retention diagnosed? Your healthcare provider will ask about your health history and the medicines you take. He will press or tap on your lower abdomen. You may need any of the following tests:     A digital rectal exam is when healthcare providers carefully feel the size of your prostate.      A post void residual test will show how much urine is left in your bladder after you urinate. You will be asked to urinate and then healthcare providers will use a small ultrasound machine to check how much urine is left in your bladder.      Blood or urine tests may show infection or prostate specific antigen (PSA) levels. PSA may be elevated in prostate cancer.      An ultrasound uses sound waves to show pictures on a monitor. An ultrasound may be done to show bladder stones, infection, or other problems.      A CT scan, or CAT scan, is a type of x-ray that is taken of your prostate, kidneys, and bladder. The pictures may show what is causing your urinary retention. You may be given a dye before the pictures are taken to help healthcare providers see the pictures better. Tell the healthcare provider if you have ever had an allergic reaction to contrast dye.    How is urinary retention treated?     A Pearson catheter is a tube put into your bladder to drain urine into a bag. Keep the bag below your waist. This will prevent urine from flowing back into your bladder and causing an infection or other problems. Also, keep the tube free of kinks so the urine will drain properly. Do not pull on the catheter. This can cause pain and bleeding, and may cause the catheter to come out.       Medicines can help decrease the size of your prostate, fight infection, and help you urinate more easily.      Surgery may be needed to treat the condition that is causing your urinary retention.     When should I contact my healthcare provider?     You have a fever.      You have pain when you urinate.      You have blood in your urine.      You have problems with your catheter.      You have questions or concerns about your condition or care.    When should I seek immediate care or call 911?     You have severe abdominal pain.      You are breathing faster than usual.      Your heartbeat is faster than usual.      Your face, hands, feet, or ankles are swollen.     CARE AGREEMENT:    You have the right to help plan your care. Learn about your health condition and how it may be treated. Discuss treatment options with your healthcare providers to decide what care you want to receive. You always have the right to refuse treatment.

## 2020-07-20 NOTE — ED PROVIDER NOTE - CARE PLAN
Principal Discharge DX:	Urinary retention with incomplete bladder emptying  Secondary Diagnosis:	Hematuria

## 2020-07-20 NOTE — ED PROVIDER NOTE - OBJECTIVE STATEMENT
Pertinent HPI/PMH/PSH/FHx/SHx and Review of Systems contained within  HPI:  Patient p/w CC  hematuria x  2 days, new onset. passing intermittent clots & having urinary incontinence with suprapubic pressure & distention. restarted plavix today. 2d ago had cystoscopy & L ureteral stent placed due to kidney stones with dr Brian.   PMH/PSH relevant for: cap s/p pci on plavix, HTN, HLD  ROS negative for: fever, Chest pain, SOB, Nausea, vomiting, diarrhea, abdominal pain, dysuria    FamilyHx and SocialHx not otherwise contributory

## 2020-07-20 NOTE — ED ADULT TRIAGE NOTE - CHIEF COMPLAINT QUOTE
on friday patient had cystoscopy, left ureteroscopy with laser stone removal, was discharged home. reports hematuria and blood dripping from penis since being discharged on friday.  urologist: dr. harmon, procedure by dr. lind

## 2020-07-20 NOTE — ED ADULT NURSE NOTE - OBJECTIVE STATEMENT
Pt presents to ED s/p cystoscopy on Friday, left ureteroscopy with laser stone removal.  Pt has been reporting involuntary hematuria since Friday.  Denies SOB, fever.

## 2020-07-21 LAB
CULTURE RESULTS: NO GROWTH — SIGNIFICANT CHANGE UP
SPECIMEN SOURCE: SIGNIFICANT CHANGE UP

## 2020-07-22 DIAGNOSIS — G47.33 OBSTRUCTIVE SLEEP APNEA (ADULT) (PEDIATRIC): ICD-10-CM

## 2020-07-22 DIAGNOSIS — Z96.643 PRESENCE OF ARTIFICIAL HIP JOINT, BILATERAL: ICD-10-CM

## 2020-07-22 DIAGNOSIS — I25.10 ATHEROSCLEROTIC HEART DISEASE OF NATIVE CORONARY ARTERY WITHOUT ANGINA PECTORIS: ICD-10-CM

## 2020-07-22 DIAGNOSIS — N13.5 CROSSING VESSEL AND STRICTURE OF URETER WITHOUT HYDRONEPHROSIS: ICD-10-CM

## 2020-07-22 DIAGNOSIS — I25.2 OLD MYOCARDIAL INFARCTION: ICD-10-CM

## 2020-07-22 DIAGNOSIS — M18.0 BILATERAL PRIMARY OSTEOARTHRITIS OF FIRST CARPOMETACARPAL JOINTS: ICD-10-CM

## 2020-07-22 DIAGNOSIS — Z86.73 PERSONAL HISTORY OF TRANSIENT ISCHEMIC ATTACK (TIA), AND CEREBRAL INFARCTION WITHOUT RESIDUAL DEFICITS: ICD-10-CM

## 2020-07-22 DIAGNOSIS — E78.2 MIXED HYPERLIPIDEMIA: ICD-10-CM

## 2020-07-22 DIAGNOSIS — N20.0 CALCULUS OF KIDNEY: ICD-10-CM

## 2020-07-22 DIAGNOSIS — N40.0 BENIGN PROSTATIC HYPERPLASIA WITHOUT LOWER URINARY TRACT SYMPTOMS: ICD-10-CM

## 2020-07-22 DIAGNOSIS — Z11.59 ENCOUNTER FOR SCREENING FOR OTHER VIRAL DISEASES: ICD-10-CM

## 2020-07-22 DIAGNOSIS — Z95.5 PRESENCE OF CORONARY ANGIOPLASTY IMPLANT AND GRAFT: ICD-10-CM

## 2020-07-22 DIAGNOSIS — Z88.6 ALLERGY STATUS TO ANALGESIC AGENT: ICD-10-CM

## 2020-07-22 DIAGNOSIS — Z79.02 LONG TERM (CURRENT) USE OF ANTITHROMBOTICS/ANTIPLATELETS: ICD-10-CM

## 2020-07-22 DIAGNOSIS — Z99.89 DEPENDENCE ON OTHER ENABLING MACHINES AND DEVICES: ICD-10-CM

## 2020-07-22 DIAGNOSIS — I10 ESSENTIAL (PRIMARY) HYPERTENSION: ICD-10-CM

## 2020-07-23 ENCOUNTER — APPOINTMENT (OUTPATIENT)
Dept: UROLOGY | Facility: CLINIC | Age: 70
End: 2020-07-23
Payer: MEDICARE

## 2020-07-23 VITALS — TEMPERATURE: 97 F

## 2020-07-23 PROCEDURE — 51703 INSERT BLADDER CATH COMPLEX: CPT

## 2020-07-27 PROBLEM — I63.9 CEREBRAL INFARCTION, UNSPECIFIED: Chronic | Status: ACTIVE | Noted: 2020-06-07

## 2020-07-27 PROBLEM — E78.5 HYPERLIPIDEMIA, UNSPECIFIED: Chronic | Status: ACTIVE | Noted: 2020-07-09

## 2020-07-27 PROBLEM — G47.30 SLEEP APNEA, UNSPECIFIED: Chronic | Status: ACTIVE | Noted: 2020-07-09

## 2020-07-27 PROBLEM — I25.10 ATHEROSCLEROTIC HEART DISEASE OF NATIVE CORONARY ARTERY WITHOUT ANGINA PECTORIS: Chronic | Status: ACTIVE | Noted: 2020-07-09

## 2020-07-27 PROBLEM — R31.9 HEMATURIA, UNSPECIFIED: Chronic | Status: ACTIVE | Noted: 2020-07-09

## 2020-07-27 PROBLEM — N21.0 CALCULUS IN BLADDER: Chronic | Status: ACTIVE | Noted: 2020-07-09

## 2020-07-28 ENCOUNTER — OUTPATIENT (OUTPATIENT)
Dept: OUTPATIENT SERVICES | Facility: HOSPITAL | Age: 70
LOS: 1 days | End: 2020-07-28
Payer: MEDICARE

## 2020-07-28 DIAGNOSIS — Z11.59 ENCOUNTER FOR SCREENING FOR OTHER VIRAL DISEASES: ICD-10-CM

## 2020-07-28 DIAGNOSIS — Z41.9 ENCOUNTER FOR PROCEDURE FOR PURPOSES OTHER THAN REMEDYING HEALTH STATE, UNSPECIFIED: Chronic | ICD-10-CM

## 2020-07-28 DIAGNOSIS — Z96.643 PRESENCE OF ARTIFICIAL HIP JOINT, BILATERAL: Chronic | ICD-10-CM

## 2020-07-28 DIAGNOSIS — Z98.890 OTHER SPECIFIED POSTPROCEDURAL STATES: Chronic | ICD-10-CM

## 2020-07-28 LAB — SARS-COV-2 RNA SPEC QL NAA+PROBE: SIGNIFICANT CHANGE UP

## 2020-07-28 PROCEDURE — U0003: CPT

## 2020-07-28 PROCEDURE — 52356 CYSTO/URETERO W/LITHOTRIPSY: CPT | Mod: LT

## 2020-07-29 DIAGNOSIS — Z11.59 ENCOUNTER FOR SCREENING FOR OTHER VIRAL DISEASES: ICD-10-CM

## 2020-07-31 ENCOUNTER — APPOINTMENT (OUTPATIENT)
Dept: UROLOGY | Facility: HOSPITAL | Age: 70
End: 2020-07-31

## 2020-07-31 ENCOUNTER — RESULT REVIEW (OUTPATIENT)
Age: 70
End: 2020-07-31

## 2020-07-31 ENCOUNTER — OUTPATIENT (OUTPATIENT)
Dept: INPATIENT UNIT | Facility: HOSPITAL | Age: 70
LOS: 1 days | Discharge: HOME | End: 2020-07-31
Payer: MEDICARE

## 2020-07-31 VITALS
OXYGEN SATURATION: 99 % | RESPIRATION RATE: 14 BRPM | DIASTOLIC BLOOD PRESSURE: 96 MMHG | TEMPERATURE: 98 F | HEART RATE: 73 BPM | SYSTOLIC BLOOD PRESSURE: 156 MMHG

## 2020-07-31 VITALS
DIASTOLIC BLOOD PRESSURE: 86 MMHG | TEMPERATURE: 98 F | OXYGEN SATURATION: 96 % | HEART RATE: 73 BPM | RESPIRATION RATE: 14 BRPM | WEIGHT: 229.28 LBS | SYSTOLIC BLOOD PRESSURE: 161 MMHG

## 2020-07-31 DIAGNOSIS — Z96.643 PRESENCE OF ARTIFICIAL HIP JOINT, BILATERAL: Chronic | ICD-10-CM

## 2020-07-31 DIAGNOSIS — Z98.890 OTHER SPECIFIED POSTPROCEDURAL STATES: Chronic | ICD-10-CM

## 2020-07-31 DIAGNOSIS — N20.0 CALCULUS OF KIDNEY: ICD-10-CM

## 2020-07-31 DIAGNOSIS — Z41.9 ENCOUNTER FOR PROCEDURE FOR PURPOSES OTHER THAN REMEDYING HEALTH STATE, UNSPECIFIED: Chronic | ICD-10-CM

## 2020-07-31 PROCEDURE — 52356 CYSTO/URETERO W/LITHOTRIPSY: CPT | Mod: LT

## 2020-07-31 PROCEDURE — 76000 FLUOROSCOPY <1 HR PHYS/QHP: CPT

## 2020-07-31 PROCEDURE — 82365 CALCULUS SPECTROSCOPY: CPT

## 2020-07-31 PROCEDURE — C1889: CPT

## 2020-07-31 PROCEDURE — 88300 SURGICAL PATH GROSS: CPT | Mod: 26

## 2020-07-31 PROCEDURE — 88300 SURGICAL PATH GROSS: CPT

## 2020-07-31 PROCEDURE — C1769: CPT

## 2020-07-31 PROCEDURE — C2617: CPT

## 2020-07-31 RX ORDER — SODIUM CHLORIDE 9 MG/ML
1000 INJECTION, SOLUTION INTRAVENOUS
Refills: 0 | Status: DISCONTINUED | OUTPATIENT
Start: 2020-07-31 | End: 2020-07-31

## 2020-07-31 RX ORDER — ONDANSETRON 8 MG/1
4 TABLET, FILM COATED ORAL EVERY 6 HOURS
Refills: 0 | Status: DISCONTINUED | OUTPATIENT
Start: 2020-07-31 | End: 2020-07-31

## 2020-07-31 RX ORDER — OXYBUTYNIN CHLORIDE 5 MG
5 TABLET ORAL ONCE
Refills: 0 | Status: COMPLETED | OUTPATIENT
Start: 2020-07-31 | End: 2020-07-31

## 2020-07-31 RX ORDER — DIAZEPAM 5 MG
1 TABLET ORAL
Qty: 2 | Refills: 0
Start: 2020-07-31 | End: 2020-07-31

## 2020-07-31 RX ORDER — PHENAZOPYRIDINE HCL 100 MG
200 TABLET ORAL ONCE
Refills: 0 | Status: COMPLETED | OUTPATIENT
Start: 2020-07-31 | End: 2020-07-31

## 2020-07-31 RX ORDER — ONDANSETRON 8 MG/1
4 TABLET, FILM COATED ORAL ONCE
Refills: 0 | Status: DISCONTINUED | OUTPATIENT
Start: 2020-07-31 | End: 2020-07-31

## 2020-07-31 RX ORDER — OXYBUTYNIN CHLORIDE 5 MG
2 TABLET ORAL
Qty: 0 | Refills: 0 | DISCHARGE
Start: 2020-07-31 | End: 2020-08-04

## 2020-07-31 RX ORDER — OXYBUTYNIN CHLORIDE 5 MG
1 TABLET ORAL
Qty: 15 | Refills: 0
Start: 2020-07-31 | End: 2020-08-04

## 2020-07-31 RX ORDER — FENTANYL CITRATE 50 UG/ML
50 INJECTION INTRAVENOUS
Refills: 0 | Status: DISCONTINUED | OUTPATIENT
Start: 2020-07-31 | End: 2020-07-31

## 2020-07-31 RX ORDER — OXYCODONE HYDROCHLORIDE 5 MG/1
1 TABLET ORAL
Qty: 16 | Refills: 0
Start: 2020-07-31 | End: 2020-08-03

## 2020-07-31 RX ORDER — MORPHINE SULFATE 50 MG/1
4 CAPSULE, EXTENDED RELEASE ORAL EVERY 4 HOURS
Refills: 0 | Status: DISCONTINUED | OUTPATIENT
Start: 2020-07-31 | End: 2020-07-31

## 2020-07-31 RX ORDER — ACETAMINOPHEN 500 MG
975 TABLET ORAL ONCE
Refills: 0 | Status: COMPLETED | OUTPATIENT
Start: 2020-07-31 | End: 2020-07-31

## 2020-07-31 RX ORDER — OXYCODONE AND ACETAMINOPHEN 5; 325 MG/1; MG/1
1 TABLET ORAL EVERY 4 HOURS
Refills: 0 | Status: DISCONTINUED | OUTPATIENT
Start: 2020-07-31 | End: 2020-07-31

## 2020-07-31 RX ORDER — ATROPA BELLADONNA AND OPIUM 16.2; 6 MG/1; MG/1
1 SUPPOSITORY RECTAL ONCE
Refills: 0 | Status: DISCONTINUED | OUTPATIENT
Start: 2020-07-31 | End: 2020-07-31

## 2020-07-31 RX ORDER — OXYCODONE HYDROCHLORIDE 5 MG/1
5 TABLET ORAL ONCE
Refills: 0 | Status: DISCONTINUED | OUTPATIENT
Start: 2020-07-31 | End: 2020-07-31

## 2020-07-31 RX ORDER — PHENAZOPYRIDINE HCL 100 MG
1 TABLET ORAL
Qty: 9 | Refills: 0
Start: 2020-07-31 | End: 2020-08-02

## 2020-07-31 RX ADMIN — OXYCODONE HYDROCHLORIDE 5 MILLIGRAM(S): 5 TABLET ORAL at 11:11

## 2020-07-31 RX ADMIN — FENTANYL CITRATE 50 MICROGRAM(S): 50 INJECTION INTRAVENOUS at 11:35

## 2020-07-31 RX ADMIN — Medication 5 MILLIGRAM(S): at 11:12

## 2020-07-31 RX ADMIN — OXYCODONE HYDROCHLORIDE 5 MILLIGRAM(S): 5 TABLET ORAL at 12:10

## 2020-07-31 RX ADMIN — MORPHINE SULFATE 4 MILLIGRAM(S): 50 CAPSULE, EXTENDED RELEASE ORAL at 13:20

## 2020-07-31 RX ADMIN — FENTANYL CITRATE 50 MICROGRAM(S): 50 INJECTION INTRAVENOUS at 11:40

## 2020-07-31 RX ADMIN — FENTANYL CITRATE 50 MICROGRAM(S): 50 INJECTION INTRAVENOUS at 11:20

## 2020-07-31 RX ADMIN — Medication 975 MILLIGRAM(S): at 06:19

## 2020-07-31 RX ADMIN — ONDANSETRON 4 MILLIGRAM(S): 8 TABLET, FILM COATED ORAL at 13:30

## 2020-07-31 RX ADMIN — FENTANYL CITRATE 50 MICROGRAM(S): 50 INJECTION INTRAVENOUS at 12:10

## 2020-07-31 RX ADMIN — FENTANYL CITRATE 50 MICROGRAM(S): 50 INJECTION INTRAVENOUS at 11:15

## 2020-07-31 RX ADMIN — MORPHINE SULFATE 4 MILLIGRAM(S): 50 CAPSULE, EXTENDED RELEASE ORAL at 13:00

## 2020-07-31 RX ADMIN — Medication 200 MILLIGRAM(S): at 11:12

## 2020-07-31 RX ADMIN — Medication 975 MILLIGRAM(S): at 06:20

## 2020-07-31 RX ADMIN — FENTANYL CITRATE 50 MICROGRAM(S): 50 INJECTION INTRAVENOUS at 11:30

## 2020-07-31 RX ADMIN — ATROPA BELLADONNA AND OPIUM 1 SUPPOSITORY(S): 16.2; 6 SUPPOSITORY RECTAL at 14:00

## 2020-07-31 NOTE — PROVIDER CONTACT NOTE (OTHER) - REASON
pt grimacing complaining of severe pain in his left perineum, /158, hawkins draining bloody urine in small amounts.

## 2020-07-31 NOTE — ASU DISCHARGE PLAN (ADULT/PEDIATRIC) - PROCEDURE
Cystoscopy, LEFT ureteroscopy, laser lithotripsy, basket stone extraction, left ureteral stent exchange

## 2020-07-31 NOTE — BRIEF OPERATIVE NOTE - OPERATION/FINDINGS
Large left renal pelvis stone and large left upper pole stones fragmented with laser, large fragments extracted with basket. Ureteral stent exchanged and in good position

## 2020-07-31 NOTE — PROVIDER CONTACT NOTE (OTHER) - ASSESSMENT
stent placed in left ureter, as pt points to this area to show where pain is located.  Urine bloody, minimal drainage noted during phase II recovery.  Irrigated hawkins with normal saline X2 to check for clots/hawkins patency.  Dr. Brian visited bedside to assess pt.

## 2020-07-31 NOTE — ASU DISCHARGE PLAN (ADULT/PEDIATRIC) - CARE PROVIDER_API CALL
Oliver Brian  UROLOGY  [address] [place] [address]  Phone: [phone]  Fax: [phone]  Follow Up Time: 1 week

## 2020-07-31 NOTE — BRIEF OPERATIVE NOTE - NSICDXBRIEFPROCEDURE_GEN_ALL_CORE_FT
PROCEDURES:  Lithotripsy, with ureteral stent insertion 31-Jul-2020 10:56:50 Cystoscopy, LEFT Ureteroscopy, laser lithotripsy, basket stone extraction, LEFT ureteral stent exchange Oliver Brian

## 2020-08-05 DIAGNOSIS — N40.0 BENIGN PROSTATIC HYPERPLASIA WITHOUT LOWER URINARY TRACT SYMPTOMS: ICD-10-CM

## 2020-08-05 DIAGNOSIS — I25.2 OLD MYOCARDIAL INFARCTION: ICD-10-CM

## 2020-08-05 DIAGNOSIS — N20.0 CALCULUS OF KIDNEY: ICD-10-CM

## 2020-08-05 DIAGNOSIS — Z88.6 ALLERGY STATUS TO ANALGESIC AGENT: ICD-10-CM

## 2020-08-05 DIAGNOSIS — Z95.2 PRESENCE OF PROSTHETIC HEART VALVE: ICD-10-CM

## 2020-08-05 DIAGNOSIS — Z96.643 PRESENCE OF ARTIFICIAL HIP JOINT, BILATERAL: ICD-10-CM

## 2020-08-05 DIAGNOSIS — Z86.73 PERSONAL HISTORY OF TRANSIENT ISCHEMIC ATTACK (TIA), AND CEREBRAL INFARCTION WITHOUT RESIDUAL DEFICITS: ICD-10-CM

## 2020-08-05 DIAGNOSIS — Z99.89 DEPENDENCE ON OTHER ENABLING MACHINES AND DEVICES: ICD-10-CM

## 2020-08-05 DIAGNOSIS — E78.5 HYPERLIPIDEMIA, UNSPECIFIED: ICD-10-CM

## 2020-08-05 DIAGNOSIS — Z95.5 PRESENCE OF CORONARY ANGIOPLASTY IMPLANT AND GRAFT: ICD-10-CM

## 2020-08-05 DIAGNOSIS — Z11.59 ENCOUNTER FOR SCREENING FOR OTHER VIRAL DISEASES: ICD-10-CM

## 2020-08-05 DIAGNOSIS — G47.33 OBSTRUCTIVE SLEEP APNEA (ADULT) (PEDIATRIC): ICD-10-CM

## 2020-08-05 DIAGNOSIS — Z79.02 LONG TERM (CURRENT) USE OF ANTITHROMBOTICS/ANTIPLATELETS: ICD-10-CM

## 2020-08-05 DIAGNOSIS — I10 ESSENTIAL (PRIMARY) HYPERTENSION: ICD-10-CM

## 2020-08-05 DIAGNOSIS — I25.10 ATHEROSCLEROTIC HEART DISEASE OF NATIVE CORONARY ARTERY WITHOUT ANGINA PECTORIS: ICD-10-CM

## 2020-08-06 ENCOUNTER — APPOINTMENT (OUTPATIENT)
Dept: UROLOGY | Facility: CLINIC | Age: 70
End: 2020-08-06
Payer: MEDICARE

## 2020-08-06 ENCOUNTER — INPATIENT (INPATIENT)
Facility: HOSPITAL | Age: 70
LOS: 2 days | Discharge: HOME | DRG: 661 | End: 2020-08-09
Attending: INTERNAL MEDICINE | Admitting: INTERNAL MEDICINE
Payer: MEDICARE

## 2020-08-06 VITALS
HEART RATE: 87 BPM | HEIGHT: 72 IN | RESPIRATION RATE: 18 BRPM | SYSTOLIC BLOOD PRESSURE: 203 MMHG | OXYGEN SATURATION: 100 % | TEMPERATURE: 99 F | WEIGHT: 223.11 LBS | DIASTOLIC BLOOD PRESSURE: 95 MMHG

## 2020-08-06 VITALS
OXYGEN SATURATION: 97 % | WEIGHT: 223 LBS | HEART RATE: 65 BPM | BODY MASS INDEX: 29.55 KG/M2 | SYSTOLIC BLOOD PRESSURE: 138 MMHG | DIASTOLIC BLOOD PRESSURE: 83 MMHG | HEIGHT: 73 IN | TEMPERATURE: 98.2 F

## 2020-08-06 DIAGNOSIS — Z98.890 OTHER SPECIFIED POSTPROCEDURAL STATES: Chronic | ICD-10-CM

## 2020-08-06 DIAGNOSIS — Z96.643 PRESENCE OF ARTIFICIAL HIP JOINT, BILATERAL: Chronic | ICD-10-CM

## 2020-08-06 DIAGNOSIS — Z41.9 ENCOUNTER FOR PROCEDURE FOR PURPOSES OTHER THAN REMEDYING HEALTH STATE, UNSPECIFIED: Chronic | ICD-10-CM

## 2020-08-06 LAB
ALBUMIN SERPL ELPH-MCNC: 3.9 G/DL — SIGNIFICANT CHANGE UP (ref 3.3–5)
ALP SERPL-CCNC: 80 U/L — SIGNIFICANT CHANGE UP (ref 40–120)
ALT FLD-CCNC: 28 U/L — SIGNIFICANT CHANGE UP (ref 12–78)
ANION GAP SERPL CALC-SCNC: 11 MMOL/L — SIGNIFICANT CHANGE UP (ref 5–17)
APPEARANCE UR: ABNORMAL
APTT BLD: 42.2 SEC — HIGH (ref 27.5–35.5)
AST SERPL-CCNC: 20 U/L — SIGNIFICANT CHANGE UP (ref 15–37)
BASOPHILS # BLD AUTO: 0.06 K/UL — SIGNIFICANT CHANGE UP (ref 0–0.2)
BASOPHILS NFR BLD AUTO: 0.4 % — SIGNIFICANT CHANGE UP (ref 0–2)
BILIRUB SERPL-MCNC: 1 MG/DL — SIGNIFICANT CHANGE UP (ref 0.2–1.2)
BILIRUB UR-MCNC: NEGATIVE — SIGNIFICANT CHANGE UP
BUN SERPL-MCNC: 22 MG/DL — SIGNIFICANT CHANGE UP (ref 7–23)
CALCIUM SERPL-MCNC: 9.1 MG/DL — SIGNIFICANT CHANGE UP (ref 8.5–10.1)
CHLORIDE SERPL-SCNC: 104 MMOL/L — SIGNIFICANT CHANGE UP (ref 96–108)
CO2 SERPL-SCNC: 23 MMOL/L — SIGNIFICANT CHANGE UP (ref 22–31)
COLOR SPEC: ABNORMAL
CREAT SERPL-MCNC: 1.34 MG/DL — HIGH (ref 0.5–1.3)
DIFF PNL FLD: ABNORMAL
EOSINOPHIL # BLD AUTO: 0.06 K/UL — SIGNIFICANT CHANGE UP (ref 0–0.5)
EOSINOPHIL NFR BLD AUTO: 0.4 % — SIGNIFICANT CHANGE UP (ref 0–6)
GLUCOSE SERPL-MCNC: 117 MG/DL — HIGH (ref 70–99)
GLUCOSE UR QL: NEGATIVE MG/DL — SIGNIFICANT CHANGE UP
HCT VFR BLD CALC: 39 % — SIGNIFICANT CHANGE UP (ref 39–50)
HGB BLD-MCNC: 12.7 G/DL — LOW (ref 13–17)
IMM GRANULOCYTES NFR BLD AUTO: 0.4 % — SIGNIFICANT CHANGE UP (ref 0–1.5)
INR BLD: 1.76 RATIO — HIGH (ref 0.88–1.16)
KETONES UR-MCNC: ABNORMAL
LEUKOCYTE ESTERASE UR-ACNC: NEGATIVE — SIGNIFICANT CHANGE UP
LYMPHOCYTES # BLD AUTO: 1.25 K/UL — SIGNIFICANT CHANGE UP (ref 1–3.3)
LYMPHOCYTES # BLD AUTO: 7.4 % — LOW (ref 13–44)
MCHC RBC-ENTMCNC: 29.5 PG — SIGNIFICANT CHANGE UP (ref 27–34)
MCHC RBC-ENTMCNC: 32.6 GM/DL — SIGNIFICANT CHANGE UP (ref 32–36)
MCV RBC AUTO: 90.5 FL — SIGNIFICANT CHANGE UP (ref 80–100)
MONOCYTES # BLD AUTO: 1.59 K/UL — HIGH (ref 0–0.9)
MONOCYTES NFR BLD AUTO: 9.4 % — SIGNIFICANT CHANGE UP (ref 2–14)
NEUTROPHILS # BLD AUTO: 13.87 K/UL — HIGH (ref 1.8–7.4)
NEUTROPHILS NFR BLD AUTO: 82 % — HIGH (ref 43–77)
NITRITE UR-MCNC: NEGATIVE — SIGNIFICANT CHANGE UP
PH UR: 8 — SIGNIFICANT CHANGE UP (ref 5–8)
PLATELET # BLD AUTO: 247 K/UL — SIGNIFICANT CHANGE UP (ref 150–400)
POTASSIUM SERPL-MCNC: 3.2 MMOL/L — LOW (ref 3.5–5.3)
POTASSIUM SERPL-SCNC: 3.2 MMOL/L — LOW (ref 3.5–5.3)
PROT SERPL-MCNC: 7.7 GM/DL — SIGNIFICANT CHANGE UP (ref 6–8.3)
PROT UR-MCNC: 100 MG/DL
PROTHROM AB SERPL-ACNC: 20 SEC — HIGH (ref 10.6–13.6)
RBC # BLD: 4.31 M/UL — SIGNIFICANT CHANGE UP (ref 4.2–5.8)
RBC # FLD: 12.6 % — SIGNIFICANT CHANGE UP (ref 10.3–14.5)
SODIUM SERPL-SCNC: 138 MMOL/L — SIGNIFICANT CHANGE UP (ref 135–145)
SP GR SPEC: 1.01 — SIGNIFICANT CHANGE UP (ref 1.01–1.02)
UROBILINOGEN FLD QL: NEGATIVE MG/DL — SIGNIFICANT CHANGE UP
WBC # BLD: 16.9 K/UL — HIGH (ref 3.8–10.5)
WBC # FLD AUTO: 16.9 K/UL — HIGH (ref 3.8–10.5)

## 2020-08-06 PROCEDURE — 74176 CT ABD & PELVIS W/O CONTRAST: CPT | Mod: 26

## 2020-08-06 PROCEDURE — 52310 CYSTOSCOPY AND TREATMENT: CPT

## 2020-08-06 RX ORDER — HYDROMORPHONE HYDROCHLORIDE 2 MG/ML
1 INJECTION INTRAMUSCULAR; INTRAVENOUS; SUBCUTANEOUS ONCE
Refills: 0 | Status: DISCONTINUED | OUTPATIENT
Start: 2020-08-06 | End: 2020-08-06

## 2020-08-06 RX ORDER — MORPHINE SULFATE 50 MG/1
4 CAPSULE, EXTENDED RELEASE ORAL ONCE
Refills: 0 | Status: DISCONTINUED | OUTPATIENT
Start: 2020-08-06 | End: 2020-08-06

## 2020-08-06 RX ORDER — HYDROMORPHONE HYDROCHLORIDE 2 MG/ML
0.5 INJECTION INTRAMUSCULAR; INTRAVENOUS; SUBCUTANEOUS ONCE
Refills: 0 | Status: DISCONTINUED | OUTPATIENT
Start: 2020-08-06 | End: 2020-08-06

## 2020-08-06 RX ADMIN — MORPHINE SULFATE 4 MILLIGRAM(S): 50 CAPSULE, EXTENDED RELEASE ORAL at 21:55

## 2020-08-06 RX ADMIN — MORPHINE SULFATE 4 MILLIGRAM(S): 50 CAPSULE, EXTENDED RELEASE ORAL at 20:42

## 2020-08-06 RX ADMIN — HYDROMORPHONE HYDROCHLORIDE 0.5 MILLIGRAM(S): 2 INJECTION INTRAMUSCULAR; INTRAVENOUS; SUBCUTANEOUS at 23:55

## 2020-08-06 RX ADMIN — HYDROMORPHONE HYDROCHLORIDE 0.5 MILLIGRAM(S): 2 INJECTION INTRAMUSCULAR; INTRAVENOUS; SUBCUTANEOUS at 23:44

## 2020-08-06 RX ADMIN — MORPHINE SULFATE 4 MILLIGRAM(S): 50 CAPSULE, EXTENDED RELEASE ORAL at 19:53

## 2020-08-06 RX ADMIN — HYDROMORPHONE HYDROCHLORIDE 1 MILLIGRAM(S): 2 INJECTION INTRAMUSCULAR; INTRAVENOUS; SUBCUTANEOUS at 21:54

## 2020-08-06 RX ADMIN — MORPHINE SULFATE 4 MILLIGRAM(S): 50 CAPSULE, EXTENDED RELEASE ORAL at 20:00

## 2020-08-06 RX ADMIN — HYDROMORPHONE HYDROCHLORIDE 1 MILLIGRAM(S): 2 INJECTION INTRAMUSCULAR; INTRAVENOUS; SUBCUTANEOUS at 22:00

## 2020-08-06 NOTE — ED PROVIDER NOTE - OBJECTIVE STATEMENT
69 y/o M with a PMHx of bladder stones, BPH, CAD, hematuria, presenting to the ED c/o dysuria, urinary retention starting today. Patient reports that he had Lithotripsy x3-4 days ago, non complicated, had urinary catheter placed.  Patient went to see Dr. Oliver Brian today, had catheter removed. Patient went home and started to have decreased urinary output with suprapubic pain. Pt went back to see Dr. Brian and had urinary catheter replaced, came to the ED for further evaluation. Denies CP, SOB, N/V/D, fever or chills. 69 y/o M with a PMHx of bladder stones, BPH, CAD, hematuria, presenting to the ED c/o dysuria, urinary retention starting today. Patient reports that he had Lithotripsy x3-4 days ago, non complicated, had urinary catheter placed.  Patient went to see Dr. Oliver Brian today, had catheter removed. Patient went home and started to have decreased urinary output with suprapubic pain. Pt went back to see Dr. Brian and had urinary catheter replaced, came to the ED for recurrent pain, decreased output. Denies CP, SOB, N/V/D, fever or chills.

## 2020-08-06 NOTE — ED ADULT NURSE NOTE - OBJECTIVE STATEMENT
Pt presents to ER with Pearson with thigh drainage attached c/o urinary retention, bladder pain and hematuria. Reports having Pearson removed and put back in today. On blood thinners. Thigh bag is filled with bloody urine. Pt c/o severe pain. AO x 3 oriented to baseline, normal breathing pattern with no difficulty.

## 2020-08-06 NOTE — ED PROVIDER NOTE - TEMPLATE
Symptoms Likely 2/2 progression of cancer. Reports improvement in pain today.   Located in posterior neck, left upper back, left shoulder.   Given the extent of the cancer progression and nerve involvement likely neuropathic component.  Received 8 rescue doses of IV Dilaudid 1mg in the last 24 hours.   Continue Methadone 5mg q12 hours. Patient understands that it may take up to 3 to 4 days before Methadone starts taking effect.   Continue IV Dilaudid 1mg q3qh PRN severe pain.   To adjust dose based on increased requirements over the next 24 hours.   Ensure bowel regimen, last BM 4/10/19 per pt.   Colace, Senna, Miralax PRN.

## 2020-08-06 NOTE — ED PROVIDER NOTE - CARE PLAN
Principal Discharge DX:	Abdominal pain Principal Discharge DX:	Hematuria  Secondary Diagnosis:	Renal stone

## 2020-08-06 NOTE — ED PROVIDER NOTE - PROGRESS NOTE DETAILS
d/w Roney - will obtain CT abd/pelvis to eval for clot. Likely admit medicine for pain control CBI initiated with few clots retrieved -- urine now clear. Continues to have significant pain.   d/w Gurgov - will obtain CT abd/pelvis to eval for clot. Likely admit for pain control AS:  pt received at shift change from Dr Paula pending Ct findings.  CT d/w Dr Sim covering for Dr Brian.  Pt TBA hospitalist, NPO with uro will see him in AM

## 2020-08-07 ENCOUNTER — RX CHANGE (OUTPATIENT)
Age: 70
End: 2020-08-07

## 2020-08-07 DIAGNOSIS — R31.9 HEMATURIA, UNSPECIFIED: ICD-10-CM

## 2020-08-07 LAB
ANION GAP SERPL CALC-SCNC: 7 MMOL/L — SIGNIFICANT CHANGE UP (ref 5–17)
BUN SERPL-MCNC: 18 MG/DL — SIGNIFICANT CHANGE UP (ref 7–23)
CALCIUM SERPL-MCNC: 8.3 MG/DL — LOW (ref 8.5–10.1)
CHLORIDE SERPL-SCNC: 105 MMOL/L — SIGNIFICANT CHANGE UP (ref 96–108)
CO2 SERPL-SCNC: 27 MMOL/L — SIGNIFICANT CHANGE UP (ref 22–31)
CREAT SERPL-MCNC: 1.37 MG/DL — HIGH (ref 0.5–1.3)
CULTURE RESULTS: NO GROWTH — SIGNIFICANT CHANGE UP
GLUCOSE SERPL-MCNC: 124 MG/DL — HIGH (ref 70–99)
HCT VFR BLD CALC: 39.1 % — SIGNIFICANT CHANGE UP (ref 39–50)
HGB BLD-MCNC: 12.9 G/DL — LOW (ref 13–17)
INR BLD: 1.33 RATIO — HIGH (ref 0.88–1.16)
LACTATE SERPL-SCNC: 1.2 MMOL/L — SIGNIFICANT CHANGE UP (ref 0.7–2)
MCHC RBC-ENTMCNC: 30.1 PG — SIGNIFICANT CHANGE UP (ref 27–34)
MCHC RBC-ENTMCNC: 33 GM/DL — SIGNIFICANT CHANGE UP (ref 32–36)
MCV RBC AUTO: 91.1 FL — SIGNIFICANT CHANGE UP (ref 80–100)
PLATELET # BLD AUTO: 232 K/UL — SIGNIFICANT CHANGE UP (ref 150–400)
POTASSIUM SERPL-MCNC: 3.3 MMOL/L — LOW (ref 3.5–5.3)
POTASSIUM SERPL-SCNC: 3.3 MMOL/L — LOW (ref 3.5–5.3)
PROTHROM AB SERPL-ACNC: 15.2 SEC — HIGH (ref 10.6–13.6)
RBC # BLD: 4.29 M/UL — SIGNIFICANT CHANGE UP (ref 4.2–5.8)
RBC # FLD: 13.1 % — SIGNIFICANT CHANGE UP (ref 10.3–14.5)
SARS-COV-2 IGG SERPL QL IA: NEGATIVE — SIGNIFICANT CHANGE UP
SARS-COV-2 IGM SERPL IA-ACNC: <0.1 INDEX — SIGNIFICANT CHANGE UP
SARS-COV-2 RNA SPEC QL NAA+PROBE: SIGNIFICANT CHANGE UP
SODIUM SERPL-SCNC: 139 MMOL/L — SIGNIFICANT CHANGE UP (ref 135–145)
SPECIMEN SOURCE: SIGNIFICANT CHANGE UP
WBC # BLD: 18.44 K/UL — HIGH (ref 3.8–10.5)
WBC # FLD AUTO: 18.44 K/UL — HIGH (ref 3.8–10.5)

## 2020-08-07 PROCEDURE — 85025 COMPLETE CBC W/AUTO DIFF WBC: CPT

## 2020-08-07 PROCEDURE — 99223 1ST HOSP IP/OBS HIGH 75: CPT

## 2020-08-07 PROCEDURE — 74176 CT ABD & PELVIS W/O CONTRAST: CPT

## 2020-08-07 PROCEDURE — 12345: CPT | Mod: NC,GC

## 2020-08-07 PROCEDURE — C1758: CPT

## 2020-08-07 PROCEDURE — 99222 1ST HOSP IP/OBS MODERATE 55: CPT

## 2020-08-07 PROCEDURE — C2625: CPT

## 2020-08-07 PROCEDURE — 86769 SARS-COV-2 COVID-19 ANTIBODY: CPT

## 2020-08-07 PROCEDURE — 76000 FLUOROSCOPY <1 HR PHYS/QHP: CPT

## 2020-08-07 PROCEDURE — 80048 BASIC METABOLIC PNL TOTAL CA: CPT

## 2020-08-07 PROCEDURE — 85610 PROTHROMBIN TIME: CPT

## 2020-08-07 PROCEDURE — 88300 SURGICAL PATH GROSS: CPT

## 2020-08-07 PROCEDURE — 83605 ASSAY OF LACTIC ACID: CPT

## 2020-08-07 PROCEDURE — 87040 BLOOD CULTURE FOR BACTERIA: CPT

## 2020-08-07 PROCEDURE — C1769: CPT

## 2020-08-07 PROCEDURE — C1889: CPT

## 2020-08-07 PROCEDURE — 82365 CALCULUS SPECTROSCOPY: CPT

## 2020-08-07 PROCEDURE — 85027 COMPLETE CBC AUTOMATED: CPT

## 2020-08-07 PROCEDURE — 36415 COLL VENOUS BLD VENIPUNCTURE: CPT

## 2020-08-07 RX ORDER — METOPROLOL TARTRATE 50 MG
25 TABLET ORAL DAILY
Refills: 0 | Status: DISCONTINUED | OUTPATIENT
Start: 2020-08-07 | End: 2020-08-08

## 2020-08-07 RX ORDER — CEFAZOLIN SODIUM 1 G
VIAL (EA) INJECTION
Refills: 0 | Status: DISCONTINUED | OUTPATIENT
Start: 2020-08-07 | End: 2020-08-07

## 2020-08-07 RX ORDER — SODIUM CHLORIDE 9 MG/ML
1000 INJECTION INTRAMUSCULAR; INTRAVENOUS; SUBCUTANEOUS
Refills: 0 | Status: DISCONTINUED | OUTPATIENT
Start: 2020-08-07 | End: 2020-08-09

## 2020-08-07 RX ORDER — FINASTERIDE 5 MG/1
5 TABLET, FILM COATED ORAL DAILY
Refills: 0 | Status: DISCONTINUED | OUTPATIENT
Start: 2020-08-07 | End: 2020-08-08

## 2020-08-07 RX ORDER — METOPROLOL TARTRATE 50 MG
1 TABLET ORAL
Qty: 0 | Refills: 0 | DISCHARGE

## 2020-08-07 RX ORDER — CLOPIDOGREL BISULFATE 75 MG/1
75 TABLET, FILM COATED ORAL DAILY
Refills: 0 | Status: DISCONTINUED | OUTPATIENT
Start: 2020-08-07 | End: 2020-08-07

## 2020-08-07 RX ORDER — HYDROMORPHONE HYDROCHLORIDE 2 MG/ML
1 INJECTION INTRAMUSCULAR; INTRAVENOUS; SUBCUTANEOUS EVERY 4 HOURS
Refills: 0 | Status: DISCONTINUED | OUTPATIENT
Start: 2020-08-07 | End: 2020-08-08

## 2020-08-07 RX ORDER — TADALAFIL 10 MG/1
1 TABLET, FILM COATED ORAL
Qty: 0 | Refills: 0 | DISCHARGE

## 2020-08-07 RX ORDER — CEFAZOLIN SODIUM 1 G
1000 VIAL (EA) INJECTION ONCE
Refills: 0 | Status: COMPLETED | OUTPATIENT
Start: 2020-08-07 | End: 2020-08-07

## 2020-08-07 RX ORDER — POTASSIUM CHLORIDE 20 MEQ
40 PACKET (EA) ORAL EVERY 4 HOURS
Refills: 0 | Status: COMPLETED | OUTPATIENT
Start: 2020-08-07 | End: 2020-08-07

## 2020-08-07 RX ORDER — TAMSULOSIN HYDROCHLORIDE 0.4 MG/1
1 CAPSULE ORAL
Qty: 0 | Refills: 0 | DISCHARGE

## 2020-08-07 RX ORDER — ALLOPURINOL 300 MG
100 TABLET ORAL DAILY
Refills: 0 | Status: DISCONTINUED | OUTPATIENT
Start: 2020-08-07 | End: 2020-08-08

## 2020-08-07 RX ORDER — HYDRALAZINE HCL 50 MG
10 TABLET ORAL EVERY 6 HOURS
Refills: 0 | Status: DISCONTINUED | OUTPATIENT
Start: 2020-08-07 | End: 2020-08-08

## 2020-08-07 RX ORDER — ACETAMINOPHEN 500 MG
650 TABLET ORAL EVERY 6 HOURS
Refills: 0 | Status: DISCONTINUED | OUTPATIENT
Start: 2020-08-07 | End: 2020-08-08

## 2020-08-07 RX ORDER — POTASSIUM CHLORIDE 20 MEQ
40 PACKET (EA) ORAL ONCE
Refills: 0 | Status: COMPLETED | OUTPATIENT
Start: 2020-08-07 | End: 2020-08-07

## 2020-08-07 RX ORDER — TAMSULOSIN HYDROCHLORIDE 0.4 MG/1
0.4 CAPSULE ORAL AT BEDTIME
Refills: 0 | Status: DISCONTINUED | OUTPATIENT
Start: 2020-08-07 | End: 2020-08-08

## 2020-08-07 RX ORDER — CEFAZOLIN SODIUM 1 G
1000 VIAL (EA) INJECTION EVERY 8 HOURS
Refills: 0 | Status: DISCONTINUED | OUTPATIENT
Start: 2020-08-07 | End: 2020-08-08

## 2020-08-07 RX ORDER — CEFAZOLIN SODIUM 1 G
VIAL (EA) INJECTION
Refills: 0 | Status: DISCONTINUED | OUTPATIENT
Start: 2020-08-07 | End: 2020-08-08

## 2020-08-07 RX ORDER — FLUOXETINE HCL 10 MG
20 CAPSULE ORAL DAILY
Refills: 0 | Status: DISCONTINUED | OUTPATIENT
Start: 2020-08-07 | End: 2020-08-08

## 2020-08-07 RX ADMIN — Medication 1000 MILLIGRAM(S): at 21:15

## 2020-08-07 RX ADMIN — HYDROMORPHONE HYDROCHLORIDE 1 MILLIGRAM(S): 2 INJECTION INTRAMUSCULAR; INTRAVENOUS; SUBCUTANEOUS at 02:34

## 2020-08-07 RX ADMIN — SODIUM CHLORIDE 75 MILLILITER(S): 9 INJECTION INTRAMUSCULAR; INTRAVENOUS; SUBCUTANEOUS at 07:07

## 2020-08-07 RX ADMIN — Medication 650 MILLIGRAM(S): at 12:15

## 2020-08-07 RX ADMIN — Medication 40 MILLIEQUIVALENT(S): at 19:32

## 2020-08-07 RX ADMIN — Medication 20 MILLIGRAM(S): at 08:32

## 2020-08-07 RX ADMIN — Medication 25 MILLIGRAM(S): at 08:32

## 2020-08-07 RX ADMIN — Medication 100 MILLIGRAM(S): at 08:32

## 2020-08-07 RX ADMIN — Medication 650 MILLIGRAM(S): at 17:06

## 2020-08-07 RX ADMIN — HYDROMORPHONE HYDROCHLORIDE 1 MILLIGRAM(S): 2 INJECTION INTRAMUSCULAR; INTRAVENOUS; SUBCUTANEOUS at 21:14

## 2020-08-07 RX ADMIN — Medication 10 MILLIGRAM(S): at 07:07

## 2020-08-07 RX ADMIN — FINASTERIDE 5 MILLIGRAM(S): 5 TABLET, FILM COATED ORAL at 08:32

## 2020-08-07 RX ADMIN — TAMSULOSIN HYDROCHLORIDE 0.4 MILLIGRAM(S): 0.4 CAPSULE ORAL at 21:16

## 2020-08-07 RX ADMIN — HYDROMORPHONE HYDROCHLORIDE 1 MILLIGRAM(S): 2 INJECTION INTRAMUSCULAR; INTRAVENOUS; SUBCUTANEOUS at 07:22

## 2020-08-07 RX ADMIN — Medication 1000 MILLIGRAM(S): at 14:55

## 2020-08-07 RX ADMIN — HYDROMORPHONE HYDROCHLORIDE 1 MILLIGRAM(S): 2 INJECTION INTRAMUSCULAR; INTRAVENOUS; SUBCUTANEOUS at 10:59

## 2020-08-07 RX ADMIN — Medication 40 MILLIEQUIVALENT(S): at 07:07

## 2020-08-07 RX ADMIN — Medication 40 MILLIEQUIVALENT(S): at 14:56

## 2020-08-07 NOTE — H&P ADULT - NSHPPHYSICALEXAM_GEN_ALL_CORE
Vital Signs Last 24 Hrs  T(C): 37 (07 Aug 2020 01:30), Max: 37 (06 Aug 2020 19:09)  T(F): 98.6 (07 Aug 2020 01:30), Max: 98.6 (06 Aug 2020 19:09)  HR: 88 (07 Aug 2020 01:30) (86 - 90)  BP: 188/94 (07 Aug 2020 01:30) (156/101 - 205/102)  RR: 17 (07 Aug 2020 01:30) (17 - 21)  SpO2: 99% (07 Aug 2020 01:30) (98% - 100%)

## 2020-08-07 NOTE — H&P ADULT - ASSESSMENT
71 yo Male presented with abdominal pain, urinary retention and hematuria.    A/P:    1.  Urinary retention  Hematuria  Ureteric stone  Hydronephrosis  Abdominal pain  Acute Kidney Injury  -keep NPO for possible procedure   -on CBI-continuous bladder irrigation with Pearson cath   -on IVF  -give IV Dilaudid as needed for pain  -hold Xarelto, Plavix ans Aspirin for hematuria  -on Flomax and Finasteride  -hold HCTZ due to TIGRE  -follow Urology consult     2.  HTN  -will adjust meds as needed to have better control of the BP    3.  CAD  -stable  -hold Plavix due to hematuria  -on statin at home    4.  Hypokalemia  -will replace K    5.  DVT ppx: SCD.

## 2020-08-07 NOTE — PROGRESS NOTE ADULT - ASSESSMENT
69 yo Male presented with abdominal pain, urinary retention and hematuria. Hematuria resolved.     A/P:    1.   Ureteric stone causing Hydronephrosis/Acute Kidney Injury  -hematuria seems to have resolved  -keep NPO for possible procedure   -on CBI-continuous bladder irrigation with Pearson cath   -on IVF  -give IV Dilaudid as needed for pain  -hold Xarelto, Plavix ans Aspirin for hematuria  -on Flomax and Finasteride  -hold HCTZ due to TIGRE  -Urology following recs appreciated    2.  HTN  -will adjust meds as needed to have better control of the BP    3.  CAD  -stable  -hold Plavix due to hematuria  -on statin at home    4.  Hypokalemia  -will replace K    5.  DVT ppx: SCD.     d.w dr. hanna

## 2020-08-07 NOTE — ED ADULT NURSE REASSESSMENT NOTE - NS ED NURSE REASSESS COMMENT FT1
Pt received from previous RN.  Pt aaox3.  c/o discomfort 2/2 CBI, in no apparent distress.  Plan of care, transition from ED to admitted status discussed with pt.  All questions answered to pt satisfaction.   Pt identified as fall w harm risk 2/2 CBI and hematuria. Bed in lowest locked position, call bell within patient reach.  Pt instructed re: call bell use. Teaches back proper use and verbalizes agreement to utilize bell and wait safely for staff for assistance with needs. Pt clean and dry, all belongings in reach. Will continue hourly rounding.

## 2020-08-07 NOTE — PROGRESS NOTE ADULT - ASSESSMENT
Pt has been seen and examined with FP resident, resident supervised agree with a/p       Patient is a 70y old  Male who presents with a chief complaint of         PHYSICAL EXAM:  Vital Signs Last 24 Hrs  T(C): 37 (07 Aug 2020 15:23), Max: 38.8 (07 Aug 2020 14:05)  T(F): 98.6 (07 Aug 2020 15:23), Max: 101.8 (07 Aug 2020 14:05)  HR: 83 (07 Aug 2020 15:23) (83 - 90)  BP: 114/76 (07 Aug 2020 15:23) (114/76 - 205/102)  BP(mean): 105 (07 Aug 2020 07:07) (105 - 105)  RR: 18 (07 Aug 2020 15:23) (17 - 21)  SpO2: 93% (07 Aug 2020 15:23) (93% - 100%)  -rs-aeeb, cta  -cvs-s1s2 normal   -p/a-soft,bs+      A/P    #ct abx, supportive care, culture to follow

## 2020-08-07 NOTE — H&P ADULT - HISTORY OF PRESENT ILLNESS
71 y/o M with a PMHx of bladder stones, BPH, CAD, hematuria, presenting to the ED c/o dysuria, urinary retention starting today. Patient reports that he had Lithotripsy x3-4 days ago, non complicated, had urinary catheter placed.  Patient went to see Dr. Oliver Brian today, had catheter removed. Patient went home and started to have decreased urinary output with suprapubic pain. Pt went back to see Dr. Brian and had urinary catheter replaced, came to the ED for recurrent pain, decreased output. Denies CP, SOB, N/V/D, fever or chills. 69 y/o Male with a PMHx of bladder stones, BPH, CAD, hematuria, presenting to the ED with complain of dysuria, urinary retention starting today. He also had hematuria. Patient reports that he had Lithotripsy x3-4 days ago, non complicated, had urinary catheter placed.  Patient went to see Dr. Oliver Brian today, had catheter removed. Patient went home and started to have decreased urinary output with suprapubic pain. Patient went back to see Dr. Brian and had urinary catheter replaced, came to the ED for recurrent pain, decreased output. Denies Chest pain, shortness of breath, N/V/D, fever or chills.

## 2020-08-07 NOTE — PROGRESS NOTE ADULT - ASSESSMENT
LEFT ureteral stone, now with improvement of pain. Possibly passed stone. Will obtain CT. If persists, will plan for ureteroscopy in am

## 2020-08-07 NOTE — PROVIDER CONTACT NOTE (OTHER) - SITUATION
ureteric stone, hematuria, urinary retention    left message with ans service for dr to see pt in the morning

## 2020-08-07 NOTE — H&P ADULT - NEUROLOGICAL DETAILS
responds to pain/alert and oriented x 3/responds to verbal commands/sensation intact/deep reflexes intact/cranial nerves intact/normal strength

## 2020-08-07 NOTE — H&P ADULT - NSICDXPASTMEDICALHX_GEN_ALL_CORE_FT
PAST MEDICAL HISTORY:  Bladder stones     BPH (benign prostatic hyperplasia)     CAD, multiple vessel with 2 stents 5/2020    Hematuria     HLD (hyperlipidemia)     HTN (hypertension)     Sleep apnea uses cpap    Stroke 2001

## 2020-08-07 NOTE — CONSULT NOTE ADULT - SUBJECTIVE AND OBJECTIVE BOX
UROLOGY CONSULTATION    KASSY KRAMER  [id number]    CC    [facility]  Patient is a 70y yo Male who is admitted for Hematuria    Patient seen and examined at bedside.   Patient underwent a left URS LL with Dr Brian 7/31/20.   His ureteral stent was removed 8/6/20  Afterwards he had pelvic pain and vomiting. he was found to also be in retention so a catheter was placed  He came to the ED overnight with complaints of hematuria and pelvic pain  Hawkins was exchanged for a 3 way catheter however minimal urine was drained. CBI was started  He has required parenteral narcotics multiple times overnight    Current complaints: feels better    Denies fevers, chills, nausea, vomiting or weight loss    ROS  12 point ROS negative except that outlined in HPI    PMHX  Hematuria  FH: CAD (coronary artery disease)  Sleep apnea  Bladder stones  Hematuria  HLD (hyperlipidemia)  CAD, multiple vessel  Stroke  HTN (hypertension)  BPH (benign prostatic hyperplasia)  Cerebrovascular accident (CVA)  Elective surgery  S/P bilateral hip replacements  H/O mitral valve repair      MEDS  acetaminophen   Tablet .. 650 milliGRAM(s) Oral every 6 hours PRN  allopurinol 100 milliGRAM(s) Oral daily  finasteride 5 milliGRAM(s) Oral daily  FLUoxetine 20 milliGRAM(s) Oral daily  hydrALAZINE Injectable 10 milliGRAM(s) IV Push every 6 hours PRN  HYDROmorphone  Injectable 1 milliGRAM(s) IV Push every 4 hours PRN  metoprolol tartrate 25 milliGRAM(s) Oral daily  sodium chloride 0.9%. 1000 milliLiter(s) IV Continuous <Continuous>  tamsulosin 0.4 milliGRAM(s) Oral at bedtime      Allergies  aspirin (Angioedema (Mild))        VITALS  T(C): 37 (08-07-20 @ 07:07), Max: 37 (08-06-20 @ 19:09)  HR: 90 (08-07-20 @ 07:07)  BP: 172/80 (08-07-20 @ 07:07)  RR: 17 (08-07-20 @ 07:07)  SpO2: 98% (08-07-20 @ 07:07)      Gen: older Male in NAD, well nourished  HEENT: normocephalic, anicteric sclera, moist mucus membranes; hearing intact  Chest: non labored breathing  Back": + left CVA tenderness  Abd: soft, NT, ND, no masses  : no suprapubic distension or tenderness, hawkins draining clear fluid on gentle CBI, no scrotal tenderness or swelling  Psych: AAOx3, normal affect & mood  Ext: moves all four extremities freely, no peripheral edema    LABS  08-06    138  |  104  |  22  ----------------------------<  117<H>  3.2<L>   |  23  |  1.34<H>    Ca    9.1      06 Aug 2020 19:59    TPro  7.7  /  Alb  3.9  /  TBili  1.0  /  DBili  x   /  AST  20  /  ALT  28  /  AlkPhos  80  08-06    CBC Full  -  ( 06 Aug 2020 19:59 )  WBC Count : 16.90 K/uL  Hemoglobin : 12.7 g/dL  Hematocrit : 39.0 %  Platelet Count - Automated : 247 K/uL  Mean Cell Volume : 90.5 fl  Mean Cell Hemoglobin : 29.5 pg  Mean Cell Hemoglobin Concentration : 32.6 gm/dL  Auto Neutrophil # : 13.87 K/uL  Auto Lymphocyte # : 1.25 K/uL  Auto Monocyte # : 1.59 K/uL  Auto Eosinophil # : 0.06 K/uL  Auto Basophil # : 0.06 K/uL  Auto Neutrophil % : 82.0 %  Auto Lymphocyte % : 7.4 %  Auto Monocyte % : 9.4 %  Auto Eosinophil % : 0.4 %  Auto Basophil % : 0.4 %      RADIOLOGY  CT Abdomen and Pelvis No Cont:   EXAM:  CT ABDOMEN AND PELVIS                            PROCEDURE DATE:  08/06/2020          INTERPRETATION:  CLINICAL INFORMATION: Urinary retention. History of lithotripsy and stent.    COMPARISON: 6/11/2020    PROCEDURE:  CT of the Abdomen and Pelvis was performed without intravenous contrast.  Intravenous contrast: None.  Oral contrast: None.  Sagittal and coronal reformats were performed.    FINDINGS:  LOWER CHEST: Within normal limits.    LIVER: Within normal limits.  BILE DUCTS: Normal caliber.  GALLBLADDER: Multiple small gallstones, similar to prior.  SPLEEN: Within normal limits.  PANCREAS: Within normal limits.  ADRENALS: Within normal limits.  KIDNEYS/URETERS: There is 4 mm sized obstructing stone in the left distal ureter with moderate hydroureteronephrosis and perinephric stranding (2-102). Several nonobstructing left renal stones are seen in the calyces measuring up to 3 mm. There is layering hyperdense collection in the left renal pelvis and the proximal ureter (2-56).    BLADDER: Within normal limits.  REPRODUCTIVE ORGANS: Limited evaluation due to beam hardening artifact.    BOWEL: No bowel obstruction. Appendix is normal.  PERITONEUM: No ascites.  VESSELS: Advanced atherosclerotic calcification.  RETROPERITONEUM/LYMPH NODES: No lymphadenopathy.  ABDOMINAL WALL: Within normal limits.  BONES: Bilateral hip arthroplasty.    IMPRESSION:  4 mm sized obstructing left distal ureter stone with moderate hydroureteronephrosis.    Layering hyperdensity in the dilated left collecting system is likely indicating blood clot.    Other incidental findings are stable.              SAFIA KUO M.D., ATTENDING RADIOLOGIST  This document has been electronically signed. Aug  6 2020 10:58PM             (08-06-20 @ 22:15)        ASSESSMENT & PLAN    Gross hematuria  - CT neg for large bladder clots  - Hawkins currently draining clear    Ureterolithiasis - 4mm LEFT  - Case discussed with Dr Brian. Recommend admission for IVF and pain control  - If patient continues to require pain medications throughout the day he will be taken to the OR for cysto, left stent, possible ureteroscopy LL  - Keep patient NPO  - Ancef 1 G Q8    Nephrolithiasis  - outpatient management with Dr Snehal Brian will f/u later this afternoon    Thank you for allowing me to participate in the care of this patient  Please call if there are questions or concerns    I explained the various issues and complexities regarding their current urological condition(s) and treatment options. The patient verbalized understanding and I answered all of their questions to satisfaction.     Toney Sim MD  [facility]  [phone]    08-07-20 @ 07:41

## 2020-08-07 NOTE — ED ADULT NURSE REASSESSMENT NOTE - NSIMPLEMENTINTERV_GEN_ALL_ED
Implemented All Fall with Harm Risk Interventions:  Humble to call system. Call bell, personal items and telephone within reach. Instruct patient to call for assistance. Room bathroom lighting operational. Non-slip footwear when patient is off stretcher. Physically safe environment: no spills, clutter or unnecessary equipment. Stretcher in lowest position, wheels locked, appropriate side rails in place. Provide visual cue, wrist band, yellow gown, etc. Monitor gait and stability. Monitor for mental status changes and reorient to person, place, and time. Review medications for side effects contributing to fall risk. Reinforce activity limits and safety measures with patient and family. Provide visual clues: red socks.

## 2020-08-08 ENCOUNTER — RESULT REVIEW (OUTPATIENT)
Age: 70
End: 2020-08-08

## 2020-08-08 LAB
ANION GAP SERPL CALC-SCNC: 6 MMOL/L — SIGNIFICANT CHANGE UP (ref 5–17)
BASOPHILS # BLD AUTO: 0.03 K/UL — SIGNIFICANT CHANGE UP (ref 0–0.2)
BASOPHILS NFR BLD AUTO: 0.2 % — SIGNIFICANT CHANGE UP (ref 0–2)
BUN SERPL-MCNC: 18 MG/DL — SIGNIFICANT CHANGE UP (ref 7–23)
CALCIUM SERPL-MCNC: 8.5 MG/DL — SIGNIFICANT CHANGE UP (ref 8.5–10.1)
CHLORIDE SERPL-SCNC: 109 MMOL/L — HIGH (ref 96–108)
CO2 SERPL-SCNC: 26 MMOL/L — SIGNIFICANT CHANGE UP (ref 22–31)
CREAT SERPL-MCNC: 1.3 MG/DL — SIGNIFICANT CHANGE UP (ref 0.5–1.3)
EOSINOPHIL # BLD AUTO: 0.09 K/UL — SIGNIFICANT CHANGE UP (ref 0–0.5)
EOSINOPHIL NFR BLD AUTO: 0.5 % — SIGNIFICANT CHANGE UP (ref 0–6)
GLUCOSE SERPL-MCNC: 96 MG/DL — SIGNIFICANT CHANGE UP (ref 70–99)
HCT VFR BLD CALC: 36.4 % — LOW (ref 39–50)
HGB BLD-MCNC: 11.7 G/DL — LOW (ref 13–17)
IMM GRANULOCYTES NFR BLD AUTO: 0.4 % — SIGNIFICANT CHANGE UP (ref 0–1.5)
LYMPHOCYTES # BLD AUTO: 1.66 K/UL — SIGNIFICANT CHANGE UP (ref 1–3.3)
LYMPHOCYTES # BLD AUTO: 10 % — LOW (ref 13–44)
MCHC RBC-ENTMCNC: 30.2 PG — SIGNIFICANT CHANGE UP (ref 27–34)
MCHC RBC-ENTMCNC: 32.1 GM/DL — SIGNIFICANT CHANGE UP (ref 32–36)
MCV RBC AUTO: 94.1 FL — SIGNIFICANT CHANGE UP (ref 80–100)
MONOCYTES # BLD AUTO: 1.77 K/UL — HIGH (ref 0–0.9)
MONOCYTES NFR BLD AUTO: 10.6 % — SIGNIFICANT CHANGE UP (ref 2–14)
NEUTROPHILS # BLD AUTO: 13 K/UL — HIGH (ref 1.8–7.4)
NEUTROPHILS NFR BLD AUTO: 78.3 % — HIGH (ref 43–77)
PLATELET # BLD AUTO: 207 K/UL — SIGNIFICANT CHANGE UP (ref 150–400)
POTASSIUM SERPL-MCNC: 4.2 MMOL/L — SIGNIFICANT CHANGE UP (ref 3.5–5.3)
POTASSIUM SERPL-SCNC: 4.2 MMOL/L — SIGNIFICANT CHANGE UP (ref 3.5–5.3)
RBC # BLD: 3.87 M/UL — LOW (ref 4.2–5.8)
RBC # FLD: 13.5 % — SIGNIFICANT CHANGE UP (ref 10.3–14.5)
SODIUM SERPL-SCNC: 141 MMOL/L — SIGNIFICANT CHANGE UP (ref 135–145)
WBC # BLD: 16.62 K/UL — HIGH (ref 3.8–10.5)
WBC # FLD AUTO: 16.62 K/UL — HIGH (ref 3.8–10.5)

## 2020-08-08 PROCEDURE — 88300 SURGICAL PATH GROSS: CPT | Mod: 26

## 2020-08-08 PROCEDURE — 74176 CT ABD & PELVIS W/O CONTRAST: CPT | Mod: 26

## 2020-08-08 PROCEDURE — 52352 CYSTOURETERO W/STONE REMOVE: CPT | Mod: LT

## 2020-08-08 PROCEDURE — 99232 SBSQ HOSP IP/OBS MODERATE 35: CPT | Mod: GC

## 2020-08-08 RX ORDER — LIDOCAINE 4 G/100G
1 CREAM TOPICAL EVERY 4 HOURS
Refills: 0 | Status: DISCONTINUED | OUTPATIENT
Start: 2020-08-08 | End: 2020-08-08

## 2020-08-08 RX ORDER — TAMSULOSIN HYDROCHLORIDE 0.4 MG/1
0.4 CAPSULE ORAL AT BEDTIME
Refills: 0 | Status: DISCONTINUED | OUTPATIENT
Start: 2020-08-08 | End: 2020-08-09

## 2020-08-08 RX ORDER — OXYCODONE HYDROCHLORIDE 5 MG/1
10 TABLET ORAL ONCE
Refills: 0 | Status: DISCONTINUED | OUTPATIENT
Start: 2020-08-08 | End: 2020-08-08

## 2020-08-08 RX ORDER — ACETAMINOPHEN 500 MG
1000 TABLET ORAL ONCE
Refills: 0 | Status: COMPLETED | OUTPATIENT
Start: 2020-08-08 | End: 2020-08-08

## 2020-08-08 RX ORDER — SODIUM CHLORIDE 9 MG/ML
1000 INJECTION, SOLUTION INTRAVENOUS
Refills: 0 | Status: DISCONTINUED | OUTPATIENT
Start: 2020-08-08 | End: 2020-08-08

## 2020-08-08 RX ORDER — HYDRALAZINE HCL 50 MG
10 TABLET ORAL EVERY 6 HOURS
Refills: 0 | Status: DISCONTINUED | OUTPATIENT
Start: 2020-08-08 | End: 2020-08-09

## 2020-08-08 RX ORDER — DIPHENHYDRAMINE HCL 50 MG
50 CAPSULE ORAL ONCE
Refills: 0 | Status: COMPLETED | OUTPATIENT
Start: 2020-08-08 | End: 2020-08-08

## 2020-08-08 RX ORDER — FLUOXETINE HCL 10 MG
20 CAPSULE ORAL DAILY
Refills: 0 | Status: DISCONTINUED | OUTPATIENT
Start: 2020-08-08 | End: 2020-08-09

## 2020-08-08 RX ORDER — LIDOCAINE 4 G/100G
1 CREAM TOPICAL EVERY 4 HOURS
Refills: 0 | Status: DISCONTINUED | OUTPATIENT
Start: 2020-08-08 | End: 2020-08-09

## 2020-08-08 RX ORDER — HYDROMORPHONE HYDROCHLORIDE 2 MG/ML
1 INJECTION INTRAMUSCULAR; INTRAVENOUS; SUBCUTANEOUS EVERY 4 HOURS
Refills: 0 | Status: DISCONTINUED | OUTPATIENT
Start: 2020-08-08 | End: 2020-08-09

## 2020-08-08 RX ORDER — METOPROLOL TARTRATE 50 MG
25 TABLET ORAL DAILY
Refills: 0 | Status: DISCONTINUED | OUTPATIENT
Start: 2020-08-08 | End: 2020-08-09

## 2020-08-08 RX ORDER — ONDANSETRON 8 MG/1
4 TABLET, FILM COATED ORAL ONCE
Refills: 0 | Status: DISCONTINUED | OUTPATIENT
Start: 2020-08-08 | End: 2020-08-08

## 2020-08-08 RX ORDER — FENTANYL CITRATE 50 UG/ML
50 INJECTION INTRAVENOUS
Refills: 0 | Status: DISCONTINUED | OUTPATIENT
Start: 2020-08-08 | End: 2020-08-08

## 2020-08-08 RX ORDER — ALLOPURINOL 300 MG
100 TABLET ORAL DAILY
Refills: 0 | Status: DISCONTINUED | OUTPATIENT
Start: 2020-08-08 | End: 2020-08-09

## 2020-08-08 RX ORDER — ACETAMINOPHEN 500 MG
650 TABLET ORAL EVERY 6 HOURS
Refills: 0 | Status: DISCONTINUED | OUTPATIENT
Start: 2020-08-08 | End: 2020-08-09

## 2020-08-08 RX ORDER — FINASTERIDE 5 MG/1
5 TABLET, FILM COATED ORAL DAILY
Refills: 0 | Status: DISCONTINUED | OUTPATIENT
Start: 2020-08-08 | End: 2020-08-09

## 2020-08-08 RX ADMIN — Medication 40 MILLIEQUIVALENT(S): at 01:25

## 2020-08-08 RX ADMIN — SODIUM CHLORIDE 125 MILLILITER(S): 9 INJECTION, SOLUTION INTRAVENOUS at 17:20

## 2020-08-08 RX ADMIN — Medication 1000 MILLIGRAM(S): at 06:35

## 2020-08-08 RX ADMIN — Medication 50 MILLIGRAM(S): at 19:10

## 2020-08-08 RX ADMIN — HYDROMORPHONE HYDROCHLORIDE 1 MILLIGRAM(S): 2 INJECTION INTRAMUSCULAR; INTRAVENOUS; SUBCUTANEOUS at 02:28

## 2020-08-08 RX ADMIN — LIDOCAINE 1 APPLICATION(S): 4 CREAM TOPICAL at 10:52

## 2020-08-08 RX ADMIN — Medication 400 MILLIGRAM(S): at 17:20

## 2020-08-08 RX ADMIN — FINASTERIDE 5 MILLIGRAM(S): 5 TABLET, FILM COATED ORAL at 10:51

## 2020-08-08 RX ADMIN — HYDROMORPHONE HYDROCHLORIDE 1 MILLIGRAM(S): 2 INJECTION INTRAMUSCULAR; INTRAVENOUS; SUBCUTANEOUS at 15:33

## 2020-08-08 RX ADMIN — Medication 100 MILLIGRAM(S): at 10:51

## 2020-08-08 RX ADMIN — HYDROMORPHONE HYDROCHLORIDE 1 MILLIGRAM(S): 2 INJECTION INTRAMUSCULAR; INTRAVENOUS; SUBCUTANEOUS at 06:35

## 2020-08-08 RX ADMIN — HYDROMORPHONE HYDROCHLORIDE 1 MILLIGRAM(S): 2 INJECTION INTRAMUSCULAR; INTRAVENOUS; SUBCUTANEOUS at 09:21

## 2020-08-08 RX ADMIN — Medication 25 MILLIGRAM(S): at 10:51

## 2020-08-08 RX ADMIN — Medication 1000 MILLIGRAM(S): at 15:17

## 2020-08-08 RX ADMIN — Medication 20 MILLIGRAM(S): at 10:51

## 2020-08-08 RX ADMIN — TAMSULOSIN HYDROCHLORIDE 0.4 MILLIGRAM(S): 0.4 CAPSULE ORAL at 22:01

## 2020-08-08 NOTE — PROGRESS NOTE ADULT - ASSESSMENT
LEFT ureteral stone, now with improvement of pain. Possibly passed stone. Will obtain CT. If persists, will plan for ureteroscopy today. if not, can dc home with hawkins to leg bag

## 2020-08-08 NOTE — PROGRESS NOTE ADULT - ASSESSMENT
70M with a PMH of recurrent BPH, nephrolithiasis with resultant Hematuria who presented with worsening Hematuria and abdominal pain. CT abd/pel performed at that time was significant for Left hydroureternephrosis 2/2 4mm obstructing ureter stone with resultant hydronephrosis. Pt was initiated on Ancef and admitted for Left Hydroureternephrosis 2/2 4mm Ureteric stone c/b Hydronephrosis w/ Acute Kidney Injury and Hematuria with Urology consulted.       #Left Hydroureternephrosis 2/2 4mm Ureteric stone c/b Hydronephrosis w/ Acute Kidney Injury and Hematuria   -Persistent Hematuria in hawkins, Creatinine trending down   -CT: Left hydroureternephrosis 2/2 4mm obstructing ureter stone with resultant hydronephrosis. Pt was initiated on Ancef and admitted for   -Repeat CT: Persistent left hydroureteronephrosis secondary to adjacent calculi in the distal left ureter measuring as a conglomerate 0.5 cm. Several small bladder calculi  -Uro Recc: Pt for Uteroscopy with Stent placement this afternoon   -Continue NPO   -Continue with CBI-continuous bladder irrigation with Hawkins cath   -Continue NS Maintenance IVF 75cc/hr   -Pain Managment:  IV Dilaudid as needed for pain  -hold Xarelto, Plavix ans Aspirin for hematuria  -Continue Flomax and Finasteride  -Continue to Hold HCTZ +Lisinopril due to TIGRE        #HTN  -Continue Home dose Metoprolol 25mg qD  -Continue IV Hydralazine 10mg q6h PRN  -Hold home doses of  HCTZ + Lisinopril       #CAD  -stable  -hold Plavix due to hematuria  -Home dose of PravaStatin 80mg?   -Discuss when to re-instate      #Gout   -Continue Home doses of 100mg allopurinol       #DVT ppx:   -SCD. 70M with a PMH of recurrent BPH, nephrolithiasis with resultant Hematuria who presented with worsening Hematuria and abdominal pain. CT abd/pel performed at that time was significant for Left hydroureternephrosis 2/2 4mm obstructing ureter stone with resultant hydronephrosis. Pt was initiated on Ancef and admitted for Left Hydroureternephrosis 2/2 4mm Ureteric stone c/b Hydronephrosis w/ Acute Kidney Injury and Hematuria with Urology consulted.       #Left Hydroureternephrosis 2/2 4mm Ureteric stone c/b Hydronephrosis w/ Acute Kidney Injury and Hematuria   -Persistent Hematuria in hawkins, Creatinine trending down   -CT: Left hydroureternephrosis 2/2 4mm obstructing ureter stone with resultant hydronephrosis. Pt was initiated on Ancef and admitted for   -Repeat CT: Persistent left hydroureteronephrosis secondary to adjacent calculi in the distal left ureter measuring as a conglomerate 0.5 cm. Several small bladder calculi  -Uro Recc: Pt for Uteroscopy with Stent placement this afternoon   -Continue NPO   -Continue with CBI-continuous bladder irrigation with Hawkins cath   -Continue NS Maintenance IVF 75cc/hr   -Pain Managment:  IV Dilaudid as needed for pain  -hold Xarelto, Plavix ans Aspirin for hematuria  -Continue Flomax and Finasteride  -Continue to Hold HCTZ +Lisinopril due to TIGRE    #BPH  -Continue Tamsulosin 0.4mg qHs  -Continue Indwelling Hawkins     #HTN  -Continue Home dose Metoprolol 25mg qD  -Continue IV Hydralazine 10mg q6h PRN  -Hold home doses of  HCTZ + Lisinopril       #CAD  -stable  -hold Plavix due to hematuria  -Home dose of PravaStatin 80mg?   -Discuss when to re-instate      #Gout   -Continue Home doses of 100mg allopurinol       #DVT ppx:   -SCD.

## 2020-08-08 NOTE — PROGRESS NOTE ADULT - ATTENDING COMMENTS
-rs-aeeb, cta  -p/a-soft, bs+  -cvs-s1s2 normal     A/P    #ct abx, possible stent placement today, pt is medically stable for planned procedure

## 2020-08-08 NOTE — BRIEF OPERATIVE NOTE - OPERATION/FINDINGS
obstructing left ureteral stone. Purulent drainage following stone removal. small fragments in kidney

## 2020-08-09 ENCOUNTER — TRANSCRIPTION ENCOUNTER (OUTPATIENT)
Age: 70
End: 2020-08-09

## 2020-08-09 VITALS
TEMPERATURE: 97 F | OXYGEN SATURATION: 97 % | HEART RATE: 84 BPM | SYSTOLIC BLOOD PRESSURE: 124 MMHG | RESPIRATION RATE: 18 BRPM | DIASTOLIC BLOOD PRESSURE: 58 MMHG

## 2020-08-09 LAB
ANION GAP SERPL CALC-SCNC: 8 MMOL/L — SIGNIFICANT CHANGE UP (ref 5–17)
BUN SERPL-MCNC: 20 MG/DL — SIGNIFICANT CHANGE UP (ref 7–23)
CALCIUM SERPL-MCNC: 8.5 MG/DL — SIGNIFICANT CHANGE UP (ref 8.5–10.1)
CHLORIDE SERPL-SCNC: 107 MMOL/L — SIGNIFICANT CHANGE UP (ref 96–108)
CO2 SERPL-SCNC: 25 MMOL/L — SIGNIFICANT CHANGE UP (ref 22–31)
CREAT SERPL-MCNC: 1.08 MG/DL — SIGNIFICANT CHANGE UP (ref 0.5–1.3)
GLUCOSE SERPL-MCNC: 87 MG/DL — SIGNIFICANT CHANGE UP (ref 70–99)
HCT VFR BLD CALC: 34.9 % — LOW (ref 39–50)
HGB BLD-MCNC: 10.9 G/DL — LOW (ref 13–17)
MCHC RBC-ENTMCNC: 30.1 PG — SIGNIFICANT CHANGE UP (ref 27–34)
MCHC RBC-ENTMCNC: 31.2 GM/DL — LOW (ref 32–36)
MCV RBC AUTO: 96.4 FL — SIGNIFICANT CHANGE UP (ref 80–100)
PLATELET # BLD AUTO: 199 K/UL — SIGNIFICANT CHANGE UP (ref 150–400)
POTASSIUM SERPL-MCNC: 4.1 MMOL/L — SIGNIFICANT CHANGE UP (ref 3.5–5.3)
POTASSIUM SERPL-SCNC: 4.1 MMOL/L — SIGNIFICANT CHANGE UP (ref 3.5–5.3)
RBC # BLD: 3.62 M/UL — LOW (ref 4.2–5.8)
RBC # FLD: 13.2 % — SIGNIFICANT CHANGE UP (ref 10.3–14.5)
SODIUM SERPL-SCNC: 140 MMOL/L — SIGNIFICANT CHANGE UP (ref 135–145)
WBC # BLD: 11.96 K/UL — HIGH (ref 3.8–10.5)
WBC # FLD AUTO: 11.96 K/UL — HIGH (ref 3.8–10.5)

## 2020-08-09 PROCEDURE — 99239 HOSP IP/OBS DSCHRG MGMT >30: CPT | Mod: GC

## 2020-08-09 PROCEDURE — 99232 SBSQ HOSP IP/OBS MODERATE 35: CPT

## 2020-08-09 RX ORDER — CEPHALEXIN 500 MG
1 CAPSULE ORAL
Qty: 21 | Refills: 0
Start: 2020-08-09 | End: 2020-08-15

## 2020-08-09 RX ORDER — BENZOCAINE AND MENTHOL 5; 1 G/100ML; G/100ML
1 LIQUID ORAL ONCE
Refills: 0 | Status: COMPLETED | OUTPATIENT
Start: 2020-08-09 | End: 2020-08-09

## 2020-08-09 RX ORDER — MOXIFLOXACIN HYDROCHLORIDE TABLETS, 400 MG 400 MG/1
1 TABLET, FILM COATED ORAL
Qty: 14 | Refills: 0
Start: 2020-08-09 | End: 2020-08-15

## 2020-08-09 RX ADMIN — BENZOCAINE AND MENTHOL 1 LOZENGE: 5; 1 LIQUID ORAL at 02:36

## 2020-08-09 RX ADMIN — Medication 25 MILLIGRAM(S): at 10:41

## 2020-08-09 RX ADMIN — Medication 100 MILLIGRAM(S): at 10:41

## 2020-08-09 RX ADMIN — Medication 20 MILLIGRAM(S): at 10:41

## 2020-08-09 RX ADMIN — FINASTERIDE 5 MILLIGRAM(S): 5 TABLET, FILM COATED ORAL at 10:41

## 2020-08-09 NOTE — DISCHARGE NOTE PROVIDER - NSDCCPCAREPLAN_GEN_ALL_CORE_FT
PRINCIPAL DISCHARGE DIAGNOSIS  Diagnosis: Hematuria  Assessment and Plan of Treatment: - you were admitted to the hospital due to hematuria and abdominal pain. on CT of the abdomen you were found to have a 4 mm stone with evidence of moderate kidney swelling.   - Your urologist put a stent in place and will like to see you back in a week in order to remove the stent. We will discharge you with antibiotic.   - You still have mild hematuria likely secondary t the urological procedure you had done yesterday.      SECONDARY DISCHARGE DIAGNOSES  Diagnosis: Renal stone  Assessment and Plan of Treatment: - see above. PRINCIPAL DISCHARGE DIAGNOSIS  Diagnosis: Hematuria  Assessment and Plan of Treatment: - you were admitted to the hospital due to hematuria and abdominal pain. on CT of the abdomen you were found to have a 4 mm stone with evidence of moderate kidney swelling.   - Your urologist put a stent in place and will like to see you back in a week in order to remove the stent. We will discharge you with antibiotics.  - Take ciprofloxacin 500mg twice daily for 7 days and followup with Dr. Brian within 7 days.   - You still have mild hematuria likely secondary to the urological procedure you had done yesterday.      SECONDARY DISCHARGE DIAGNOSES  Diagnosis: Renal stone  Assessment and Plan of Treatment: - see above.

## 2020-08-09 NOTE — DISCHARGE NOTE PROVIDER - CARE PROVIDER_API CALL
Oliver Brian  UROLOGY  [id number] [address] [place] [address]  Phone: [phone]  Fax: [phone]  Established Patient  Follow Up Time: 1 week

## 2020-08-09 NOTE — PROGRESS NOTE ADULT - ASSESSMENT
69 yo M with obstrucing LEFT ureteral stone fragment following ureteroscopy, now s/p repeat ureterocopy with basket stone extraction and ureteral stent placement. Purulent drainage after stent placement, now urine clear. Leukocytosis resolved. OK to dc from  standpoint. Patient has failed TOV multiple times recently, recommend DC home with hawkins. Also PO abx. Pt can follow up for urodynamics and cysto stent removal next week.

## 2020-08-09 NOTE — DISCHARGE NOTE NURSING/CASE MANAGEMENT/SOCIAL WORK - PATIENT PORTAL LINK FT
You can access the FollowMyHealth Patient Portal offered by [facility] by registering at the following website: [email or web address]. By joining [facility]’s FollowMyHealth portal, you will also be able to view your health information using other applications (apps) compatible with our system.

## 2020-08-09 NOTE — DISCHARGE NOTE PROVIDER - HOSPITAL COURSE
71 y/o Male with a PMHx of bladder stones, BPH, CAD, hematuria, presenting to the ED with complain of dysuria, urinary retention starting today. He also had hematuria. Patient reports that he had Lithotripsy x3-4 days ago, non complicated, had urinary catheter placed.  Patient went to see Dr. Oliver Brian prior to admission, had catheter removed. Patient went home and started to have decreased urinary output with suprapubic pain. Patient went back to see Dr. Brian and had urinary catheter replaced, came to the ED for recurrent pain, decreased output. Denies Chest pain, shortness of breath, N/V/D, fever or chills. Patient was admitted for hematuria. A ct of the abdomen showed a 4mm sized obstructing left distal ureter stone with moderate hydroureteronephrosis. Patient underwent a Uteroscopy with stent placement on 8/8. Will be discharged to today home with hawkins, with oral antibiotics. Pt can follow up for urodynamics and cysto stent removal next week.         subjective: Patient seen and examined at bedside, found to be stable in no acute distress, with mild hematuria noted in Hawkins.  Patient pain now controlled, did not ask for Dilaudid overnight.  Will d/c today.             PHYSICAL EXAM:    Vital Signs Last 24 Hrs    T(C): 36.3 (09 Aug 2020 09:05), Max: 36.9 (08 Aug 2020 17:12)    T(F): 97.4 (09 Aug 2020 09:05), Max: 98.5 (08 Aug 2020 17:12)    HR: 84 (09 Aug 2020 09:05) (68 - 84)    BP: 124/58 (09 Aug 2020 09:05) (100/54 - 124/58)    RR: 18 (09 Aug 2020 09:05) (12 - 18)    SpO2: 97% (09 Aug 2020 09:05) (95% - 99%)        Constitutional: Pt lying in bed, awake and alert, NAD    HEENT: EOMI, normal hearing, moist mucous membranes    Neck: Soft and supple, no JVD    Respiratory: CTABL, No wheezing, rales or rhonchi    Cardiovascular: S1S2+, RRR, no M/G/R    Gastrointestinal: BS+, soft, NT/ND, no guarding, no rebound    Extremities: No peripheral edema    Vascular: 2+ peripheral pulses    Neurological: AAOx3, no focal deficits    Musculoskeletal: 5/5 strength b/l upper and lower extremities    Skin: No rashes        MEDICATIONS:    MEDICATIONS  (STANDING):    allopurinol 100 milliGRAM(s) Oral daily    finasteride 5 milliGRAM(s) Oral daily    FLUoxetine 20 milliGRAM(s) Oral daily    metoprolol tartrate 25 milliGRAM(s) Oral daily    sodium chloride 0.9%. 1000 milliLiter(s) (75 mL/Hr) IV Continuous <Continuous>    tamsulosin 0.4 milliGRAM(s) Oral at bedtime            LABS: All Labs Reviewed:                            10.9 11.96 )-----------( 199      ( 09 Aug 2020 07:39 )               34.9         08-09        140  |  107  |  20    ----------------------------<  87    4.1   |  25  |  1.08        Ca    8.5      09 Aug 2020 07:39                        Blood Culture: 08-07 @ 15:52    Organism --    Gram Stain Blood -- Gram Stain --    Specimen Source .Blood None    Culture-Blood -- Negative        08-06 @ 19:59    Organism --    Gram Stain Blood -- Gram Stain --    Specimen Source .Urine Catheterized    Culture-Blood -- negative            I&O's Summary        08 Aug 2020 07:01  -  09 Aug 2020 07:00    --------------------------------------------------------    IN: 3350 mL / OUT: 3800 mL / NET: -450 mL 69 y/o Male with a PMHx of bladder stones s/p lithotripsy 3-4 days PTA,  BPH, CAD, hematuria, presented to the ED with complaint of dysuria, urinary retention and hematuria starting on day of admission. A ct of the abdomen showed a 4mm sized obstructing left distal ureter stone with moderate hydroureteronephrosis. Patient was admitted for urolithiasis with hydronephrosis. Patient underwent a Uteroscopy with stent placement on 8/8. Received antibiotics as well. Will be discharged to today home with hawkins, with oral antibiotics. Pt can follow up for urodynamics and cysto stent removal next week.         subjective: Patient seen and examined at bedside, found to be stable in no acute distress, with mild hematuria noted in Hawkins.  Patient pain now controlled, did not ask for Dilaudid overnight.  Will d/c today.             PHYSICAL EXAM:    Vital Signs Last 24 Hrs    T(C): 36.3 (09 Aug 2020 09:05), Max: 36.9 (08 Aug 2020 17:12)    T(F): 97.4 (09 Aug 2020 09:05), Max: 98.5 (08 Aug 2020 17:12)    HR: 84 (09 Aug 2020 09:05) (68 - 84)    BP: 124/58 (09 Aug 2020 09:05) (100/54 - 124/58)    RR: 18 (09 Aug 2020 09:05) (12 - 18)    SpO2: 97% (09 Aug 2020 09:05) (95% - 99%)        Constitutional: Pt lying in bed, awake and alert, NAD    HEENT: EOMI, normal hearing, moist mucous membranes    Neck: Soft and supple, no JVD    Respiratory: CTABL, No wheezing, rales or rhonchi    Cardiovascular: S1S2+, RRR, no M/G/R    Gastrointestinal: BS+, soft, NT/ND, no guarding, no rebound    Extremities: No peripheral edema    Vascular: 2+ peripheral pulses    Neurological: AAOx3, no focal deficits    Musculoskeletal: 5/5 strength b/l upper and lower extremities    Skin: No rashes

## 2020-08-09 NOTE — PROGRESS NOTE ADULT - ASSESSMENT
Pt has been seen and examined with FP resident, resident supervised agree with a/p       Patient is a 70y old  Male who presents with a chief complaint of LEFT ureteral stone (09 Aug 2020 08:54)          PHYSICAL EXAM:  Vital Signs Last 24 Hrs  T(C): 36.3 (09 Aug 2020 09:05), Max: 36.9 (08 Aug 2020 17:12)  T(F): 97.4 (09 Aug 2020 09:05), Max: 98.5 (08 Aug 2020 17:12)  HR: 84 (09 Aug 2020 09:05) (68 - 84)  BP: 124/58 (09 Aug 2020 09:05) (100/54 - 124/58)  BP(mean): --  RR: 18 (09 Aug 2020 09:05) (12 - 18)  SpO2: 97% (09 Aug 2020 09:05) (95% - 99%)  -rs-aeeb, cta  -cvs-s1s2 normal   -p/a-soft,bs+      A/P    #d/c today with further management as an outpt, time spent 45 minutes

## 2020-08-09 NOTE — DISCHARGE NOTE PROVIDER - NSDCFUSCHEDAPPT_GEN_ALL_CORE_FT
KASSY KRAMER ; 10/07/2020 ; [facility] K[facility]RAM[facility] ; 10/07/2020 ; [facility] K[facility]RAM[facility] ; 10/07/2020 ; [facility]

## 2020-08-09 NOTE — PROGRESS NOTE ADULT - SUBJECTIVE AND OBJECTIVE BOX
Patient seen and examined at bedside. No acute events overnight, no new complaints. No fevers, no chills, no SOB, no CP, no abd pain, no nausea, no vomiting. Tolerated diet.     Vital Signs Last 24 Hrs  T(C): 36.8 (08 Aug 2020 22:44), Max: 36.9 (08 Aug 2020 17:12)  T(F): 98.2 (08 Aug 2020 22:44), Max: 98.5 (08 Aug 2020 17:12)  HR: 76 (08 Aug 2020 22:44) (68 - 88)  BP: 113/71 (08 Aug 2020 22:44) (100/54 - 123/76)  BP(mean): --  RR: 17 (08 Aug 2020 22:44) (12 - 18)  SpO2: 99% (08 Aug 2020 22:44) (95% - 99%)    NAD, A+Ox3  RRR  no increased WOB  ABD S NT ND  hawkins draining clear urine                          10.9   11.96 )-----------( 199      ( 09 Aug 2020 07:39 )             34.9     08-09    140  |  107  |  20  ----------------------------<  87  4.1   |  25  |  1.08    Ca    8.5      09 Aug 2020 07:39
69 y/o Male with a PMHx of bladder stones, BPH, CAD, hematuria, presenting to the ED with complain of dysuria, urinary retention starting today. He also had hematuria. Patient reports that he had Lithotripsy x3-4 days ago, non complicated, had urinary catheter placed.  Patient went to see Dr. Oliver Brian today, had catheter removed. Patient went home and started to have decreased urinary output with suprapubic pain. Patient went back to see Dr. Brian and had urinary catheter replaced, came to the ED for recurrent pain, decreased output. Denies Chest pain, shortness of breath, N/V/D, fever or chills.    subjective: Patient seen and examined at bedside, found to be stable in no acute distress, with mild hematuria noted in Hawkins. Per the nurse overnight the pt experienced acute worsening of pain managed with PRN dilaudid. Pt is s/p CT abd/pel which confirmed presences of an obstructive uretal stone. Pt is for Urtetal Stent this afternoon by Urology.       Vital Signs Last 24 Hrs  T(C): 36.7 (08 Aug 2020 10:16), Max: 38.9 (07 Aug 2020 16:45)  T(F): 98.1 (08 Aug 2020 10:16), Max: 102 (07 Aug 2020 16:45)  HR: 88 (08 Aug 2020 10:16) (75 - 88)  BP: 123/76 (08 Aug 2020 10:16) (114/76 - 135/78)  RR: 18 (08 Aug 2020 10:16) (18 - 18)  SpO2: 95% (08 Aug 2020 10:16) (93% - 98%)    Physical Exam  Constitutional: Pt lying in bed, awake and alert, NAD  HEENT: EOMI, normal hearing, moist mucous membranes  Neck: Soft and supple, no JVD  Respiratory: CTABL, No wheezing, rales or rhonchi  Cardiovascular: S1S2+, RRR, no M/G/R  Gastrointestinal: BS+, soft, Tender on left lower quadrant, no guarding, no rebound  : +Left CVA tenderness radiating to his left pranav , +hematuria in hawkins   Extremities: No peripheral edema  Vascular: 2+ peripheral pulses  Neurological: AAOx3, no focal deficits  Musculoskeletal: 5/5 strength b/l upper and lower extremities  Skin: No rashes    MEDICATIONS  (STANDING):  allopurinol 100 milliGRAM(s) Oral daily  ceFAZolin  Injectable.      ceFAZolin  Injectable. 1000 milliGRAM(s) IV Push every 8 hours  finasteride 5 milliGRAM(s) Oral daily  FLUoxetine 20 milliGRAM(s) Oral daily  metoprolol tartrate 25 milliGRAM(s) Oral daily  potassium chloride    Tablet ER 40 milliEquivalent(s) Oral every 4 hours  sodium chloride 0.9%. 1000 milliLiter(s) (75 mL/Hr) IV Continuous <Continuous>  tamsulosin 0.4 milliGRAM(s) Oral at bedtime    MEDICATIONS  (PRN):  acetaminophen   Tablet .. 650 milliGRAM(s) Oral every 6 hours PRN Temp greater or equal to 38C (100.4F), Mild Pain (1 - 3)  hydrALAZINE Injectable 10 milliGRAM(s) IV Push every 6 hours PRN Systolic BP>160 mm Hg  HYDROmorphone  Injectable 1 milliGRAM(s) IV Push every 4 hours PRN Severe Pain (7 - 10)      LABS: All Labs Reviewed:                        12.9   18.44 )-----------( 232      ( 07 Aug 2020 07:37 )             39.1     08-07    139  |  105  |  18  ----------------------------<  124<H>  3.3<L>   |  27  |  1.37<H>    Ca    8.3<L>      07 Aug 2020 07:37    TPro  7.7  /  Alb  3.9  /  TBili  1.0  /  DBili  x   /  AST  20  /  ALT  28  /  AlkPhos  80  08-06    PT/INR - ( 07 Aug 2020 07:37 )   PT: 15.2 sec;   INR: 1.33 ratio         PTT - ( 06 Aug 2020 19:59 )  PTT:42.2 sec      Blood Culture:   I&O's Summary    07 Aug 2020 07:01  -  07 Aug 2020 15:20  --------------------------------------------------------  IN: 0 mL / OUT: 1150 mL / NET: -1150 mL      CAPILLARY BLOOD GLUCOSE          RADIOLOGY/EKG:  < from: CT Abdomen and Pelvis No Cont (08.06.20 @ 22:15) >  IMPRESSION:  4 mm sized obstructing left distal ureter stone with moderate hydroureteronephrosis.    Layering hyperdensity in the dilated left collecting system is likely indicating blood clot.    Other incidental findings are stable.    < end of copied text >      Repat CT   < from: CT Abdomen and Pelvis No Cont (08.08.20 @ 09:38) >  IMPRESSION:  Moderate left hydroureteronephrosis secondary to adjacent calculi in the distal left ureter measuring as a conglomerate 0.5 cm. Several small bladder calculi.    Hawkins catheter is obscured by streak artifact from the bilateral hip replacements and likely resides within the prostatic urethra.    Interval development of stranding around the distal descending colon may reflect changes from the ureteral obstruction however correlate clinically to exclude interval diverticulitis.    < end of copied text >
69 y/o Male with a PMHx of bladder stones, BPH, CAD, hematuria, presenting to the ED with complain of dysuria, urinary retention starting today. He also had hematuria. Patient reports that he had Lithotripsy x3-4 days ago, non complicated, had urinary catheter placed.  Patient went to see Dr. Oliver Brian today, had catheter removed. Patient went home and started to have decreased urinary output with suprapubic pain. Patient went back to see Dr. Brian and had urinary catheter replaced, came to the ED for recurrent pain, decreased output. Denies Chest pain, shortness of breath, N/V/D, fever or chills.    subjective: Patient seen and examined at bedside. Patients pain has improved s/p dilaudid PRN. On IV fluids. Started on Ancef 1g q8h. Urine clear, non bloody.        REVIEW OF SYSTEMS:  CONSTITUTIONAL: No weakness, fevers or chills  EYES/ENT: No visual changes;  No vertigo or throat pain   NECK: No pain or stiffness  RESPIRATORY: No cough, wheezing, hemoptysis; No shortness of breath  CARDIOVASCULAR: No chest pain or palpitation  GASTROINTESTINAL: No abdominal or epigastric pain. No nausea, vomiting, or hematemesis; No diarrhea or constipation. No melena or hematochezia.  GENITOURINARY: No dysuria, frequency or hematuria. Indwelling catheter  NEUROLOGICAL: No numbness or weakness  SKIN: No itching, burning, rashes, or lesions   All other review of systems is negative unless indicated above    PHYSICAL EXAM:  Vital Signs Last 24 Hrs  T(C): 38.8 (07 Aug 2020 14:05), Max: 38.8 (07 Aug 2020 14:05)  T(F): 101.8 (07 Aug 2020 14:05), Max: 101.8 (07 Aug 2020 14:05)  HR: 84 (07 Aug 2020 09:23) (84 - 90)  BP: 173/84 (07 Aug 2020 09:23) (156/101 - 205/102)  BP(mean): 105 (07 Aug 2020 07:07) (105 - 105)  RR: 17 (07 Aug 2020 09:23) (17 - 21)  SpO2: 97% (07 Aug 2020 09:23) (97% - 100%)    Constitutional: Pt lying in bed, awake and alert, NAD  HEENT: EOMI, normal hearing, moist mucous membranes  Neck: Soft and supple, no JVD  Respiratory: CTABL, No wheezing, rales or rhonchi  Cardiovascular: S1S2+, RRR, no M/G/R  Gastrointestinal: BS+, soft, Tender on left lower quadrant, no guarding, no rebound  Extremities: No peripheral edema  Vascular: 2+ peripheral pulses  Neurological: AAOx3, no focal deficits  Musculoskeletal: 5/5 strength b/l upper and lower extremities  Skin: No rashes    MEDICATIONS  (STANDING):  allopurinol 100 milliGRAM(s) Oral daily  ceFAZolin  Injectable.      ceFAZolin  Injectable. 1000 milliGRAM(s) IV Push every 8 hours  finasteride 5 milliGRAM(s) Oral daily  FLUoxetine 20 milliGRAM(s) Oral daily  metoprolol tartrate 25 milliGRAM(s) Oral daily  potassium chloride    Tablet ER 40 milliEquivalent(s) Oral every 4 hours  sodium chloride 0.9%. 1000 milliLiter(s) (75 mL/Hr) IV Continuous <Continuous>  tamsulosin 0.4 milliGRAM(s) Oral at bedtime    MEDICATIONS  (PRN):  acetaminophen   Tablet .. 650 milliGRAM(s) Oral every 6 hours PRN Temp greater or equal to 38C (100.4F), Mild Pain (1 - 3)  hydrALAZINE Injectable 10 milliGRAM(s) IV Push every 6 hours PRN Systolic BP>160 mm Hg  HYDROmorphone  Injectable 1 milliGRAM(s) IV Push every 4 hours PRN Severe Pain (7 - 10)      LABS: All Labs Reviewed:                        12.9   18.44 )-----------( 232      ( 07 Aug 2020 07:37 )             39.1     08-07    139  |  105  |  18  ----------------------------<  124<H>  3.3<L>   |  27  |  1.37<H>    Ca    8.3<L>      07 Aug 2020 07:37    TPro  7.7  /  Alb  3.9  /  TBili  1.0  /  DBili  x   /  AST  20  /  ALT  28  /  AlkPhos  80  08-06    PT/INR - ( 07 Aug 2020 07:37 )   PT: 15.2 sec;   INR: 1.33 ratio         PTT - ( 06 Aug 2020 19:59 )  PTT:42.2 sec      Blood Culture:   I&O's Summary    07 Aug 2020 07:01  -  07 Aug 2020 15:20  --------------------------------------------------------  IN: 0 mL / OUT: 1150 mL / NET: -1150 mL      CAPILLARY BLOOD GLUCOSE          RADIOLOGY/EKG:
Pt now s/p ureterosocpy, left ureteral stone was extracted. Purulent urine drained from proximal to stone. Ureteral stent placed.   Please continue abx. If stable overnight and leukocytosis improved in AM, can dc home with hawkins to leg bag. Will see pt in am.
patient seen and examined at bedside. No flank/groin pain, only catheter discomfort. Febrile yesterday afternoon, not overnight. No nausea, no vomiting, tolerating diet.     Vital Signs Last 24 Hrs  T(C): 37 (07 Aug 2020 20:35), Max: 38.9 (07 Aug 2020 16:45)  T(F): 98.6 (07 Aug 2020 20:35), Max: 102 (07 Aug 2020 16:45)  HR: 75 (07 Aug 2020 20:35) (75 - 84)  BP: 135/78 (07 Aug 2020 20:35) (114/76 - 173/84)  BP(mean): --  RR: 18 (07 Aug 2020 20:35) (17 - 18)  SpO2: 98% (07 Aug 2020 20:35) (93% - 98%)    NAD  RRR  no increased WOB  ABD S NT ND  no SP tenderness or distension  pink urine in hawkins                                     12.9   18.44 )-----------( 232      ( 07 Aug 2020 07:37 )             39.1     08-07    139  |  105  |  18  ----------------------------<  124<H>  3.3<L>   |  27  |  1.37<H>    Ca    8.3<L>      07 Aug 2020 07:37    TPro  7.7  /  Alb  3.9  /  TBili  1.0  /  DBili  x   /  AST  20  /  ALT  28  /  AlkPhos  80  08-06
patient seen and examined at bedside. Pain improved, has not required pain medication since this AM. Febrile earlier, not now. No nausea, no vomiting, tolerating diet.     Vital Signs Last 24 Hrs  T(C): 38.9 (07 Aug 2020 16:45), Max: 38.9 (07 Aug 2020 16:45)  T(F): 102 (07 Aug 2020 16:45), Max: 102 (07 Aug 2020 16:45)  HR: 83 (07 Aug 2020 15:23) (83 - 90)  BP: 114/76 (07 Aug 2020 15:23) (114/76 - 205/95)  BP(mean): 105 (07 Aug 2020 07:07) (105 - 105)  RR: 18 (07 Aug 2020 15:23) (17 - 19)  SpO2: 93% (07 Aug 2020 15:23) (93% - 99%)    NAD  RRR  no increased WOB  ABD S NT ND  no SP tenderness or distension  pink urine in hawkins                          12.9   18.44 )-----------( 232      ( 07 Aug 2020 07:37 )             39.1     08-07    139  |  105  |  18  ----------------------------<  124<H>  3.3<L>   |  27  |  1.37<H>    Ca    8.3<L>      07 Aug 2020 07:37    TPro  7.7  /  Alb  3.9  /  TBili  1.0  /  DBili  x   /  AST  20  /  ALT  28  /  AlkPhos  80  08-06

## 2020-08-09 NOTE — DISCHARGE NOTE PROVIDER - NSDCMRMEDTOKEN_GEN_ALL_CORE_FT
allopurinol 100 mg oral tablet: 1 tab(s) orally once a day  clopidogrel 75 mg oral tablet: 1 tab(s) orally once a day  finasteride 5 mg oral tablet: 1 tab(s) orally once a day  FLUoxetine 20 mg oral capsule: 1 cap(s) orally once a day  hydroCHLOROthiazide 25 mg oral tablet: 1 tab(s) orally once a day  Metoprolol Tartrate 25 mg oral tablet: 0.5 tab(s) orally once a day  Multiple Vitamins oral tablet: 1 tab(s) orally once a day  oxybutynin 5 mg oral tablet: 1 tab(s) orally 3 times a day, As Needed for urinary urgency, Stop 24 hours before urethral catheter removal.  phenazopyridine 200 mg oral tablet: 1 tab(s) orally 3 times a day, As Needed for burning with urination  pravastatin 80 mg oral tablet: 1 tab(s) orally once a day  tamsulosin 0.4 mg oral capsule: 1 cap(s) orally once a day  Xarelto 15 mg oral tablet: 1 tab(s) orally once a day (in the evening) allopurinol 100 mg oral tablet: 1 tab(s) orally once a day  Cipro 500 mg oral tablet: 1 tab(s) orally 2 times a day   clopidogrel 75 mg oral tablet: 1 tab(s) orally once a day  finasteride 5 mg oral tablet: 1 tab(s) orally once a day  FLUoxetine 20 mg oral capsule: 1 cap(s) orally once a day  hydroCHLOROthiazide 25 mg oral tablet: 1 tab(s) orally once a day  Metoprolol Tartrate 25 mg oral tablet: 0.5 tab(s) orally once a day  Multiple Vitamins oral tablet: 1 tab(s) orally once a day  oxybutynin 5 mg oral tablet: 1 tab(s) orally 3 times a day, As Needed for urinary urgency, Stop 24 hours before urethral catheter removal.  phenazopyridine 200 mg oral tablet: 1 tab(s) orally 3 times a day, As Needed for burning with urination  pravastatin 80 mg oral tablet: 1 tab(s) orally once a day  tamsulosin 0.4 mg oral capsule: 1 cap(s) orally once a day  Xarelto 15 mg oral tablet: 1 tab(s) orally once a day (in the evening)

## 2020-08-12 LAB
CULTURE RESULTS: SIGNIFICANT CHANGE UP
SPECIMEN SOURCE: SIGNIFICANT CHANGE UP

## 2020-08-13 ENCOUNTER — APPOINTMENT (OUTPATIENT)
Dept: UROLOGY | Facility: CLINIC | Age: 70
End: 2020-08-13
Payer: MEDICARE

## 2020-08-13 ENCOUNTER — LABORATORY RESULT (OUTPATIENT)
Age: 70
End: 2020-08-13

## 2020-08-13 VITALS
HEIGHT: 72 IN | BODY MASS INDEX: 30.2 KG/M2 | WEIGHT: 223 LBS | OXYGEN SATURATION: 96 % | HEART RATE: 63 BPM | SYSTOLIC BLOOD PRESSURE: 158 MMHG | TEMPERATURE: 98.3 F | DIASTOLIC BLOOD PRESSURE: 76 MMHG

## 2020-08-13 DIAGNOSIS — M10.9 GOUT, UNSPECIFIED: ICD-10-CM

## 2020-08-13 DIAGNOSIS — N17.9 ACUTE KIDNEY FAILURE, UNSPECIFIED: ICD-10-CM

## 2020-08-13 DIAGNOSIS — I25.10 ATHEROSCLEROTIC HEART DISEASE OF NATIVE CORONARY ARTERY WITHOUT ANGINA PECTORIS: ICD-10-CM

## 2020-08-13 DIAGNOSIS — N13.2 HYDRONEPHROSIS WITH RENAL AND URETERAL CALCULOUS OBSTRUCTION: ICD-10-CM

## 2020-08-13 DIAGNOSIS — I10 ESSENTIAL (PRIMARY) HYPERTENSION: ICD-10-CM

## 2020-08-13 DIAGNOSIS — Z86.73 PERSONAL HISTORY OF TRANSIENT ISCHEMIC ATTACK (TIA), AND CEREBRAL INFARCTION WITHOUT RESIDUAL DEFICITS: ICD-10-CM

## 2020-08-13 DIAGNOSIS — Z98.890 OTHER SPECIFIED POSTPROCEDURAL STATES: ICD-10-CM

## 2020-08-13 DIAGNOSIS — G47.30 SLEEP APNEA, UNSPECIFIED: ICD-10-CM

## 2020-08-13 DIAGNOSIS — E87.5 HYPERKALEMIA: ICD-10-CM

## 2020-08-13 DIAGNOSIS — Z95.5 PRESENCE OF CORONARY ANGIOPLASTY IMPLANT AND GRAFT: ICD-10-CM

## 2020-08-13 DIAGNOSIS — N40.1 BENIGN PROSTATIC HYPERPLASIA WITH LOWER URINARY TRACT SYMPTOMS: ICD-10-CM

## 2020-08-13 DIAGNOSIS — N21.0 CALCULUS IN BLADDER: ICD-10-CM

## 2020-08-13 DIAGNOSIS — Z96.643 PRESENCE OF ARTIFICIAL HIP JOINT, BILATERAL: ICD-10-CM

## 2020-08-13 PROCEDURE — 51741 ELECTRO-UROFLOWMETRY FIRST: CPT

## 2020-08-13 PROCEDURE — 51728 CYSTOMETROGRAM W/VP: CPT

## 2020-08-13 PROCEDURE — 51797 INTRAABDOMINAL PRESSURE TEST: CPT

## 2020-08-13 PROCEDURE — 51784 ANAL/URINARY MUSCLE STUDY: CPT

## 2020-08-20 ENCOUNTER — NON-APPOINTMENT (OUTPATIENT)
Age: 70
End: 2020-08-20

## 2020-08-20 ENCOUNTER — APPOINTMENT (OUTPATIENT)
Dept: CARDIOLOGY | Facility: CLINIC | Age: 70
End: 2020-08-20
Payer: MEDICARE

## 2020-08-20 VITALS
HEART RATE: 67 BPM | SYSTOLIC BLOOD PRESSURE: 134 MMHG | WEIGHT: 235 LBS | BODY MASS INDEX: 31.83 KG/M2 | DIASTOLIC BLOOD PRESSURE: 76 MMHG | HEIGHT: 72 IN | OXYGEN SATURATION: 97 %

## 2020-08-20 PROCEDURE — 93000 ELECTROCARDIOGRAM COMPLETE: CPT

## 2020-08-20 PROCEDURE — 99214 OFFICE O/P EST MOD 30 MIN: CPT

## 2020-08-20 RX ORDER — OXYCODONE AND ACETAMINOPHEN 5; 325 MG/1; MG/1
5-325 TABLET ORAL
Qty: 24 | Refills: 0 | Status: DISCONTINUED | COMMUNITY
Start: 2020-07-17 | End: 2020-08-20

## 2020-08-20 NOTE — PHYSICAL EXAM
[Well Groomed] : well groomed [General Appearance - Well Developed] : well developed [Normal Appearance] : normal appearance [General Appearance - Well Nourished] : well nourished [General Appearance - In No Acute Distress] : no acute distress [No Deformities] : no deformities [Normal Conjunctiva] : the conjunctiva exhibited no abnormalities [] : no respiratory distress [Respiration, Rhythm And Depth] : normal respiratory rhythm and effort [Auscultation Breath Sounds / Voice Sounds] : lungs were clear to auscultation bilaterally [Exaggerated Use Of Accessory Muscles For Inspiration] : no accessory muscle use [Heart Rate And Rhythm] : heart rate and rhythm were normal [Heart Sounds] : normal S1 and S2 [Abnormal Walk] : normal gait [Abdomen Soft] : soft [Skin Color & Pigmentation] : normal skin color and pigmentation [Oriented To Time, Place, And Person] : oriented to person, place, and time [FreeTextEntry1] : No LE Edema

## 2020-08-20 NOTE — DISCUSSION/SUMMARY
[FreeTextEntry1] : Left Ureteral stone  urology Follow up \par \par CAD: No active acardiac complaints NSTEMI/JAH placement in setting of mastocytosis: Continue Xarelto and plavix followed with hematology.  Carotid US/Abd Aorta ordered.  Reports he is under care of Vascular surgeon due to PVD reports he is scheduled for  MARK/Doppler studies\par \par HTN: Controlled \par \par HLD: Continue statin labs ordered \par \par Repeat CBC ( Mild anemia ) \par \par OV 2 months  as scheduled

## 2020-08-20 NOTE — HISTORY OF PRESENT ILLNESS
[FreeTextEntry1] : 71 Y/O gentleman PMH: CAD/NSTEMI/JAH RCA stent, MV repair, HTN, HLD here today S/P hospital discharge 2/2 left Ureteral stone

## 2020-08-20 NOTE — REASON FOR VISIT
[Follow-Up - From Hospitalization] : follow-up of a recent hospitalization for [Coronary Artery Disease] : coronary artery disease [Hyperlipidemia] : hyperlipidemia [Hypertension] : hypertension [Medication Management] : Medication management [FreeTextEntry1] : Left ureteral stone

## 2020-08-24 ENCOUNTER — APPOINTMENT (OUTPATIENT)
Dept: UROLOGY | Facility: CLINIC | Age: 70
End: 2020-08-24
Payer: MEDICARE

## 2020-08-24 PROCEDURE — 51741 ELECTRO-UROFLOWMETRY FIRST: CPT

## 2020-08-24 PROCEDURE — 51798 US URINE CAPACITY MEASURE: CPT | Mod: 59

## 2020-08-24 PROCEDURE — 99213 OFFICE O/P EST LOW 20 MIN: CPT | Mod: 25

## 2020-08-24 NOTE — HISTORY OF PRESENT ILLNESS
[FreeTextEntry1] : This patient has history of prostatism and kidney stones. He recently had a cystoscopy stent removal after which he was having some dysuria and dibbling. He was placed on antibiotic and feels much better now however he is still retaining some urine. His post void residual is 270 cc today.

## 2020-08-24 NOTE — PHYSICAL EXAM
[General Appearance - Well Developed] : well developed [General Appearance - Well Nourished] : well nourished [Normal Appearance] : normal appearance [Well Groomed] : well groomed [Abdomen Soft] : soft [General Appearance - In No Acute Distress] : no acute distress [Abdomen Tenderness] : non-tender [Urinary Bladder Findings] : the bladder was normal on palpation [Costovertebral Angle Tenderness] : no ~M costovertebral angle tenderness [Edema] : no peripheral edema [] : no respiratory distress [Respiration, Rhythm And Depth] : normal respiratory rhythm and effort [Oriented To Time, Place, And Person] : oriented to person, place, and time [Exaggerated Use Of Accessory Muscles For Inspiration] : no accessory muscle use [Affect] : the affect was normal [Mood] : the mood was normal [Not Anxious] : not anxious [Normal Station and Gait] : the gait and station were normal for the patient's age [No Focal Deficits] : no focal deficits [No Palpable Adenopathy] : no palpable adenopathy

## 2020-08-24 NOTE — ASSESSMENT
[FreeTextEntry1] : This patient presents with prostatism. Labs are ordered. He will continue his Tamsulosin and Finasteride. Bethanechol will be called in to his pharmacy. He will return for a flow study in three weeks.

## 2020-08-25 ENCOUNTER — RX CHANGE (OUTPATIENT)
Age: 70
End: 2020-08-25

## 2020-08-25 LAB
APPEARANCE: CLEAR
BACTERIA: ABNORMAL
BILIRUBIN URINE: NEGATIVE
BLOOD URINE: ABNORMAL
COLOR: YELLOW
GLUCOSE QUALITATIVE U: NEGATIVE
HYALINE CASTS: 0 /LPF
KETONES URINE: NEGATIVE
LEUKOCYTE ESTERASE URINE: ABNORMAL
MICROSCOPIC-UA: NORMAL
NITRITE URINE: POSITIVE
PH URINE: 8
PROTEIN URINE: ABNORMAL
RED BLOOD CELLS URINE: 200 /HPF
SPECIFIC GRAVITY URINE: 1.03
SQUAMOUS EPITHELIAL CELLS: 0 /HPF
TRIPLE PHOSPHATE CRYSTALS: ABNORMAL
UROBILINOGEN URINE: NORMAL
WHITE BLOOD CELLS URINE: 202 /HPF

## 2020-08-27 ENCOUNTER — RX CHANGE (OUTPATIENT)
Age: 70
End: 2020-08-27

## 2020-08-29 LAB — BACTERIA UR CULT: ABNORMAL

## 2020-09-14 ENCOUNTER — APPOINTMENT (OUTPATIENT)
Dept: VASCULAR SURGERY | Facility: CLINIC | Age: 70
End: 2020-09-14
Payer: MEDICARE

## 2020-09-14 VITALS
TEMPERATURE: 98.4 F | OXYGEN SATURATION: 97 % | HEART RATE: 69 BPM | HEIGHT: 73 IN | BODY MASS INDEX: 29.16 KG/M2 | WEIGHT: 220 LBS | DIASTOLIC BLOOD PRESSURE: 86 MMHG | SYSTOLIC BLOOD PRESSURE: 168 MMHG

## 2020-09-14 PROCEDURE — 93970 EXTREMITY STUDY: CPT

## 2020-09-14 PROCEDURE — 99203 OFFICE O/P NEW LOW 30 MIN: CPT

## 2020-09-14 NOTE — PHYSICAL EXAM
[Normal Breath Sounds] : Normal breath sounds [Normal Heart Sounds] : normal heart sounds [Ankle Swelling (On Exam)] : present [2+] : left 2+ [Varicose Veins Of Lower Extremities] : present [Varicose Veins Of The Left Leg] : of the left leg [Ankle Swelling On The Left] : moderate [] : of the left leg [No Rash or Lesion] : No rash or lesion [Alert] : alert [Oriented to Person] : oriented to person [Oriented to Time] : oriented to time [Calm] : calm [Oriented to Place] : oriented to place [JVD] : no jugular venous distention  [Abdomen Masses] : No abdominal masses [Abdomen Tenderness] : ~T ~M No abdominal tenderness [de-identified] : Well appearing [de-identified] : NCAT [FreeTextEntry1] : Left leg edema and venous stasis dermatitis. No ulceration

## 2020-09-14 NOTE — HISTORY OF PRESENT ILLNESS
[FreeTextEntry1] : 70 year old male presents for evaluation of left leg swelling and discoloration. Symptoms have worsened over the last year. He complains of dull aching with standing or sitting, heaviness in his left leg. He has ankle swelling that worsens through the day.\par He regularly wears compression stockings with some relief\par No history of DVT.\par He denies claudication, rest pain or leg ulcers.\par Patient has had previous vein stripping in his left leg many years ago for venous insufficiency.\par History also significant for CAD and recent NSTEMI for which he underwent PCI in May 2020\par \par

## 2020-09-14 NOTE — ASSESSMENT
[FreeTextEntry1] : 70 year old male with symptomatic left leg venous insufficiency.\par He has strongly palpable pedal pulses and no evidence of lower extremity arterial insufficiency.\par Left leg venous duplex performed today shows deep venous reflux in the popliteal vein and superficial venous reflux in the small saphenous vein (SSV). THere is no DVT\par - I have discussed these findings with the patient. I recommend a trial of knee high compression stockings, exercise and extremity elevation. 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 I explained to him that it is difficult to predict the degree of symptom improvement he will have after RF ablation of his SSV because he also has deep venous reflux.\par All questions answered. He will return in 3 months.

## 2020-09-15 ENCOUNTER — RX CHANGE (OUTPATIENT)
Age: 70
End: 2020-09-15

## 2020-09-15 ENCOUNTER — APPOINTMENT (OUTPATIENT)
Dept: UROLOGY | Facility: CLINIC | Age: 70
End: 2020-09-15
Payer: MEDICARE

## 2020-09-15 PROCEDURE — 51798 US URINE CAPACITY MEASURE: CPT | Mod: 59

## 2020-09-15 PROCEDURE — 51741 ELECTRO-UROFLOWMETRY FIRST: CPT

## 2020-09-15 PROCEDURE — 99213 OFFICE O/P EST LOW 20 MIN: CPT | Mod: 25

## 2020-09-15 RX ORDER — BETHANECHOL CHLORIDE 50 MG/1
50 TABLET ORAL
Qty: 60 | Refills: 0 | Status: DISCONTINUED | COMMUNITY
Start: 2020-08-24 | End: 2020-09-15

## 2020-09-15 NOTE — ASSESSMENT
[FreeTextEntry1] : Urinary urgency which could be related to the bethanechol or possibly a prostatitis his erectile dysfunction will be solved with oral medications

## 2020-09-15 NOTE — HISTORY OF PRESENT ILLNESS
[FreeTextEntry1] : This patient is here today for a flow study and postvoid    the patient is also having some urinary urgency and he notes erectile dysfunction unresponsive to oral agents

## 2020-09-15 NOTE — END OF VISIT
[FreeTextEntry3] : He will stop the bethanechol and take a course of antibiotics.  We will follow-up here with a duplex scan of the penis

## 2020-09-16 ENCOUNTER — RX RENEWAL (OUTPATIENT)
Age: 70
End: 2020-09-16

## 2020-09-28 ENCOUNTER — APPOINTMENT (OUTPATIENT)
Dept: UROLOGY | Facility: CLINIC | Age: 70
End: 2020-09-28

## 2020-09-28 VITALS
OXYGEN SATURATION: 96 % | HEIGHT: 72 IN | BODY MASS INDEX: 29.8 KG/M2 | DIASTOLIC BLOOD PRESSURE: 95 MMHG | WEIGHT: 220 LBS | HEART RATE: 80 BPM | SYSTOLIC BLOOD PRESSURE: 179 MMHG

## 2020-09-28 RX ORDER — RIVAROXABAN 15 MG/1
15 TABLET, FILM COATED ORAL DAILY
Refills: 0 | Status: COMPLETED | COMMUNITY
End: 2020-09-28

## 2020-10-07 ENCOUNTER — APPOINTMENT (OUTPATIENT)
Dept: CARDIOLOGY | Facility: CLINIC | Age: 70
End: 2020-10-07

## 2020-10-09 ENCOUNTER — RX RENEWAL (OUTPATIENT)
Age: 70
End: 2020-10-09

## 2020-10-13 ENCOUNTER — APPOINTMENT (OUTPATIENT)
Dept: UROLOGY | Facility: CLINIC | Age: 70
End: 2020-10-13

## 2020-10-20 ENCOUNTER — APPOINTMENT (OUTPATIENT)
Dept: CARDIOLOGY | Facility: CLINIC | Age: 70
End: 2020-10-20
Payer: MEDICARE

## 2020-10-20 ENCOUNTER — NON-APPOINTMENT (OUTPATIENT)
Age: 70
End: 2020-10-20

## 2020-10-20 VITALS
DIASTOLIC BLOOD PRESSURE: 85 MMHG | SYSTOLIC BLOOD PRESSURE: 132 MMHG | BODY MASS INDEX: 31.69 KG/M2 | HEART RATE: 69 BPM | HEIGHT: 72 IN | OXYGEN SATURATION: 96 % | WEIGHT: 234 LBS

## 2020-10-20 DIAGNOSIS — Z87.438 PERSONAL HISTORY OF OTHER DISEASES OF MALE GENITAL ORGANS: ICD-10-CM

## 2020-10-20 DIAGNOSIS — N99.89 OTHER POSTPROCEDURAL COMPLICATIONS AND DISORDERS OF GENITOURINARY SYSTEM: ICD-10-CM

## 2020-10-20 DIAGNOSIS — R33.8 OTHER POSTPROCEDURAL COMPLICATIONS AND DISORDERS OF GENITOURINARY SYSTEM: ICD-10-CM

## 2020-10-20 DIAGNOSIS — Z87.898 PERSONAL HISTORY OF OTHER SPECIFIED CONDITIONS: ICD-10-CM

## 2020-10-20 DIAGNOSIS — Z86.03 PERSONAL HISTORY OF NEOPLASM OF UNCERTAIN BEHAVIOR: ICD-10-CM

## 2020-10-20 DIAGNOSIS — Z88.8 ALLERGY STATUS TO OTHER DRUGS, MEDICAMENTS AND BIOLOGICAL SUBSTANCES: ICD-10-CM

## 2020-10-20 PROCEDURE — 99214 OFFICE O/P EST MOD 30 MIN: CPT | Mod: 25

## 2020-10-20 PROCEDURE — 99072 ADDL SUPL MATRL&STAF TM PHE: CPT

## 2020-10-20 PROCEDURE — 93000 ELECTROCARDIOGRAM COMPLETE: CPT

## 2020-10-20 RX ORDER — TAMSULOSIN HYDROCHLORIDE 0.4 MG/1
0.4 CAPSULE ORAL
Qty: 60 | Refills: 2 | Status: DISCONTINUED | COMMUNITY
Start: 2020-10-09 | End: 2020-10-20

## 2020-10-20 RX ORDER — DOXYCYCLINE 100 MG/1
100 TABLET, FILM COATED ORAL
Qty: 20 | Refills: 0 | Status: DISCONTINUED | COMMUNITY
Start: 2020-09-15 | End: 2020-10-20

## 2020-10-20 RX ORDER — BETHANECHOL CHLORIDE 50 MG/1
50 TABLET ORAL
Qty: 180 | Refills: 1 | Status: DISCONTINUED | COMMUNITY
Start: 2020-09-15 | End: 2020-10-20

## 2020-10-20 RX ORDER — CEFUROXIME AXETIL 500 MG/1
500 TABLET ORAL
Qty: 20 | Refills: 0 | Status: DISCONTINUED | COMMUNITY
Start: 2020-10-09 | End: 2020-10-20

## 2020-10-20 NOTE — DISCUSSION/SUMMARY
[FreeTextEntry1] : NSTEMI/JAH in RCA/?aspirin allergy versus mastocytosis: Now on Pletal and lavix and doing well.  Bruising better than when he was on Xarelto.  No CP or SOB.     \par HL: Blood work prior to next visit. Low fat diet, exercise and weight loss discussed. \par F/u in 3 months.

## 2020-10-20 NOTE — PHYSICAL EXAM
[General Appearance - Well Developed] : well developed [General Appearance - Well Nourished] : well nourished [Normal Conjunctiva] : the conjunctiva exhibited no abnormalities [Normal Oral Mucosa] : normal oral mucosa [Normal Jugular Venous V Waves Present] : normal jugular venous V waves present [Auscultation Breath Sounds / Voice Sounds] : lungs were clear to auscultation bilaterally [Bowel Sounds] : normal bowel sounds [Abdomen Soft] : soft [Abnormal Walk] : normal gait [Nail Clubbing] : no clubbing of the fingernails [Cyanosis, Localized] : no localized cyanosis [] : no rash [Oriented To Time, Place, And Person] : oriented to person, place, and time [No Anxiety] : not feeling anxious [5th Left ICS - MCL] : palpated at the 5th LICS in the midclavicular line [Normal S1] : normal S1 [Rhythm Regular] : regular [Normal S2] : normal S2 [No Murmur] : no murmurs heard [Right Carotid Bruit] : no bruit heard over the right carotid [Left Carotid Bruit] : no bruit heard over the left carotid [No Pitting Edema] : no pitting edema present [2+] : left 2+

## 2020-10-20 NOTE — HISTORY OF PRESENT ILLNESS
[Chest Pain Starting When At Rest] : denies chest pain at rest [Chest Pain Which Starts With Exertion] : denies exertional chest pain [Difficulty Breathing (Dyspnea)] : denies dyspnea [Feeling Tired (Fatigue)] : denies fatigue [Feelings Of Weakness On Exertion] : denies reduced exercise tolerance [Palpitations] : denies palpitations [Regional Soft Tissue Swelling Both Lower Extremities] : denies edema [Difficulty Breathing Only When Lying Down (Supine Dyspnea)] : denies orthopnea [Leg Pain With Exercise (Leg Claudication)] : denies claudication [Dizziness] : denies dizziness [Dizziness Upon Standing Up] : denies orthostatic dizziness [Stable] : weight has been stable [Adherent] : the patient is adherent with ~his/her~ medication regimen [de-identified] : Bruising much better with change in medication. Gained weight and discussed working on this.  has been exercising but inconsistently.

## 2020-10-21 LAB
25(OH)D3 SERPL-MCNC: 39.1 NG/ML
ALBUMIN SERPL ELPH-MCNC: 4.5 G/DL
ALP BLD-CCNC: 72 U/L
ALT SERPL-CCNC: 17 U/L
ANION GAP SERPL CALC-SCNC: 14 MMOL/L
AST SERPL-CCNC: 16 U/L
BASOPHILS # BLD AUTO: 0.03 K/UL
BASOPHILS NFR BLD AUTO: 0.4 %
BILIRUB SERPL-MCNC: 0.7 MG/DL
BUN SERPL-MCNC: 15 MG/DL
CALCIUM SERPL-MCNC: 9.6 MG/DL
CHLORIDE SERPL-SCNC: 102 MMOL/L
CHOLEST SERPL-MCNC: 212 MG/DL
CK SERPL-CCNC: 105 U/L
CO2 SERPL-SCNC: 22 MMOL/L
CREAT SERPL-MCNC: 0.85 MG/DL
EOSINOPHIL # BLD AUTO: 0.21 K/UL
EOSINOPHIL NFR BLD AUTO: 3.1 %
ESTIMATED AVERAGE GLUCOSE: 108 MG/DL
GLUCOSE SERPL-MCNC: 116 MG/DL
HBA1C MFR BLD HPLC: 5.4 %
HCT VFR BLD CALC: 43.6 %
HDLC SERPL-MCNC: 46 MG/DL
HGB BLD-MCNC: 14.3 G/DL
IMM GRANULOCYTES NFR BLD AUTO: 0.3 %
LDLC SERPL CALC-MCNC: 139 MG/DL
LDLC SERPL DIRECT ASSAY-MCNC: 145 MG/DL
LYMPHOCYTES # BLD AUTO: 1.65 K/UL
LYMPHOCYTES NFR BLD AUTO: 24.7 %
MAN DIFF?: NORMAL
MCHC RBC-ENTMCNC: 30.8 PG
MCHC RBC-ENTMCNC: 32.8 GM/DL
MCV RBC AUTO: 93.8 FL
MONOCYTES # BLD AUTO: 0.53 K/UL
MONOCYTES NFR BLD AUTO: 7.9 %
NEUTROPHILS # BLD AUTO: 4.23 K/UL
NEUTROPHILS NFR BLD AUTO: 63.6 %
NONHDLC SERPL-MCNC: 167 MG/DL
PLATELET # BLD AUTO: 180 K/UL
POTASSIUM SERPL-SCNC: 3.8 MMOL/L
PROT SERPL-MCNC: 6.8 G/DL
RBC # BLD: 4.65 M/UL
RBC # FLD: 13.2 %
SODIUM SERPL-SCNC: 139 MMOL/L
TRIGL SERPL-MCNC: 138 MG/DL
TSH SERPL-ACNC: 1.54 UIU/ML
WBC # FLD AUTO: 6.67 K/UL

## 2020-11-02 ENCOUNTER — RX RENEWAL (OUTPATIENT)
Age: 70
End: 2020-11-02

## 2020-12-01 ENCOUNTER — NON-APPOINTMENT (OUTPATIENT)
Age: 70
End: 2020-12-01

## 2020-12-03 ENCOUNTER — APPOINTMENT (OUTPATIENT)
Dept: UROLOGY | Facility: CLINIC | Age: 70
End: 2020-12-03
Payer: MEDICARE

## 2020-12-03 PROCEDURE — 99072 ADDL SUPL MATRL&STAF TM PHE: CPT

## 2020-12-03 PROCEDURE — 51798 US URINE CAPACITY MEASURE: CPT

## 2020-12-03 PROCEDURE — 99213 OFFICE O/P EST LOW 20 MIN: CPT | Mod: 25

## 2020-12-03 NOTE — HISTORY OF PRESENT ILLNESS
[FreeTextEntry1] : Patient complains of increased urinary frequency and urgency.  He is on prostate related medications at this time and his urinary residual today was minimal.

## 2020-12-09 LAB
APPEARANCE: CLEAR
BACTERIA: NEGATIVE
BILIRUBIN URINE: NEGATIVE
BLOOD URINE: NEGATIVE
COLOR: YELLOW
GLUCOSE QUALITATIVE U: NEGATIVE
HYALINE CASTS: 0 /LPF
KETONES URINE: NEGATIVE
LEUKOCYTE ESTERASE URINE: NEGATIVE
MICROSCOPIC-UA: NORMAL
NITRITE URINE: NEGATIVE
PH URINE: 6.5
PROTEIN URINE: NORMAL
PSA SERPL-MCNC: 3.35 NG/ML
RED BLOOD CELLS URINE: 2 /HPF
SPECIFIC GRAVITY URINE: 1.03
SQUAMOUS EPITHELIAL CELLS: 0 /HPF
UROBILINOGEN URINE: NORMAL
WHITE BLOOD CELLS URINE: 1 /HPF

## 2020-12-11 LAB — URINE CYTOLOGY: NORMAL

## 2020-12-14 ENCOUNTER — RX RENEWAL (OUTPATIENT)
Age: 70
End: 2020-12-14

## 2020-12-21 ENCOUNTER — APPOINTMENT (OUTPATIENT)
Dept: UROLOGY | Facility: CLINIC | Age: 70
End: 2020-12-21

## 2020-12-21 ENCOUNTER — APPOINTMENT (OUTPATIENT)
Dept: VASCULAR SURGERY | Facility: CLINIC | Age: 70
End: 2020-12-21

## 2021-01-25 RX ORDER — CILOSTAZOL 100 MG/1
100 TABLET ORAL TWICE DAILY
Qty: 62 | Refills: 0 | Status: DISCONTINUED | COMMUNITY
Start: 2021-01-25 | End: 2021-01-25

## 2021-01-25 RX ORDER — CILOSTAZOL 50 MG/1
50 TABLET ORAL
Qty: 180 | Refills: 3 | Status: DISCONTINUED | COMMUNITY
Start: 2020-09-28 | End: 2021-01-25

## 2021-01-26 ENCOUNTER — APPOINTMENT (OUTPATIENT)
Dept: CARDIOLOGY | Facility: CLINIC | Age: 71
End: 2021-01-26

## 2021-02-05 ENCOUNTER — NON-APPOINTMENT (OUTPATIENT)
Age: 71
End: 2021-02-05

## 2021-02-08 ENCOUNTER — APPOINTMENT (OUTPATIENT)
Dept: VASCULAR SURGERY | Facility: CLINIC | Age: 71
End: 2021-02-08

## 2021-02-17 ENCOUNTER — NON-APPOINTMENT (OUTPATIENT)
Age: 71
End: 2021-02-17

## 2021-02-17 ENCOUNTER — APPOINTMENT (OUTPATIENT)
Dept: CARDIOLOGY | Facility: CLINIC | Age: 71
End: 2021-02-17
Payer: MEDICARE

## 2021-02-17 VITALS
WEIGHT: 234 LBS | RESPIRATION RATE: 16 BRPM | OXYGEN SATURATION: 96 % | DIASTOLIC BLOOD PRESSURE: 85 MMHG | HEIGHT: 72 IN | BODY MASS INDEX: 31.69 KG/M2 | SYSTOLIC BLOOD PRESSURE: 154 MMHG | HEART RATE: 65 BPM

## 2021-02-17 PROCEDURE — 93000 ELECTROCARDIOGRAM COMPLETE: CPT

## 2021-02-17 PROCEDURE — 99072 ADDL SUPL MATRL&STAF TM PHE: CPT

## 2021-02-17 PROCEDURE — 99215 OFFICE O/P EST HI 40 MIN: CPT

## 2021-02-17 RX ORDER — CILOSTAZOL 50 MG/1
50 TABLET ORAL
Qty: 180 | Refills: 3 | Status: DISCONTINUED | COMMUNITY
Start: 2021-01-25 | End: 2021-02-17

## 2021-02-21 NOTE — PHYSICAL EXAM
[General Appearance - Well Developed] : well developed [Normal Appearance] : normal appearance [Well Groomed] : well groomed [General Appearance - Well Nourished] : well nourished [No Deformities] : no deformities [General Appearance - In No Acute Distress] : no acute distress [Normal Conjunctiva] : the conjunctiva exhibited no abnormalities [Eyelids - No Xanthelasma] : the eyelids demonstrated no xanthelasmas [Normal Oral Mucosa] : normal oral mucosa [No Oral Pallor] : no oral pallor [No Oral Cyanosis] : no oral cyanosis [Normal Jugular Venous A Waves Present] : normal jugular venous A waves present [Normal Jugular Venous V Waves Present] : normal jugular venous V waves present [No Jugular Venous Lombardi A Waves] : no jugular venous lombardi A waves [Respiration, Rhythm And Depth] : normal respiratory rhythm and effort [Exaggerated Use Of Accessory Muscles For Inspiration] : no accessory muscle use [Auscultation Breath Sounds / Voice Sounds] : lungs were clear to auscultation bilaterally [Heart Rate And Rhythm] : heart rate and rhythm were normal [Heart Sounds] : normal S1 and S2 [Murmurs] : no murmurs present [Abdomen Soft] : soft [Abdomen Tenderness] : non-tender [Abdomen Mass (___ Cm)] : no abdominal mass palpated [Abnormal Walk] : normal gait [Gait - Sufficient For Exercise Testing] : the gait was sufficient for exercise testing [Nail Clubbing] : no clubbing of the fingernails [Cyanosis, Localized] : no localized cyanosis [Petechial Hemorrhages (___cm)] : no petechial hemorrhages [Skin Color & Pigmentation] : normal skin color and pigmentation [] : no rash [No Venous Stasis] : no venous stasis [Skin Lesions] : no skin lesions [No Skin Ulcers] : no skin ulcer [No Xanthoma] : no  xanthoma was observed [Oriented To Time, Place, And Person] : oriented to person, place, and time [Affect] : the affect was normal [Mood] : the mood was normal [No Anxiety] : not feeling anxious

## 2021-03-31 ENCOUNTER — APPOINTMENT (OUTPATIENT)
Dept: CARDIOLOGY | Facility: CLINIC | Age: 71
End: 2021-03-31

## 2021-05-14 LAB
CHOLEST SERPL-MCNC: 180 MG/DL
HDLC SERPL-MCNC: 50 MG/DL
LDLC SERPL CALC-MCNC: 106 MG/DL
NONHDLC SERPL-MCNC: 130 MG/DL
TRIGL SERPL-MCNC: 121 MG/DL

## 2021-05-25 ENCOUNTER — RX RENEWAL (OUTPATIENT)
Age: 71
End: 2021-05-25

## 2021-06-09 ENCOUNTER — NON-APPOINTMENT (OUTPATIENT)
Age: 71
End: 2021-06-09

## 2021-06-09 ENCOUNTER — APPOINTMENT (OUTPATIENT)
Dept: CARDIOLOGY | Facility: CLINIC | Age: 71
End: 2021-06-09
Payer: MEDICARE

## 2021-06-09 PROCEDURE — 93015 CV STRESS TEST SUPVJ I&R: CPT

## 2021-06-14 ENCOUNTER — APPOINTMENT (OUTPATIENT)
Dept: VASCULAR SURGERY | Facility: CLINIC | Age: 71
End: 2021-06-14
Payer: MEDICARE

## 2021-06-14 PROCEDURE — 36475 ENDOVENOUS RF 1ST VEIN: CPT | Mod: LT

## 2021-06-16 ENCOUNTER — APPOINTMENT (OUTPATIENT)
Dept: UROLOGY | Facility: CLINIC | Age: 71
End: 2021-06-16
Payer: MEDICARE

## 2021-06-16 PROCEDURE — 99213 OFFICE O/P EST LOW 20 MIN: CPT

## 2021-06-16 NOTE — HISTORY OF PRESENT ILLNESS
[FreeTextEntry1] : This patient presents today for a checkup but is primarily concerned with erectile dysfunction.  He is unable to get suitable erections at this time

## 2021-06-17 ENCOUNTER — APPOINTMENT (OUTPATIENT)
Dept: VASCULAR SURGERY | Facility: CLINIC | Age: 71
End: 2021-06-17
Payer: MEDICARE

## 2021-06-17 PROCEDURE — 93971 EXTREMITY STUDY: CPT

## 2021-06-18 ENCOUNTER — APPOINTMENT (OUTPATIENT)
Dept: CARDIOLOGY | Facility: CLINIC | Age: 71
End: 2021-06-18

## 2021-07-02 ENCOUNTER — APPOINTMENT (OUTPATIENT)
Dept: UROLOGY | Facility: CLINIC | Age: 71
End: 2021-07-02

## 2021-07-08 NOTE — H&P PST ADULT - MEDICATION ADMINISTRATION INFO, PROFILE
[facility]        Progress Note    Patient Name: Keila Phelps  MRN: [id number]  YOB: 1970  Date of Evaluation: 2021    Chief Complaint: Abdominal pain    Subjective:  No acute events overnight  Pain continues to improve  Tolerated clear liquids but does not like taste  Passing stool    Vital Signs:  Patient Vitals for the past 24 hrs:   BP Temp Temp src Pulse Resp SpO2   21 0845 132/89 98.5 °F (36.9 °C) Oral 80 18 97 %   21 0509 (!) 143/98 97.7 °F (36.5 °C) Oral -- 20 97 %   21 0120 (!) 150/102 -- -- -- -- --   21 2234 (!) 146/100 98.6 °F (37 °C) Oral 79 18 95 %   21 127/83 98.5 °F (36.9 °C) Oral 85 20 98 %   21 1905 (!) 166/124 -- -- 109 -- --   21 1900 (!) 171/113 -- -- 97 -- --   21 1855 (!) 152/112 -- -- 84 -- --   21 1850 (!) 168/130 -- -- 93 -- --   21 1845 (!) 149/107 -- -- -- -- --   21 1814 (!) 150/110 -- -- -- -- --   21 1743 (!) 147/107 98.8 °F (37.1 °C) Oral 84 20 96 %   21 1117 (!) 148/94 98.2 °F (36.8 °C) Oral 87 19 98 %   21 1044 -- 97.7 °F (36.5 °C) Oral -- -- --      TEMPERATURE HISTORY 24H: Temp (24hrs), Av.3 °F (36.8 °C), Min:97.7 °F (36.5 °C), Max:98.8 °F (37.1 °C)    BLOOD PRESSURE HISTORY: Systolic (66XNG), NQV:345 , Min:112 , EJI:365    Diastolic (06IXD), CHT:427, Min:76, Max:130      Intake/Output:  I/O last 3 completed shifts:   In: 840 [P.O.:840]  Out: 750 [Urine:750]  I/O this shift:  In: 240 [P.O.:240]  Out: -   Drain/tube Output:       Physical Exam:  General: awake, alert, oriented to  person, place, time  Cardiovascular:  regular rate and rhythm and no murmur noted  Lungs: clear to auscultation  Abdomen: soft, non-distended, mild left lower quadrant tenderness without peritonitis    Labs:  CBC:    Recent Labs     21  0613 21  0558 21  0530   WBC 8.2 6.9 6.5   HGB 15.5 14.5 14.8   HCT 45.2 42.5 41.7    364 371     BMP:    Recent Labs     07/06/21  0613 07/07/21  0558 07/08/21  0530    140 139   K 3.6 3.5 3.1*    104 100   CO2 23 26 28   BUN 20 18 11   CREATININE 2.7* 1.6* 1.0   GLUCOSE 113* 84 106*     Hepatic:    Recent Labs     07/05/21  1245 07/05/21  2345 07/06/21  0613   AST 23 21 20   ALT 24 19 17   BILITOT 0.6 0.6 0.6   ALKPHOS 75 67 64     Amylase:  No results found for: AMYLASE  Lipase:    Lab Results   Component Value Date    LIPASE 16.0 07/05/2021    LIPASE 20.0 07/05/2021    LIPASE 35.0 07/09/2016      Mag:    Lab Results   Component Value Date    MG 2.00 07/06/2021     Phos:     Lab Results   Component Value Date    PHOS 4.6 07/06/2021      Coags:   Lab Results   Component Value Date    PROTIME 15.3 07/06/2021    INR 1.34 07/06/2021    APTT 35.7 07/06/2021       Cultures:  Anaerobic culture  No results found for: LABANAE  Fungus stain  No results found for requested labs within last 30 days. Gram stain  No results found for requested labs within last 30 days. Organism  No results found for: [facility]  Surgical culture  No results found for: CXSURG  Blood culture 1  No results found for requested labs within last 30 days. Blood culture 2  No results found for requested labs within last 30 days. Fecal occult  No results found for requested labs within last 30 days. GI bacterial pathogens by PCR  No results found for requested labs within last 30 days. C. difficile  No results found for requested labs within last 30 days. Urine culture  Lab Results   Component Value Date    LABURIN  07/09/2016     <50,000 CFU/ml mixed skin/urogenital muna.  No further workup       Pathology:  No relevant pathology     Imaging:  I have personally reviewed the following films:    CT ABDOMEN PELVIS WO CONTRAST Additional Contrast? None    Result Date: 7/6/2021  EXAMINATION: CT OF THE ABDOMEN AND PELVIS WITHOUT CONTRAST 7/5/2021 11:33 pm TECHNIQUE: CT of the abdomen and pelvis was performed without the administration of intravenous contrast. Multiplanar reformatted images are provided for review. Dose modulation, iterative reconstruction, and/or weight based adjustment of the mA/kV was utilized to reduce the radiation dose to as low as reasonably achievable. COMPARISON: 07/05/2021 HISTORY: ORDERING SYSTEM PROVIDED HISTORY: worsening abdominal pain, 10/10,  earlier scan concern for perforation vs ischemia TECHNOLOGIST PROVIDED HISTORY: Reason for exam:->worsening abdominal pain, 10/10,  earlier scan concern for perforation vs ischemia Additional Contrast?->None Is the patient pregnant?->No Reason for Exam: worsening abdominal pain, 10/10,  earlier scan concern for perforation vs ischemia Acuity: Acute Type of Exam: Initial Relevant Medical/Surgical History: Abdominal Pain (brought in via family upper left quad pain; hx of diverticulitis) FINDINGS: Lower Chest: Unremarkable. Organs: Liver is normal in contour and attenuation. Gallbladder is unremarkable without biliary ductal dilatation. Pancreas is unremarkable. Adrenals are unremarkable. Spleen is normal in size. Diffuse hypoattenuation of the renal parenchyma. No hydronephrosis. Mild calcific atherosclerosis. Previously demonstrated portal venous gas is no longer seen. GI/Bowel: Left colonic diverticulosis with similar long segment of sigmoid colon demonstrating mild wall thickening and adjacent inflammatory change. Few locules of extraluminal gas are again seen in the sigmoid mesocolon. Monroe Nehemias Appendix is normal. Pelvis: Fibroid uterus. Peritoneum/Retroperitoneum:No free fluid, free air, organized fluid collection or lymphadenopathy. Bones: Unremarkable. 1. Sigmoid diverticulitis with perforation is again demonstrated. The previously demonstrated portal venous gas is no longer seen. 2. Diffuse hypoattenuation of the renal parenchyma without hydronephrosis.  Finding is nonspecific by noncontrast technique but may reflect diffuse renal edema/injury. Clinical correlation is advised. CT ABDOMEN PELVIS WO CONTRAST Additional Contrast? None    Result Date: 7/5/2021  EXAMINATION: CT OF THE ABDOMEN AND PELVIS WITHOUT CONTRAST 7/5/2021 2:24 pm TECHNIQUE: CT of the abdomen and pelvis was performed without the administration of intravenous contrast. Multiplanar reformatted images are provided for review. Dose modulation, iterative reconstruction, and/or weight based adjustment of the mA/kV was utilized to reduce the radiation dose to as low as reasonably achievable. COMPARISON: None. HISTORY: ORDERING SYSTEM PROVIDED HISTORY: abd pain luq TECHNOLOGIST PROVIDED HISTORY: Reason for exam:->abd pain luq Additional Contrast?->None Is the patient pregnant?->No Reason for Exam: abd pain LUQ Acuity: Acute Type of Exam: Initial FINDINGS: Evaluation of the solid organs is limited due to lack of IV contrast. CARDIOVASCULAR: The heart is normal in size. There is no pericardial effusion. The abdominal aorta is normal in course and caliber. LUNG BASES: There are no basilar consolidations or pleural effusions. HEPATOBILIARY: The liver is normal in size. There are no focal hepatic lesions. There is no biliary ductal dilatation. The gallbladder is unremarkable. SPLEEN: Unremarkable. PANCREAS: Unremarkable. ADRENAL GLANDS: Unremarkable. KIDNEYS: The kidneys are normal in size and contour. There is no hydronephrosis or nephrolithiasis. ABDOMINAL NODES: No adenopathy is appreciated. PELVIC ORGANS: The urinary bladder is unremarkable. Multiple calcified uterine fibroid noted. PERITONEUM/MESENTERY/BOWEL: There is thickening of the sigmoid colon with pericolonic fat stranding consistent with sigmoid diverticulitis. Portal venous gas is noted. The stomach is unremarkable. There is no bowel obstruction. The appendix is normal. BONES/SOFT TISSUES: There is no acute osseous or soft tissue abnormality.      Limited evaluation due to lack of IV contrast.  Given these limitations, there is however, sigmoid diverticulitis with a small amount of portal venous gas noted. This likely reflects a small perforation. However, ischemic bowel cannot be excluded, clinical correlation with any evidence of bowel ischemia and increased lactate level is recommended. No evidence of a focal collection. Scheduled Meds:   amLODIPine  5 mg Oral Daily    [START ON 7/9/2021] enoxaparin  40 mg Subcutaneous Daily    nicotine  1 patch Transdermal Daily    sodium chloride  1,000 mL Intravenous Once    piperacillin-tazobactam  3,375 mg Intravenous Q8H    sodium chloride flush  5-40 mL Intravenous 2 times per day     Continuous Infusions:   lactated ringers 50 mL/hr at 07/08/21 0928    sodium chloride       PRN Meds:.potassium chloride **OR** potassium alternative oral replacement **OR** potassium chloride, ondansetron **OR** ondansetron, morphine, morphine, prochlorperazine, sodium chloride flush, sodium chloride, polyethylene glycol, acetaminophen **OR** acetaminophen      Assessment:  49 y.o. female admitted with   1. Diverticulitis of colon    2. Abnormal CT of the abdomen    3. Acute renal failure, unspecified acute renal failure type [facility])      Ms. Judy Mccartney is a 49 y.o. female who presents with   Acute microperforated sigmoid diverticulitis     Plan:  1. Abdominal exam benign and improving, vital signs stable, no signs of toxicity. Does not need surgical exploration unless condition deteriorates. If worsens, may need re-imaging to rule out complication of diverticulitis such as abscess or perforation  2. Advance to full liquids  3. Monitor bowel function, passing soft brown stool  4. Antibiotics, will switch to PO at discharge  5. Will need elective colonoscopy 6-8 weeks after discharge to screen for other etiologies that may be mimicking diverticulitis  6. Will discuss benefit of sigmoidectomy electively after discharge.  If necessary, best chances of successful minimally invasive resection without need for temporary ostomy are with elective procedure  7. Defer management of remainder of other medical conditions to primary and consulting teams  8. Discharge planning, possible tomorrow    Dionisio Grimaldo MD, FACS  7/8/2021  10:38 AM no concerns

## 2021-07-12 NOTE — ASSESSMENT
[FreeTextEntry1] : A/P:\par \par *NSTEMI-s/p JAH RCA May '20-ASA allergy: \par -cont. pletal, plavix, BB. \par -myalgias on lipitor,crestor, currently on pravastatin\par -LDL elevated Oct 20 at 139, zetia added today.\par -Check lipid profile prior to next visit.  \par -One episode of atypical discomfort.  Reassured pt,\par if symptoms persist will consider stress testing.\par \par *HTN\par -today's BP elevated,prior BPs WNL\par -cont. metoprolol, HCTZ 25 daily\par -consider BP diary (?)\par \par Return 1 month to review results of lipid panel.\par \par ============================\par \par 5/14/'21\par \par Mr. carey called to ask about cholesterol results.\par \par May-10-'21:  HDL 50 , \par \par Reviewed chol on admit in May '20\par May-18-'20:  HDL 39  \par \par Note that pt has not achieved a 50% reduction in LDL which in his case \par would be 73 despite zetia & pravastatin.\par \par May be a candidate for praluent or repatha\par as LDL reduction is <50%\par \par pt unsure if he will start PCSK9 inhibitor\par will think about it & likely try therapeutic lifestyle changes.\par \par Pt asked about continuing cilostazol & plavix.\par As it has been >1 yr & data on long term benefits of cilostazol is minimal asked him to stop cilostazol & continue plavix.\par \par Pt did not do cardiac rehab as prescribed b/c he\par was told he had to pay 30$ per session & had to go 3x a week\par advised him to call another center to see if cost is less\par & asked him to consider 1x a week.\par \par Offered exercise stress test as alternative to assess exercise capacity \par prior to resuming martial arts he agreed.\par \par He will call office to schedule exercise stress test then see me after.\par =================================\par 7/12/'21\par Pt called b/c was told exercise treadmill\par test was inconclusive for ischemia.\par \par Reviewed exercise treadmill.\par No symptoms 8 minutes duration.\par MPHR 85% 128, pt achieved peak HR of 136.\par At peak HR of 129 & 133 pt was narrow complex w/ no ischemic changes.\par In recovery period LBBB noted.\par \par This is adequate to r/o ischemia by my interpretation.\par \par Advised pt to resume exercise. \par Reccomended a few sessions of cardiac rehab\par prior to resuming martial arts.\par cardiac rehab contact #s provided - sent email.\par \par Pt asked about stopping antiplatelets\par I advised stopping cilostazol & contining plavix.\par \par Will evaluate pt Aug '21 after a few sessions of cardiac rehab - pt will bring paperwork from those sessions.

## 2021-07-12 NOTE — HISTORY OF PRESENT ILLNESS
[FreeTextEntry1] : 71 y/o w/\par \par *NSTEMI-s/p PCI RCA May '20\par *h/o ASA allergy v. mastocytosis?-unresponsive to ASA\par desensitization, on cilostazol \par \par Previously seen by Dr. coelho.\par during last visit doing well on pletal & plavix for JAH.\par Of note could not tolerate xarelto & plavix 2/2 bruising\par \par 2 days ago reports mild epigastric discomfort\par not related to exertion.  isolated episode resolved\par spontaneously.  No further episodes.\par \par Discussed in detail lifestyle changes.\par \par EK/10/20, NSR, LAE, Old inferior MI. \par Echo: 2020, LAE, MV repair with +1 MR, no pericardial effusion., normal LV function LVEF 55-60%. \par Cardiac Cath: 2020, 1 Vessel CAD with inferior hypokinesis. \par Stent: 2020, JAH to RCA \par \par

## 2021-08-27 ENCOUNTER — APPOINTMENT (OUTPATIENT)
Dept: CARDIOLOGY | Facility: CLINIC | Age: 71
End: 2021-08-27
Payer: MEDICARE

## 2021-08-27 ENCOUNTER — NON-APPOINTMENT (OUTPATIENT)
Age: 71
End: 2021-08-27

## 2021-08-27 VITALS
WEIGHT: 238 LBS | SYSTOLIC BLOOD PRESSURE: 124 MMHG | BODY MASS INDEX: 32.23 KG/M2 | HEART RATE: 65 BPM | HEIGHT: 72 IN | OXYGEN SATURATION: 96 % | DIASTOLIC BLOOD PRESSURE: 66 MMHG

## 2021-08-27 PROCEDURE — 93000 ELECTROCARDIOGRAM COMPLETE: CPT

## 2021-08-27 PROCEDURE — 99213 OFFICE O/P EST LOW 20 MIN: CPT

## 2021-08-27 RX ORDER — CILOSTAZOL 50 MG/1
50 TABLET ORAL
Qty: 180 | Refills: 3 | Status: DISCONTINUED | COMMUNITY
Start: 2021-01-26 | End: 2021-08-27

## 2021-08-27 RX ORDER — METOPROLOL TARTRATE 25 MG/1
25 TABLET, FILM COATED ORAL DAILY
Qty: 45 | Refills: 3 | Status: DISCONTINUED | COMMUNITY
Start: 1900-01-01 | End: 2021-08-27

## 2021-09-01 NOTE — HISTORY OF PRESENT ILLNESS
[FreeTextEntry1] : 71 y/o w/\par \par *NSTEMI-s/p PCI RCA May '20\par *h/o ASA allergy v. mastocytosis?-unresponsive to ASA\par desensitization, on cilostazol \par \par Prior to visit contacted me re: starting ozempic for weight loss\par recommend seeing a dietician prior to starting this & giving 2-3 months w/ dietician.\par \par Reviewed cardiac rehab data from 3rd session.\par Receiving no guidance on diet - gave number for dietician PAOLA Jefferson\par \par Discussed when to stop cardiac rehab & start exercise on his onw\par LA fitness v. martial arts.\par \par Reports dry mouth since MI - suspects it is related to CV meds.\par \par Asking about evaluation by Dr. Alvarado - Colon allergy.\par \par EK/10/20, NSR, LAE, Old inferior MI. \par Echo: 2020, LAE, MV repair with +1 MR, no pericardial effusion., normal LV function LVEF 55-60%. \par Cardiac Cath: 2020, 1 Vessel CAD with inferior hypokinesis. \par Stent: 2020, JAH to RCA

## 2021-09-01 NOTE — ASSESSMENT
[FreeTextEntry1] : \par A/P:\par \par *CAD\par -will work w/ dietician for 3 months if no weight loss\par will refer to [facility].\par -cont. cardiac rehab until maximum appropriate level of exertion achieved\par then will exercise on his own.\par -Requested records from Dr. Alvarado's office. Left message w/ her office to call me\par to discuss pt's workup for intermittent lip swelling.  Question - is it a true ASA allergy,\par differential diagnosis for his symptoms of intermittent lip swelling\par \par \par Return in 3 months.\par

## 2021-09-07 ENCOUNTER — NON-APPOINTMENT (OUTPATIENT)
Age: 71
End: 2021-09-07

## 2021-09-20 ENCOUNTER — APPOINTMENT (OUTPATIENT)
Dept: VASCULAR SURGERY | Facility: CLINIC | Age: 71
End: 2021-09-20
Payer: MEDICARE

## 2021-09-20 VITALS
DIASTOLIC BLOOD PRESSURE: 90 MMHG | HEART RATE: 76 BPM | OXYGEN SATURATION: 97 % | TEMPERATURE: 97.7 F | SYSTOLIC BLOOD PRESSURE: 141 MMHG

## 2021-09-20 DIAGNOSIS — I87.2 VENOUS INSUFFICIENCY (CHRONIC) (PERIPHERAL): ICD-10-CM

## 2021-09-20 PROCEDURE — 99212 OFFICE O/P EST SF 10 MIN: CPT

## 2021-09-20 NOTE — PHYSICAL EXAM
[JVD] : no jugular venous distention  [Normal Breath Sounds] : Normal breath sounds [Normal Heart Sounds] : normal heart sounds [2+] : left 2+ [Ankle Swelling (On Exam)] : present [Ankle Swelling On The Left] : of the left ankle [Varicose Veins Of Lower Extremities] : present [Varicose Veins Of The Left Leg] : of the left leg [] : of the left leg [Ankle Swelling On The Right] : mild [Abdomen Masses] : No abdominal masses [Abdomen Tenderness] : ~T ~M No abdominal tenderness [No Rash or Lesion] : No rash or lesion [Alert] : alert [Oriented to Person] : oriented to person [Oriented to Place] : oriented to place [Oriented to Time] : oriented to time [Calm] : calm [de-identified] : Well appearing [de-identified] : NCAT [FreeTextEntry1] : Mild Left leg edema and venous stasis dermatitis. No ulceration

## 2021-09-20 NOTE — HISTORY OF PRESENT ILLNESS
[de-identified] : Patient is s/p left small saphenous vein ablation 2 months ago\par Left leg swelling and pain are significantly less\par Patient is very happy with the results [FreeTextEntry1] : 70 year old male presents for evaluation of left leg swelling and discoloration. Symptoms have worsened over the last year. He complains of dull aching with standing or sitting, heaviness in his left leg. He has ankle swelling that worsens through the day.\par He regularly wears compression stockings with some relief\par No history of DVT.\par He denies claudication, rest pain or leg ulcers.\par Patient has had previous vein stripping in his left leg many years ago for venous insufficiency.\par History also significant for CAD and recent NSTEMI for which he underwent PCI in May 2020\par \par

## 2021-09-20 NOTE — ASSESSMENT
[FreeTextEntry1] : 70 year old man with symptomatic left leg venous insufficiency.\par Patient had a venous duplex in September 2020 that revealed pathologic reflux (>5 seconds) in the left short saphenous vein as well as deep venous reflux.\par Patient is s/p left small saphenous vein ablation 2 months ago\par Left leg swelling and pain are significantly less\par Patient is very happy with the results\par -Continue knee high compression stockings\par -May return PRN

## 2021-09-22 ENCOUNTER — APPOINTMENT (OUTPATIENT)
Dept: UROLOGY | Facility: CLINIC | Age: 71
End: 2021-09-22
Payer: MEDICARE

## 2021-09-22 VITALS
HEIGHT: 72 IN | DIASTOLIC BLOOD PRESSURE: 97 MMHG | BODY MASS INDEX: 31.83 KG/M2 | SYSTOLIC BLOOD PRESSURE: 167 MMHG | TEMPERATURE: 97.3 F | WEIGHT: 235 LBS | OXYGEN SATURATION: 97 % | HEART RATE: 69 BPM

## 2021-09-22 DIAGNOSIS — L29.1 PRURITUS SCROTI: ICD-10-CM

## 2021-09-22 PROCEDURE — 99213 OFFICE O/P EST LOW 20 MIN: CPT

## 2021-09-22 NOTE — ASSESSMENT
[FreeTextEntry1] : His primary complaint is no doubt related to his allergic syndrome for which she is seeing allergist.  I asked him to purchase a hydrocortisone cream for the scrotum and to soak scrotum and salt water several times a day.  In the meantime he is following up with his allergy work-up.  His regular yearly labs are sent and he will follow-up regarding them.

## 2021-09-22 NOTE — PHYSICAL EXAM
[General Appearance - Well Developed] : well developed [General Appearance - Well Nourished] : well nourished [Normal Appearance] : normal appearance [Well Groomed] : well groomed [General Appearance - In No Acute Distress] : no acute distress [Abdomen Soft] : soft [Abdomen Tenderness] : non-tender [Costovertebral Angle Tenderness] : no ~M costovertebral angle tenderness [Urinary Bladder Findings] : the bladder was normal on palpation [FreeTextEntry1] : There is mild erythema of the scrotum and a mild hydrocele on the left side.  Prostate is small to medium in size and palpably benign [Edema] : no peripheral edema [] : no respiratory distress [Respiration, Rhythm And Depth] : normal respiratory rhythm and effort [Exaggerated Use Of Accessory Muscles For Inspiration] : no accessory muscle use [Oriented To Time, Place, And Person] : oriented to person, place, and time [Affect] : the affect was normal [Mood] : the mood was normal [Not Anxious] : not anxious [Normal Station and Gait] : the gait and station were normal for the patient's age [No Focal Deficits] : no focal deficits [No Palpable Adenopathy] : no palpable adenopathy

## 2021-09-22 NOTE — HISTORY OF PRESENT ILLNESS
[FreeTextEntry1] : This patient is here for evaluation of scrotal pruritus.  He is noted this over the past several days but is also in the midst of an evaluation for allergies.  He has had pruritus in other parts of the body.  He is also here for his regular  checkup

## 2021-09-23 LAB
APPEARANCE: ABNORMAL
BACTERIA: ABNORMAL
BILIRUBIN URINE: NEGATIVE
BLOOD URINE: NEGATIVE
COLOR: YELLOW
GLUCOSE QUALITATIVE U: NEGATIVE
HYALINE CASTS: 2 /LPF
KETONES URINE: NEGATIVE
LEUKOCYTE ESTERASE URINE: NEGATIVE
MICROSCOPIC-UA: NORMAL
NITRITE URINE: POSITIVE
PH URINE: 8
PROTEIN URINE: ABNORMAL
PSA SERPL-MCNC: 2.42 NG/ML
RED BLOOD CELLS URINE: 0 /HPF
SPECIFIC GRAVITY URINE: 1.02
SQUAMOUS EPITHELIAL CELLS: 0 /HPF
URINE COMMENTS: NORMAL
UROBILINOGEN URINE: NORMAL
WHITE BLOOD CELLS URINE: 6 /HPF

## 2021-09-27 DIAGNOSIS — R39.15 URGENCY OF URINATION: ICD-10-CM

## 2021-09-27 LAB — URINE CYTOLOGY: NORMAL

## 2021-09-28 PROBLEM — R39.15 URINARY URGENCY: Status: ACTIVE | Noted: 2020-10-09

## 2021-11-06 ENCOUNTER — RX RENEWAL (OUTPATIENT)
Age: 71
End: 2021-11-06

## 2021-11-06 DIAGNOSIS — N20.0 CALCULUS OF KIDNEY: ICD-10-CM

## 2021-11-22 NOTE — H&P PST ADULT - NEGATIVE GENERAL GENITOURINARY SYMPTOMS
no incontinence/no dysuria/no nocturia/no flank pain L/normal urinary frequency/no urinary hesitancy/no flank pain R show

## 2022-01-07 ENCOUNTER — APPOINTMENT (OUTPATIENT)
Dept: CARDIOLOGY | Facility: CLINIC | Age: 72
End: 2022-01-07

## 2022-01-12 ENCOUNTER — NON-APPOINTMENT (OUTPATIENT)
Age: 72
End: 2022-01-12

## 2022-01-21 ENCOUNTER — RX RENEWAL (OUTPATIENT)
Age: 72
End: 2022-01-21

## 2022-02-02 ENCOUNTER — NON-APPOINTMENT (OUTPATIENT)
Age: 72
End: 2022-02-02

## 2022-02-03 ENCOUNTER — INPATIENT (INPATIENT)
Facility: HOSPITAL | Age: 72
LOS: 0 days | Discharge: HOME | DRG: 247 | End: 2022-02-04
Attending: HOSPITALIST | Admitting: HOSPITALIST
Payer: MEDICARE

## 2022-02-03 VITALS — HEIGHT: 72 IN | WEIGHT: 235.01 LBS

## 2022-02-03 DIAGNOSIS — Z96.643 PRESENCE OF ARTIFICIAL HIP JOINT, BILATERAL: Chronic | ICD-10-CM

## 2022-02-03 DIAGNOSIS — I21.4 NON-ST ELEVATION (NSTEMI) MYOCARDIAL INFARCTION: ICD-10-CM

## 2022-02-03 DIAGNOSIS — Z41.9 ENCOUNTER FOR PROCEDURE FOR PURPOSES OTHER THAN REMEDYING HEALTH STATE, UNSPECIFIED: Chronic | ICD-10-CM

## 2022-02-03 DIAGNOSIS — Z98.890 OTHER SPECIFIED POSTPROCEDURAL STATES: Chronic | ICD-10-CM

## 2022-02-03 LAB
ALBUMIN SERPL ELPH-MCNC: 3.5 G/DL — SIGNIFICANT CHANGE UP (ref 3.3–5)
ALP SERPL-CCNC: 78 U/L — SIGNIFICANT CHANGE UP (ref 40–120)
ALT FLD-CCNC: 33 U/L — SIGNIFICANT CHANGE UP (ref 12–78)
ANION GAP SERPL CALC-SCNC: 5 MMOL/L — SIGNIFICANT CHANGE UP (ref 5–17)
APTT BLD: 36.6 SEC — HIGH (ref 27.5–35.5)
AST SERPL-CCNC: 37 U/L — SIGNIFICANT CHANGE UP (ref 15–37)
BASOPHILS # BLD AUTO: 0.04 K/UL — SIGNIFICANT CHANGE UP (ref 0–0.2)
BASOPHILS NFR BLD AUTO: 0.4 % — SIGNIFICANT CHANGE UP (ref 0–2)
BILIRUB SERPL-MCNC: 1 MG/DL — SIGNIFICANT CHANGE UP (ref 0.2–1.2)
BLD GP AB SCN SERPL QL: SIGNIFICANT CHANGE UP
BUN SERPL-MCNC: 19 MG/DL — SIGNIFICANT CHANGE UP (ref 7–23)
CALCIUM SERPL-MCNC: 9 MG/DL — SIGNIFICANT CHANGE UP (ref 8.5–10.1)
CHLORIDE SERPL-SCNC: 105 MMOL/L — SIGNIFICANT CHANGE UP (ref 96–108)
CO2 SERPL-SCNC: 27 MMOL/L — SIGNIFICANT CHANGE UP (ref 22–31)
CREAT SERPL-MCNC: 0.93 MG/DL — SIGNIFICANT CHANGE UP (ref 0.5–1.3)
EOSINOPHIL # BLD AUTO: 0.14 K/UL — SIGNIFICANT CHANGE UP (ref 0–0.5)
EOSINOPHIL NFR BLD AUTO: 1.5 % — SIGNIFICANT CHANGE UP (ref 0–6)
GLUCOSE SERPL-MCNC: 165 MG/DL — HIGH (ref 70–99)
HCT VFR BLD CALC: 46.8 % — SIGNIFICANT CHANGE UP (ref 39–50)
HGB BLD-MCNC: 15.9 G/DL — SIGNIFICANT CHANGE UP (ref 13–17)
IMM GRANULOCYTES NFR BLD AUTO: 0.2 % — SIGNIFICANT CHANGE UP (ref 0–1.5)
INR BLD: 1.08 RATIO — SIGNIFICANT CHANGE UP (ref 0.88–1.16)
LYMPHOCYTES # BLD AUTO: 1.59 K/UL — SIGNIFICANT CHANGE UP (ref 1–3.3)
LYMPHOCYTES # BLD AUTO: 17.4 % — SIGNIFICANT CHANGE UP (ref 13–44)
MCHC RBC-ENTMCNC: 30.9 PG — SIGNIFICANT CHANGE UP (ref 27–34)
MCHC RBC-ENTMCNC: 34 GM/DL — SIGNIFICANT CHANGE UP (ref 32–36)
MCV RBC AUTO: 90.9 FL — SIGNIFICANT CHANGE UP (ref 80–100)
MONOCYTES # BLD AUTO: 0.81 K/UL — SIGNIFICANT CHANGE UP (ref 0–0.9)
MONOCYTES NFR BLD AUTO: 8.9 % — SIGNIFICANT CHANGE UP (ref 2–14)
NEUTROPHILS # BLD AUTO: 6.53 K/UL — SIGNIFICANT CHANGE UP (ref 1.8–7.4)
NEUTROPHILS NFR BLD AUTO: 71.6 % — SIGNIFICANT CHANGE UP (ref 43–77)
PLATELET # BLD AUTO: 179 K/UL — SIGNIFICANT CHANGE UP (ref 150–400)
POTASSIUM SERPL-MCNC: 3.5 MMOL/L — SIGNIFICANT CHANGE UP (ref 3.5–5.3)
POTASSIUM SERPL-SCNC: 3.5 MMOL/L — SIGNIFICANT CHANGE UP (ref 3.5–5.3)
PROT SERPL-MCNC: 7.7 GM/DL — SIGNIFICANT CHANGE UP (ref 6–8.3)
PROTHROM AB SERPL-ACNC: 12.5 SEC — SIGNIFICANT CHANGE UP (ref 10.6–13.6)
RBC # BLD: 5.15 M/UL — SIGNIFICANT CHANGE UP (ref 4.2–5.8)
RBC # FLD: 13 % — SIGNIFICANT CHANGE UP (ref 10.3–14.5)
SARS-COV-2 RNA SPEC QL NAA+PROBE: SIGNIFICANT CHANGE UP
SODIUM SERPL-SCNC: 137 MMOL/L — SIGNIFICANT CHANGE UP (ref 135–145)
TROPONIN I, HIGH SENSITIVITY RESULT: 2455.58 NG/L — HIGH
TROPONIN I, HIGH SENSITIVITY RESULT: 3429.52 NG/L — HIGH
WBC # BLD: 9.13 K/UL — SIGNIFICANT CHANGE UP (ref 3.8–10.5)
WBC # FLD AUTO: 9.13 K/UL — SIGNIFICANT CHANGE UP (ref 3.8–10.5)

## 2022-02-03 PROCEDURE — 93306 TTE W/DOPPLER COMPLETE: CPT | Mod: 26

## 2022-02-03 PROCEDURE — C1887: CPT

## 2022-02-03 PROCEDURE — 86850 RBC ANTIBODY SCREEN: CPT

## 2022-02-03 PROCEDURE — 93306 TTE W/DOPPLER COMPLETE: CPT

## 2022-02-03 PROCEDURE — 99285 EMERGENCY DEPT VISIT HI MDM: CPT

## 2022-02-03 PROCEDURE — C1725: CPT

## 2022-02-03 PROCEDURE — 99223 1ST HOSP IP/OBS HIGH 75: CPT

## 2022-02-03 PROCEDURE — 93454 CORONARY ARTERY ANGIO S&I: CPT | Mod: 59

## 2022-02-03 PROCEDURE — 85027 COMPLETE CBC AUTOMATED: CPT

## 2022-02-03 PROCEDURE — 86900 BLOOD TYPING SEROLOGIC ABO: CPT

## 2022-02-03 PROCEDURE — 80061 LIPID PANEL: CPT

## 2022-02-03 PROCEDURE — 83036 HEMOGLOBIN GLYCOSYLATED A1C: CPT

## 2022-02-03 PROCEDURE — 71045 X-RAY EXAM CHEST 1 VIEW: CPT | Mod: 26

## 2022-02-03 PROCEDURE — 93010 ELECTROCARDIOGRAM REPORT: CPT | Mod: 76

## 2022-02-03 PROCEDURE — 36415 COLL VENOUS BLD VENIPUNCTURE: CPT

## 2022-02-03 PROCEDURE — 80048 BASIC METABOLIC PNL TOTAL CA: CPT

## 2022-02-03 PROCEDURE — 84484 ASSAY OF TROPONIN QUANT: CPT

## 2022-02-03 PROCEDURE — C1769: CPT

## 2022-02-03 PROCEDURE — C9600: CPT | Mod: LC

## 2022-02-03 PROCEDURE — C1894: CPT

## 2022-02-03 PROCEDURE — 93005 ELECTROCARDIOGRAM TRACING: CPT

## 2022-02-03 PROCEDURE — 99153 MOD SED SAME PHYS/QHP EA: CPT

## 2022-02-03 PROCEDURE — 99152 MOD SED SAME PHYS/QHP 5/>YRS: CPT

## 2022-02-03 PROCEDURE — 99222 1ST HOSP IP/OBS MODERATE 55: CPT

## 2022-02-03 PROCEDURE — 93931 UPPER EXTREMITY STUDY: CPT | Mod: RT

## 2022-02-03 PROCEDURE — C1874: CPT

## 2022-02-03 PROCEDURE — 86901 BLOOD TYPING SEROLOGIC RH(D): CPT

## 2022-02-03 RX ORDER — EZETIMIBE 10 MG/1
10 TABLET ORAL DAILY
Refills: 0 | Status: DISCONTINUED | OUTPATIENT
Start: 2022-02-03 | End: 2022-02-04

## 2022-02-03 RX ORDER — TICAGRELOR 90 MG/1
90 TABLET ORAL EVERY 12 HOURS
Refills: 0 | Status: DISCONTINUED | OUTPATIENT
Start: 2022-02-04 | End: 2022-02-04

## 2022-02-03 RX ORDER — FINASTERIDE 5 MG/1
5 TABLET, FILM COATED ORAL DAILY
Refills: 0 | Status: DISCONTINUED | OUTPATIENT
Start: 2022-02-03 | End: 2022-02-04

## 2022-02-03 RX ORDER — HEPARIN SODIUM 5000 [USP'U]/ML
9000 INJECTION INTRAVENOUS; SUBCUTANEOUS EVERY 6 HOURS
Refills: 0 | Status: DISCONTINUED | OUTPATIENT
Start: 2022-02-03 | End: 2022-02-04

## 2022-02-03 RX ORDER — CARVEDILOL PHOSPHATE 80 MG/1
3.12 CAPSULE, EXTENDED RELEASE ORAL
Refills: 0 | Status: DISCONTINUED | OUTPATIENT
Start: 2022-02-03 | End: 2022-02-03

## 2022-02-03 RX ORDER — OXYBUTYNIN CHLORIDE 5 MG
5 TABLET ORAL AT BEDTIME
Refills: 0 | Status: DISCONTINUED | OUTPATIENT
Start: 2022-02-03 | End: 2022-02-04

## 2022-02-03 RX ORDER — CARVEDILOL PHOSPHATE 80 MG/1
6.25 CAPSULE, EXTENDED RELEASE ORAL EVERY 12 HOURS
Refills: 0 | Status: DISCONTINUED | OUTPATIENT
Start: 2022-02-03 | End: 2022-02-04

## 2022-02-03 RX ORDER — NITROGLYCERIN 6.5 MG
0.4 CAPSULE, EXTENDED RELEASE ORAL
Refills: 0 | Status: DISCONTINUED | OUTPATIENT
Start: 2022-02-03 | End: 2022-02-04

## 2022-02-03 RX ORDER — HEPARIN SODIUM 5000 [USP'U]/ML
9000 INJECTION INTRAVENOUS; SUBCUTANEOUS ONCE
Refills: 0 | Status: COMPLETED | OUTPATIENT
Start: 2022-02-03 | End: 2022-02-03

## 2022-02-03 RX ORDER — CARVEDILOL PHOSPHATE 80 MG/1
6.12 CAPSULE, EXTENDED RELEASE ORAL
Refills: 0 | Status: DISCONTINUED | OUTPATIENT
Start: 2022-02-03 | End: 2022-02-03

## 2022-02-03 RX ORDER — HEPARIN SODIUM 5000 [USP'U]/ML
INJECTION INTRAVENOUS; SUBCUTANEOUS
Qty: 25000 | Refills: 0 | Status: DISCONTINUED | OUTPATIENT
Start: 2022-02-03 | End: 2022-02-04

## 2022-02-03 RX ORDER — CILOSTAZOL 100 MG/1
100 TABLET ORAL
Refills: 0 | Status: DISCONTINUED | OUTPATIENT
Start: 2022-02-03 | End: 2022-02-04

## 2022-02-03 RX ORDER — TAMSULOSIN HYDROCHLORIDE 0.4 MG/1
1 CAPSULE ORAL
Qty: 0 | Refills: 0 | DISCHARGE

## 2022-02-03 RX ORDER — CILOSTAZOL 100 MG/1
100 TABLET ORAL
Refills: 0 | Status: DISCONTINUED | OUTPATIENT
Start: 2022-02-03 | End: 2022-02-03

## 2022-02-03 RX ORDER — HYDRALAZINE HCL 50 MG
5 TABLET ORAL ONCE
Refills: 0 | Status: COMPLETED | OUTPATIENT
Start: 2022-02-03 | End: 2022-02-03

## 2022-02-03 RX ORDER — FONDAPARINUX SODIUM 2.5 MG/.5ML
1 INJECTION, SOLUTION SUBCUTANEOUS
Qty: 0 | Refills: 0 | DISCHARGE

## 2022-02-03 RX ORDER — HEPARIN SODIUM 5000 [USP'U]/ML
4000 INJECTION INTRAVENOUS; SUBCUTANEOUS EVERY 6 HOURS
Refills: 0 | Status: DISCONTINUED | OUTPATIENT
Start: 2022-02-03 | End: 2022-02-04

## 2022-02-03 RX ORDER — OXYBUTYNIN CHLORIDE 5 MG
5 TABLET ORAL DAILY
Refills: 0 | Status: DISCONTINUED | OUTPATIENT
Start: 2022-02-03 | End: 2022-02-04

## 2022-02-03 RX ORDER — METOPROLOL TARTRATE 50 MG
0.5 TABLET ORAL
Qty: 0 | Refills: 0 | DISCHARGE

## 2022-02-03 RX ORDER — FLUOXETINE HCL 10 MG
20 CAPSULE ORAL DAILY
Refills: 0 | Status: DISCONTINUED | OUTPATIENT
Start: 2022-02-03 | End: 2022-02-04

## 2022-02-03 RX ORDER — SODIUM CHLORIDE 9 MG/ML
1000 INJECTION INTRAMUSCULAR; INTRAVENOUS; SUBCUTANEOUS
Refills: 0 | Status: DISCONTINUED | OUTPATIENT
Start: 2022-02-03 | End: 2022-02-04

## 2022-02-03 RX ORDER — CLOPIDOGREL BISULFATE 75 MG/1
525 TABLET, FILM COATED ORAL ONCE
Refills: 0 | Status: COMPLETED | OUTPATIENT
Start: 2022-02-03 | End: 2022-02-03

## 2022-02-03 RX ORDER — ALLOPURINOL 300 MG
100 TABLET ORAL DAILY
Refills: 0 | Status: DISCONTINUED | OUTPATIENT
Start: 2022-02-03 | End: 2022-02-04

## 2022-02-03 RX ADMIN — Medication 5 MILLIGRAM(S): at 22:02

## 2022-02-03 RX ADMIN — CARVEDILOL PHOSPHATE 6.25 MILLIGRAM(S): 80 CAPSULE, EXTENDED RELEASE ORAL at 21:58

## 2022-02-03 RX ADMIN — HEPARIN SODIUM 1900 UNIT(S)/HR: 5000 INJECTION INTRAVENOUS; SUBCUTANEOUS at 12:34

## 2022-02-03 RX ADMIN — CLOPIDOGREL BISULFATE 525 MILLIGRAM(S): 75 TABLET, FILM COATED ORAL at 12:42

## 2022-02-03 RX ADMIN — HEPARIN SODIUM 9000 UNIT(S): 5000 INJECTION INTRAVENOUS; SUBCUTANEOUS at 12:41

## 2022-02-03 RX ADMIN — Medication 80 MILLIGRAM(S): at 22:02

## 2022-02-03 RX ADMIN — CILOSTAZOL 100 MILLIGRAM(S): 100 TABLET ORAL at 21:58

## 2022-02-03 NOTE — ED ADULT NURSE NOTE - OBJECTIVE STATEMENT
Pt presents to the ED c/o chest discomfort and pain in the left neck and shoulder since last night. PMH of MI in 2020, CVA and HTN. Pt reports the discomfort as the "need to belch" but unable to do so. He also reports this feeling as similar to what he felt in 2020 before his last MI. Pt denies SOB.

## 2022-02-03 NOTE — ED PROVIDER NOTE - PROGRESS NOTE DETAILS
Trop noted.  Discussed with Dr. España who discussed with Dr. Christiansen.  Plan for admission.  Likely cath in am.  Further care per inpatient treatment team.

## 2022-02-03 NOTE — ED PROVIDER NOTE - CLINICAL SUMMARY MEDICAL DECISION MAKING FREE TEXT BOX
Will evaluate for ACS vs GERD va PNA. Pt well appearing. EKG with minimal depressions to V2 and V3. Given similar to prior MI, likely admit.

## 2022-02-03 NOTE — H&P ADULT - NSHPPHYSICALEXAM_GEN_ALL_CORE
ICU Vital Signs Last 24 Hrs  T(C): 36.8 (03 Feb 2022 10:19), Max: 36.8 (03 Feb 2022 10:19)  T(F): 98.2 (03 Feb 2022 10:19), Max: 98.2 (03 Feb 2022 10:19)  HR: 71 (03 Feb 2022 14:41) (71 - 78)  BP: 158/103 (03 Feb 2022 14:41) (158/103 - 176/106)  BP(mean): 119 (03 Feb 2022 14:41) (119 - 127)  ABP: --  ABP(mean): --  RR: 16 (03 Feb 2022 14:41) (16 - 19)  SpO2: 97% (03 Feb 2022 14:41) (97% - 98%)

## 2022-02-03 NOTE — ED PROVIDER NOTE - OBJECTIVE STATEMENT
70 yo male with a PMHx of BPH, CAD x1 stent, HLD, HTN, MI, presents to the ED for left sided chest discomfort since yesterday. Pt reports increased indigestion and belching. Pt notes symptoms are similar to when he had an MI but does not feel weak. Denies fever/chills, cough, SOB, abdominal pain, back pain or N/V/D. Pt had COVID in December 2021. No hx of DVT or PE. No other complaints at this time. Cardio: Dr. Ortiz.

## 2022-02-03 NOTE — H&P ADULT - HISTORY OF PRESENT ILLNESS
72 yo M with pmh CAD/MI 5/2020 s/p stent to RCA on plavix only due to aspirin allergy s/p aspirin desensitization in 5/2020, however he subsequent lip swelling post desensitization and contraindicated to take aspirin,  myalgias with crestor and lipitor now on pravastain presents with crushing left sided chest pain that began suddenly last night. States that it feels like his last MI. Associated belching and indigestion.   No N/V/F/C. No SOB. No abd pain. No h/o VTE. Had covid 12/21    Ed course: Plavix loaded. Heparin gtt started    PAST MEDICAL & SURGICAL HISTORY:  Cerebrovascular accident (CVA)  H/O mitral valve repair    Allergies  aspirin (Angioedema (Mild))  Intolerances      SOCIAL HISTORY: smokes cigars, casual ETOH.    FAMILY HISTORY:  FH: CAD (coronary artery disease)

## 2022-02-03 NOTE — PATIENT PROFILE ADULT - NSPRESCRALCSCORE_GEN_A_NUR_CAL
6/7/19    55 55 65 60 60 65 60 60 60 60 58 60 60 60 65   Lateral Pinch 12 11 12 12 10 12 12 11 12 14 12 12 13 14 14   3 Point Pinch 9 10 10 11 11 10 11 8 10 10 10 12 11 12 11   Tip Pinch 6 8 8 9 9 8 9 6 8 8 8 8 8 9 10   standard testing positions unless otherwise noted,* denotes pain, average reported  Comments: Only muscle strength that was assessed are noted.  Norms:  : 45-49 years, male: left .6, right 86.9-132.9; female: left 43.3-68.7, right 47.1-77.3  Key/lateral pinch: 45-49 years, male: left 20.4-29.2, right 21.9-29.7; female: left 13.7-19.5, right 14.4-20.8  Palmar/3 point pinch: 45-49 years, male: left 19.9-27.5, right 20.7-27.3; female: left 14.7-20.3, right 14.9-20.9  Tip pinch: 45-49 years, male: left 13.5-21.7, right 13.8-23.6; female: left 9.4-14.8, right 10.2-16.2    Treatment     Therapeutic exercise: graded clothes pin pinching 1, 2,4,6,8 pinch forces  Putty grasping and pinching putty   Putty twisting clockwise and counterclockwise  Putty flattening-increased discomfort  Recheck strength  straps for orthosis changed  Home Program:   Orthosis decrease use as tolerated  Putty grasping and pinching-  manipulation of objects.-  Stretches for wrist and finger extensors.-   Plan: Range of motion and strengthening as tolerated     Goals: To be obtained by end of this plan of care:  1. Patient will be independent with progressed and modified home exercise program - goal met  2. Decrease involved right wrist pain to 1/10 pain, for resumption of self-cares with affected hand and eliminate need for pain medication use.- goal partially met  3. Patient will report mild difficulty opening a tight new jar- goal remains current  4. No difficulty turning a key- goal met  5. No difficulty writing- goal partially met  6. Mild difficulty carrying an object 10 pounds across the gym 4 times- goal remains current  7. Patient will report no difficulty using a knife to cut food- goal partially  met  8. Patient will report mild difficulty performing activities of daily living- goal partially met  9. Patient will increase her  strength to 70 pounds- goal remains  current  10. Patient will increase her lateral Pinch to 13 pounds- goal met  11. Patient will increase her 3 point Pinch to 11 pounds- goal met  Patient will increase her tip pinch to 8 pounds - goal met  Patient will be able to perform functional activities without increased pain out of the orthosis 50% of her day.- goal partially met  Suggestions for next session as indicated: progress per plan of care,strengthening and functional activities to improve strength and endurance without causing ulnar sided wrist pain.    ASSESSMENT   She is making good gains in improvement in her strength along with her ability to perform functional activities outside of her orthosis. We will continue to work on strengthening activities to improve endurance and ability to tolerate tasks outside of her orthosis   Result of above outlined education: Verbalizes understanding  Outcome Measures: (Outcome Scoring)  Initial Disabilities of the Arm, Shoulder and Hand (DASH): 38.33 (scored 0-100; a higher score indicates greater disability)   4/2/19  Updated Long DASH 43.33  THERAPY DAILY BILLING   Insurance: [employer]. [facility]    Evaluation Procedures:  No evaluation codes were used on this date of service    Timed Procedures:  Therapeutic Exercise, 45 minutes    Untimed Procedures:  No untimed codes were used on this date of service   Total Treatment Time:45 minutes         1

## 2022-02-03 NOTE — ED STATDOCS - PROGRESS NOTE DETAILS
70 yo male w/PMH of MI, CAD x1 stent, BPH, HLD, HTN presents to the ED for left side chest discomfort and a pain to left shoulder since yesterday. Pt also reports episodes of belching. Pt does have hx of MI and states this feels similar but with no weakness. Denies fever/chills, cough, SOB, abdominal pain, back pain or N/V/D. Pt had COVID in December 2021.

## 2022-02-03 NOTE — CONSULT NOTE ADULT - PROBLEM SELECTOR RECOMMENDATION 9
Typical chest discomfort (similar to last NSTEMI 2 yrs ago) & initial trop 2500.  Loaded w/ plavix in ED.  cont. hep gtt, statin.    NO ASA as suspect ASA allergy - being evaluated by allergist (Dr. Palomares) as OP  & discussed case w/ him, substantial suspicion remains.  & is being evaluated for this.    Plan for cath. this evening or tommorrow.  Postcath will treat w/ plavix (may switch to brilinta in cath lab)  & cilostazol - used cilostazol after prior NSTEMI for 1 yr w/o problems.    Discussed plan w/ Cristo Yun (interventionalist) & Dr. Stroud (hospitalist. Typical chest discomfort (similar to last NSTEMI 2 yrs ago) & initial trop 2500.  Loaded w/ plavix in ED.  cont. hep gtt, statin.    NO ASA as suspect ASA allergy. Suspect this despite densitization protocol May '20  because 2 days after his May '20 cath he reported lip swelling.  Being evaluated by allergist (Dr. Palomares) as OP & he advises no ASA use at this time.  will evaluate ASA allergy further as an OP in 1 month.    Plan for cath. this evening.  Postcath will treat w/ plavix (may switch to brilinta in cath lab)  & cilostazol - used cilostazol after prior NSTEMI for 1 yr w/o problems.    Discussed plan w/ Cristo Yun (interventionalist) & Dr. Stroud (hospitalist. Typical chest discomfort (similar to last NSTEMI 2 yrs ago) & initial trop 2500.  Loaded w/ plavix in ED.  cont. hep gtt, statin.    NO ASA as suspect ASA allergy. Suspect this despite densitization protocol May '20  because 2 days after his May '20 cath he reported lip swelling.  Being evaluated by allergist (Dr. Palomares) as OP & he advises no ASA use at this time.  will evaluate ASA allergy further as an OP in 1 month.    Plan for cath. this evening.  Postcath will treat w/ plavix (may switch to brilinta in cath lab)  & cilostazol - used cilostazol after prior NSTEMI for 1 yr w/o problems.    Discussed plan w/ Cristo Yun (interventionalist) & Dr. Stroud (hospitalist.    ===================  Addendum:  Discussed w/ allergist Dr. Palomares his history of aspirin desensitization  May '20.  Per Dr. Palomares, this confirms aspirin allergy & pt cannot  be on ASA in the future.

## 2022-02-03 NOTE — PHARMACOTHERAPY INTERVENTION NOTE - COMMENTS
Medication history complete, reviewed medication with patient [facility]

## 2022-02-03 NOTE — PACU DISCHARGE NOTE - COMMENTS
Mallory hanson on cicu received report.  Pt is alert and oriented x 3. vital signs stable.  Right arm site soft, no bleeding or hematoma noted.  Right radial hemoband intact with fingers warm and mobile with good capillary refill.  monitor pattern NSR.  Pt left via transport with life ioana attached accompanied by rn . Safety maintained.

## 2022-02-03 NOTE — CONSULT NOTE ADULT - SUBJECTIVE AND OBJECTIVE BOX
HPI:  69 y/o w/    *NSTEMI-s/p PCI RCA x 2 May '20  *possible ASA allergy,  *HLP, BARRERA, kidney stones    presented to ED w/ chest discomfort.  Onset yesterday while at rest, vague indigestion type discomfort,  not related to exertion but very similar to symptoms prior to NSTEMI  May '20 but less severe.  Symptoms were intermittent  lasting minutes during episodes - as they persisted  he came to ED for further evaluation.    No dyspnea, palpitations, syncope, lightheadness  or other cardiac symptoms.    ECG w/ NSR, Q waves inferiorly, borderline ST depressions V2, V3 compared w/ prior ECGs.    Currently is comfortable w/ no chest discomfort.  Initial trop 2500.    PAST MEDICAL AND SURGICAL HISTORY:  as noted above  BPH (benign prostatic hyperplasia)  HTN (hypertension)  Stroke  2001  CAD, multiple vessel  with 2 stents 5/2020  HLD (hyperlipidemia)  Hematuria  Bladder stones  Sleep apnea  uses cpap  H/O mitral valve repair  1998  S/P bilateral hip replacements  2005 &amp; 2010  Elective surgery  cardiac stents 5/2020    ALLERGIES:  Allergies  aspirin (Angioedema (Mild))  Intolerances    SOCIAL HISTORY:  neg x 3    FAMILY  HISTORY:  FH: CAD (coronary artery disease)        MEDICATIONS:  OUTPATIENT:  Home Medications:  allopurinol 100 mg oral tablet: 1 tab(s) orally once a day (03 Feb 2022 13:04)  carvedilol 3.125 mg oral tablet: 1 tab(s) orally 2 times a day (03 Feb 2022 13:04)  ezetimibe 10 mg oral tablet: 1 tab(s) orally once a day (03 Feb 2022 13:04)  finasteride 5 mg oral tablet: 1 tab(s) orally once a day (03 Feb 2022 13:04)  FLUoxetine 20 mg oral capsule: 1 cap(s) orally once a day (03 Feb 2022 13:04)  hydroCHLOROthiazide 25 mg oral tablet: 1 tab(s) orally once a day (03 Feb 2022 13:04)  oxybutynin 5 mg oral tablet: 2 tab(s) orally once a day (in the morning) (03 Feb 2022 13:04)  oxybutynin 5 mg oral tablet: 1 tab(s) orally once a day (at bedtime) (03 Feb 2022 13:04)  pravastatin 80 mg oral tablet: 1 tab(s) orally once a day (03 Feb 2022 13:04)  plavix 75 daily      INPATIENT:  MEDICATIONS  (STANDING):  allopurinol 100 milliGRAM(s) Oral daily  carvedilol Oral Tab/Cap - Peds 3.125 milliGRAM(s) Oral two times a day  ezetimibe 10 milliGRAM(s) Oral daily  finasteride 5 milliGRAM(s) Oral daily  FLUoxetine 20 milliGRAM(s) Oral daily  heparin  Infusion.  Unit(s)/Hr (19 mL/Hr) IV Continuous <Continuous>  oxybutynin 5 milliGRAM(s) Oral daily  oxybutynin 5 milliGRAM(s) Oral at bedtime  pravastatin 80 milliGRAM(s) Oral at bedtime    MEDICATIONS  (PRN):  heparin   Injectable 9000 Unit(s) IV Push every 6 hours PRN For aPTT less than 40  heparin   Injectable 4000 Unit(s) IV Push every 6 hours PRN For aPTT between 40 - 57    MEDICATIONS  (PRN):  heparin   Injectable 9000 Unit(s) IV Push every 6 hours PRN For aPTT less than 40  heparin   Injectable 4000 Unit(s) IV Push every 6 hours PRN For aPTT between 40 - 57    REVIEW OF SYSTEMS:    CONSTITUTIONAL: No weakness, fevers or chills  EYES/ENT: No visual changes;  No vertigo or throat pain   NECK: No pain or stiffness  RESPIRATORY: No cough, wheezing, hemoptysis; No shortness of breath  CARDIOVASCULAR: No chest pain or palpitations  GASTROINTESTINAL: No abdominal or epigastric pain. No nausea, vomiting, or hematemesis; No diarrhea or constipation. No melena or hematochezia.  GENITOURINARY: No dysuria, frequency or hematuria  NEUROLOGICAL: No numbness or weakness  SKIN: No itching, burning, rashes, or lesions   All other review of systems is negative unless indicated above    Vital Signs Last 24 Hrs  T(C): 36.8 (03 Feb 2022 10:19), Max: 36.8 (03 Feb 2022 10:19)  T(F): 98.2 (03 Feb 2022 10:19), Max: 98.2 (03 Feb 2022 10:19)  HR: 78 (03 Feb 2022 10:19) (78 - 78)  BP: 176/106 (03 Feb 2022 10:19) (176/106 - 176/106)  BP(mean): 127 (03 Feb 2022 10:19) (127 - 127)  RR: 19 (03 Feb 2022 10:19) (19 - 19)  SpO2: 98% (03 Feb 2022 10:19) (98% - 98%)    PHYSICAL EXAM:    Constitutional: NAD, awake and alert,   HEENT: PERR, EOMI,  No oral cyananosis.  Neck:  supple,  No JVD  Respiratory: Breath sounds are clear bilaterally, No wheezing, rales or rhonchi  Cardiovascular: S1 and S2, regular rate and rhythm, no Murmurs, gallops or rubs  Gastrointestinal: Bowel Sounds present, soft, nontender.   Extremities: No peripheral edema. No clubbing or cyanosis.  Vascular: 2+ peripheral pulses  Neurological: A/O x 3, no focal deficits  Musculoskeletal: no calf tenderness.  Skin: No rashes.      LABS: All Labs Reviewed:                        15.9   9.13  )-----------( 179      ( 03 Feb 2022 11:20 )             46.8     03 Feb 2022 11:20    137    |  105    |  19     ----------------------------<  165    3.5     |  27     |  0.93     Ca    9.0        03 Feb 2022 11:20    TPro  7.7    /  Alb  3.5    /  TBili  1.0    /  DBili  x      /  AST  37     /  ALT  33     /  AlkPhos  78     03 Feb 2022 11:20    PT/INR - ( 03 Feb 2022 11:20 )   PT: 12.5 sec;   INR: 1.08 ratio         PTT - ( 03 Feb 2022 11:20 )  PTT:36.6 sec    ===============================  EKG:   ECG w/ NSR, Q waves inferiorly, borderline ST depressions V2, V3 compared w/ prior ECGs.  ===============================  ECHO - May '20     Impression     Summary     Technically difficult study with poor visualization of cardiac valves.     Endocardium is not well visualized, however, overall left ventricular   systolic function appears normal.   Technically Difficult Study.   Estimated left ventricular ejection fraction is 55-60 %.   The left atrium is mildly dilated.   Normal appearing right ventricle structure and function.     Fibrocalcific changes noted to the Aortic valve leaflets with preserved   leaflet excursion.   Trace aortic regurgitation is present.   Pt is s/p MV repair. The MV repair appears intact.   Mild (1+) mitral regurgitation is present.   EA reversal of the mitral inflow consistent with reduced compliance of the   left ventricle   Trace tricuspid valve regurgitation is present.     No evidence of pericardial effusion.     Signature     ----------------------------------------------------------------   Electronically signed by Darci Nassar DO(Interpreting   physician) on 05/18/2020 02:53 PM   ----------------------------------------------------------------    ===============================  CARDIAC CATH MAY '20    Procedure Start Time: 05/18/2020 10:09.    Procedure  Procedure Type:    Drug Eluting Coronary Stent , Coronary Thrombectomy , Coronary Angiography,  Left Heart Cath With Ventriculogram, Interventional IVUS, Activated Clotting  Time and Hemostasis with Hemoband    Admission Data  Admission Date: 05/17/2020    Admission Status: Inpatient    Current Diagnosis: NSTEMI (Presented with chest pain at rest and ruled in  for NSTEMI. LISA Risk score 3. CCS class III.).        -The patient's anginal syndrome was assessed as Non-STEMI.     Medical History    History of Disease  +-----------------------------------------------+----+---------------------+  !Diagnosis                                      !Date!Comments             !  +-----------------------------------------------+----+---------------------+  !Valvular heart disease                         !    !S/P mvr 2000.        !  +-----------------------------------------------+----+---------------------+  !Cerebrovascular disease->CVA                   !    !                     !  +-----------------------------------------------+----+---------------------+     Risk Factors     The patient risk factors include:cerebrovascular disease, family history   of premature CAD, prior valve surgery/procedure, dyslipidemia and   Current/Recent(w/in 1 year) tobacco use.       Estimated:    Pressure  +-----+--------------------------------------------------------------------+  !Site !Pressure  !  +-----+--------------------------------------------------------------------+  !AO   !144/76 (93)                                                         !  +-----+--------------------------------------------------------------------+  !LV   !150/3 ,22                                                           !  +-----+--------------------------------------------------------------------+     Condition: Post LV     Estimated:    Pressure  +-----+--------------------------------------------------------------------+  !Site !Pressure                                                            !  +-----+--------------------------------------------------------------------+  !AO   !119/64 (89) !  +-----+--------------------------------------------------------------------+  !AO   !138/63 (94)                                                         !  +-----+--------------------------------------------------------------------+  !LV   !149/0,25                                                           !  +-----+--------------------------------------------------------------------+     Angiographic Findings   Cardiac Arteries and Lesion Findings    LMCA: Diffuse irregularity.There is mild diffuse disease noted.  LAD: Diffuse irregularity.There is mild diffuse disease noted.  LCx: Diffuse irregularity.There is mild diffuse disease noted.    RCA: Diffuse irregularity.   Dist RCA: Proximal subsection.100% stenosis 36 mm length reduced to 0%.Pre   procedure LISA 0 flow was noted. Good runoff was present.The lesion was   diagnosed as a high risk lesion.The lesion was diffuse.The lesion showed   evidence of thrombus presence, with irregular contour, mild angulation and   moderate tortuosity.Lesion plaque is ruptured.     Post Procedure LISA III flow was present.   Treatment results:Interventional treatment was successful.     Mid RCA: Mid subsection.45% stenosis 12 mm length.Pre procedure LISA III   flow was noted. The lesion was diagnosed as a moderate risk lesion.The   lesion was heavily calcified.     Devices used    Valves  +------+---------+-------------+-------------------------------------------+  !Valve !Stenosis !Insufficiency!Comments                                   !  +------+---------+-------------+-------------------------------------------+  !Aortic!No       !Not assessed !     !  +------+---------+-------------+-------------------------------------------+  !Mitral!Not      !             !Unable to comment on MR as suboptimal      !  !      !assessed !             !ventriculogram. Echo to be performed.      !  +------+---------+-------------+-------------------------------------------+    LV function assessed as:Normal.  Ejection Fraction  +----------------------------------------------------------------------+---+  !Method                     !EF%!  +----------------------------------------------------------------------+---+  !LV gram                                                               !55 !  +----------------------------------------------------------------------+---+    VA  Ventriculography Findings:  Normal left ventricular systolic function.     Coronary tree   Dominance: Right   Impression     Diagnostic Conclusions   1 Vessel CAD with culprit lession in RCA and NL LV FX.     Interventional Conclusions   Successful Coronary Intervention JAH of distal RCA.    Estimated Blood Loss:5ml.    Complications:  No complication.     Signatures     ----------------------------------------------------------------   Electronically signed by Pepe Frias MD(Performing   Physician) on 05/18/2020 12:27   ----------------------------------------------------------------    ===============================    Elie Christiansen M.D.  Cardiology, [facility]  Cell:   Office:   [phone] ([facility] Office)  [phone] ([facility] Office)     HPI:  71 y/o w/    *NSTEMI-s/p PCI RCA x 2 May '20  *possible ASA allergy,  *Mitral valve repair '98 (R minithoracotomy)  *HLP, BARRERA, kidney stones    presented to ED w/ chest discomfort.  Onset yesterday while at rest, vague indigestion type discomfort,  not related to exertion but very similar to symptoms prior to NSTEMI  May '20 but less severe.  Symptoms were intermittent  lasting minutes during episodes - as they persisted  he came to ED for further evaluation.    No dyspnea, palpitations, syncope, lightheadness  or other cardiac symptoms.    ECG w/ NSR, Q waves inferiorly, borderline ST depressions V2, V3 compared w/ prior ECGs.    Currently is comfortable w/ no chest discomfort.  Initial trop 2500.    Reviewed w/ him history of possible ASA allergy.  Prior to cath mentioned swelling in tongue for years    Prior to cath in '20, pt mentioned he was swelling   underneath tongue for months after being on ASA for years,   d/janelle 1 month prior tw/ resolution of symptoms.       PAST MEDICAL AND SURGICAL HISTORY:  as noted above  BPH (benign prostatic hyperplasia)  HTN (hypertension)  Stroke  2001  CAD, multiple vessel  with 2 stents 5/2020  HLD (hyperlipidemia)  Hematuria  Bladder stones  Sleep apnea  uses cpap  H/O mitral valve repair  1998 [facility]  S/P bilateral hip replacements  2005 &amp; 2010  Elective surgery  cardiac stents 5/2020    ALLERGIES:  Allergies  aspirin (Angioedema (Mild))  Intolerances    SOCIAL HISTORY:  neg x 3    FAMILY  HISTORY:  FH: CAD (coronary artery disease)        MEDICATIONS:  OUTPATIENT:  Home Medications:  allopurinol 100 mg oral tablet: 1 tab(s) orally once a day (03 Feb 2022 13:04)  carvedilol 3.125 mg oral tablet: 1 tab(s) orally 2 times a day (03 Feb 2022 13:04)  ezetimibe 10 mg oral tablet: 1 tab(s) orally once a day (03 Feb 2022 13:04)  finasteride 5 mg oral tablet: 1 tab(s) orally once a day (03 Feb 2022 13:04)  FLUoxetine 20 mg oral capsule: 1 cap(s) orally once a day (03 Feb 2022 13:04)  hydroCHLOROthiazide 25 mg oral tablet: 1 tab(s) orally once a day (03 Feb 2022 13:04)  oxybutynin 5 mg oral tablet: 2 tab(s) orally once a day (in the morning) (03 Feb 2022 13:04)  oxybutynin 5 mg oral tablet: 1 tab(s) orally once a day (at bedtime) (03 Feb 2022 13:04)  pravastatin 80 mg oral tablet: 1 tab(s) orally once a day (03 Feb 2022 13:04)  plavix 75 daily      INPATIENT:  MEDICATIONS  (STANDING):  allopurinol 100 milliGRAM(s) Oral daily  carvedilol Oral Tab/Cap - Peds 3.125 milliGRAM(s) Oral two times a day  ezetimibe 10 milliGRAM(s) Oral daily  finasteride 5 milliGRAM(s) Oral daily  FLUoxetine 20 milliGRAM(s) Oral daily  heparin  Infusion.  Unit(s)/Hr (19 mL/Hr) IV Continuous <Continuous>  oxybutynin 5 milliGRAM(s) Oral daily  oxybutynin 5 milliGRAM(s) Oral at bedtime  pravastatin 80 milliGRAM(s) Oral at bedtime    MEDICATIONS  (PRN):  heparin   Injectable 9000 Unit(s) IV Push every 6 hours PRN For aPTT less than 40  heparin   Injectable 4000 Unit(s) IV Push every 6 hours PRN For aPTT between 40 - 57    MEDICATIONS  (PRN):  heparin   Injectable 9000 Unit(s) IV Push every 6 hours PRN For aPTT less than 40  heparin   Injectable 4000 Unit(s) IV Push every 6 hours PRN For aPTT between 40 - 57    REVIEW OF SYSTEMS:    CONSTITUTIONAL: No weakness, fevers or chills  EYES/ENT: No visual changes;  No vertigo or throat pain   NECK: No pain or stiffness  RESPIRATORY: No cough, wheezing, hemoptysis; No shortness of breath  CARDIOVASCULAR: No chest pain or palpitations  GASTROINTESTINAL: No abdominal or epigastric pain. No nausea, vomiting, or hematemesis; No diarrhea or constipation. No melena or hematochezia.  GENITOURINARY: No dysuria, frequency or hematuria  NEUROLOGICAL: No numbness or weakness  SKIN: No itching, burning, rashes, or lesions   All other review of systems is negative unless indicated above    Vital Signs Last 24 Hrs  T(C): 36.8 (03 Feb 2022 10:19), Max: 36.8 (03 Feb 2022 10:19)  T(F): 98.2 (03 Feb 2022 10:19), Max: 98.2 (03 Feb 2022 10:19)  HR: 78 (03 Feb 2022 10:19) (78 - 78)  BP: 176/106 (03 Feb 2022 10:19) (176/106 - 176/106)  BP(mean): 127 (03 Feb 2022 10:19) (127 - 127)  RR: 19 (03 Feb 2022 10:19) (19 - 19)  SpO2: 98% (03 Feb 2022 10:19) (98% - 98%)    PHYSICAL EXAM:    Constitutional: NAD, awake and alert,   HEENT: PERR, EOMI,  No oral cyananosis.  Neck:  supple,  No JVD  Respiratory: Breath sounds are clear bilaterally, No wheezing, rales or rhonchi  Cardiovascular: S1 and S2, regular rate and rhythm, no Murmurs, gallops or rubs  Gastrointestinal: Bowel Sounds present, soft, nontender.   Extremities: No peripheral edema. No clubbing or cyanosis.  Vascular: 2+ peripheral pulses  Neurological: A/O x 3, no focal deficits  Musculoskeletal: no calf tenderness.  Skin: No rashes.      LABS: All Labs Reviewed:                        15.9   9.13  )-----------( 179      ( 03 Feb 2022 11:20 )             46.8     03 Feb 2022 11:20    137    |  105    |  19     ----------------------------<  165    3.5     |  27     |  0.93     Ca    9.0        03 Feb 2022 11:20    TPro  7.7    /  Alb  3.5    /  TBili  1.0    /  DBili  x      /  AST  37     /  ALT  33     /  AlkPhos  78     03 Feb 2022 11:20    PT/INR - ( 03 Feb 2022 11:20 )   PT: 12.5 sec;   INR: 1.08 ratio         PTT - ( 03 Feb 2022 11:20 )  PTT:36.6 sec    ===============================  EKG:   ECG w/ NSR, Q waves inferiorly, borderline ST depressions V2, V3 compared w/ prior ECGs.  ===============================  ECHO - May '20     Impression     Summary     Technically difficult study with poor visualization of cardiac valves.     Endocardium is not well visualized, however, overall left ventricular   systolic function appears normal.   Technically Difficult Study.   Estimated left ventricular ejection fraction is 55-60 %.   The left atrium is mildly dilated.   Normal appearing right ventricle structure and function.     Fibrocalcific changes noted to the Aortic valve leaflets with preserved   leaflet excursion.   Trace aortic regurgitation is present.   Pt is s/p MV repair. The MV repair appears intact.   Mild (1+) mitral regurgitation is present.   EA reversal of the mitral inflow consistent with reduced compliance of the   left ventricle   Trace tricuspid valve regurgitation is present.     No evidence of pericardial effusion.     Signature     ----------------------------------------------------------------   Electronically signed by Darci Nassar DO(Interpreting   physician) on 05/18/2020 02:53 PM   ----------------------------------------------------------------    ===============================  CARDIAC CATH MAY '20    Procedure Start Time: 05/18/2020 10:09.    Procedure  Procedure Type:    Drug Eluting Coronary Stent , Coronary Thrombectomy , Coronary Angiography,  Left Heart Cath With Ventriculogram, Interventional IVUS, Activated Clotting  Time and Hemostasis with Hemoband    Admission Data  Admission Date: 05/17/2020    Admission Status: Inpatient    Current Diagnosis: NSTEMI (Presented with chest pain at rest and ruled in  for NSTEMI. LISA Risk score 3. CCS class III.).        -The patient's anginal syndrome was assessed as Non-STEMI.     Medical History    History of Disease  +-----------------------------------------------+----+---------------------+  !Diagnosis                                      !Date!Comments             !  +-----------------------------------------------+----+---------------------+  !Valvular heart disease                         !    !S/P mvr 2000.        !  +-----------------------------------------------+----+---------------------+  !Cerebrovascular disease->CVA                   !    !                     !  +-----------------------------------------------+----+---------------------+     Risk Factors     The patient risk factors include:cerebrovascular disease, family history   of premature CAD, prior valve surgery/procedure, dyslipidemia and   Current/Recent(w/in 1 year) tobacco use.       Estimated:    Pressure  +-----+--------------------------------------------------------------------+  !Site !Pressure  !  +-----+--------------------------------------------------------------------+  !AO   !144/76 (93)                                                         !  +-----+--------------------------------------------------------------------+  !LV   !150/3 ,22                                                           !  +-----+--------------------------------------------------------------------+     Condition: Post LV     Estimated:    Pressure  +-----+--------------------------------------------------------------------+  !Site !Pressure                                                            !  +-----+--------------------------------------------------------------------+  !AO   !119/64 (89) !  +-----+--------------------------------------------------------------------+  !AO   !138/63 (94)                                                         !  +-----+--------------------------------------------------------------------+  !LV   !149/0,25                                                           !  +-----+--------------------------------------------------------------------+     Angiographic Findings   Cardiac Arteries and Lesion Findings    LMCA: Diffuse irregularity.There is mild diffuse disease noted.  LAD: Diffuse irregularity.There is mild diffuse disease noted.  LCx: Diffuse irregularity.There is mild diffuse disease noted.    RCA: Diffuse irregularity.   Dist RCA: Proximal subsection.100% stenosis 36 mm length reduced to 0%.Pre   procedure LISA 0 flow was noted. Good runoff was present.The lesion was   diagnosed as a high risk lesion.The lesion was diffuse.The lesion showed   evidence of thrombus presence, with irregular contour, mild angulation and   moderate tortuosity.Lesion plaque is ruptured.     Post Procedure LISA III flow was present.   Treatment results:Interventional treatment was successful.     Mid RCA: Mid subsection.45% stenosis 12 mm length.Pre procedure LISA III   flow was noted. The lesion was diagnosed as a moderate risk lesion.The   lesion was heavily calcified.     Devices used    Valves  +------+---------+-------------+-------------------------------------------+  !Valve !Stenosis !Insufficiency!Comments                                   !  +------+---------+-------------+-------------------------------------------+  !Aortic!No       !Not assessed !     !  +------+---------+-------------+-------------------------------------------+  !Mitral!Not      !             !Unable to comment on MR as suboptimal      !  !      !assessed !             !ventriculogram. Echo to be performed.      !  +------+---------+-------------+-------------------------------------------+    LV function assessed as:Normal.  Ejection Fraction  +----------------------------------------------------------------------+---+  !Method                     !EF%!  +----------------------------------------------------------------------+---+  !LV gram                                                               !55 !  +----------------------------------------------------------------------+---+    VA  Ventriculography Findings:  Normal left ventricular systolic function.     Coronary tree   Dominance: Right   Impression     Diagnostic Conclusions   1 Vessel CAD with culprit lession in RCA and NL LV FX.     Interventional Conclusions   Successful Coronary Intervention JAH of distal RCA.    Estimated Blood Loss:5ml.    Complications:  No complication.     Signatures     ----------------------------------------------------------------   Electronically signed by Pepe Frias MD(Performing   Physician) on 05/18/2020 12:27   ----------------------------------------------------------------    ===============================    Elie Christiansen M.D.  Cardiology, [facility]  Cell: [phone]  Office:   [phone] ([facility] Office)  [phone] ([facility] Office)     HPI:  69 y/o w/    *NSTEMI-s/p PCI RCA x 2 May '20  *possible ASA allergy,  *Mitral valve repair '98 (R minithoracotomy)  *HLP, BARRERA, kidney stones    presented to ED w/ chest discomfort.  Onset yesterday while at rest, vague indigestion type discomfort,  not related to exertion but very similar to symptoms prior to NSTEMI  May '20 but less severe.  Symptoms were intermittent  lasting minutes during episodes - as they persisted  he came to ED for further evaluation.    No dyspnea, palpitations, syncope, lightheadness  or other cardiac symptoms.    ECG w/ NSR, Q waves inferiorly, borderline ST depressions V2, V3 compared w/ prior ECGs.    Currently is comfortable w/ no chest discomfort.  Initial trop 2500.    Reviewed w/ him history of possible ASA allergy.  Prior to cath in '20, pt mentioned he was swelling   underneath tongue for months after being on ASA for years,   d/janelle 1 month prior w/ resolution of symptoms.  After cath had ASA desensitization protocol  Pt called 2 days after cath w/ lip swelling & ASA d/janelle   reduced dose xarelto started.  later changed to cilostazol   due to bruising Oct '20.    PAST MEDICAL AND SURGICAL HISTORY:  as noted above  BPH (benign prostatic hyperplasia)  HTN (hypertension)  Stroke  2001  CAD, multiple vessel  with 2 stents 5/2020  HLD (hyperlipidemia)  Hematuria  Bladder stones  Sleep apnea  uses cpap  H/O mitral valve repair  1998 [facility]  S/P bilateral hip replacements  2005 &amp; 2010  Elective surgery  cardiac stents 5/2020    ALLERGIES:  Allergies  aspirin (Angioedema (Mild))  Intolerances    SOCIAL HISTORY:  neg x 3    FAMILY  HISTORY:  FH: CAD (coronary artery disease)        MEDICATIONS:  OUTPATIENT:  Home Medications:  allopurinol 100 mg oral tablet: 1 tab(s) orally once a day (03 Feb 2022 13:04)  carvedilol 3.125 mg oral tablet: 1 tab(s) orally 2 times a day (03 Feb 2022 13:04)  ezetimibe 10 mg oral tablet: 1 tab(s) orally once a day (03 Feb 2022 13:04)  finasteride 5 mg oral tablet: 1 tab(s) orally once a day (03 Feb 2022 13:04)  FLUoxetine 20 mg oral capsule: 1 cap(s) orally once a day (03 Feb 2022 13:04)  hydroCHLOROthiazide 25 mg oral tablet: 1 tab(s) orally once a day (03 Feb 2022 13:04)  oxybutynin 5 mg oral tablet: 2 tab(s) orally once a day (in the morning) (03 Feb 2022 13:04)  oxybutynin 5 mg oral tablet: 1 tab(s) orally once a day (at bedtime) (03 Feb 2022 13:04)  pravastatin 80 mg oral tablet: 1 tab(s) orally once a day (03 Feb 2022 13:04)  plavix 75 daily      INPATIENT:  MEDICATIONS  (STANDING):  allopurinol 100 milliGRAM(s) Oral daily  carvedilol Oral Tab/Cap - Peds 3.125 milliGRAM(s) Oral two times a day  ezetimibe 10 milliGRAM(s) Oral daily  finasteride 5 milliGRAM(s) Oral daily  FLUoxetine 20 milliGRAM(s) Oral daily  heparin  Infusion.  Unit(s)/Hr (19 mL/Hr) IV Continuous <Continuous>  oxybutynin 5 milliGRAM(s) Oral daily  oxybutynin 5 milliGRAM(s) Oral at bedtime  pravastatin 80 milliGRAM(s) Oral at bedtime    MEDICATIONS  (PRN):  heparin   Injectable 9000 Unit(s) IV Push every 6 hours PRN For aPTT less than 40  heparin   Injectable 4000 Unit(s) IV Push every 6 hours PRN For aPTT between 40 - 57    MEDICATIONS  (PRN):  heparin   Injectable 9000 Unit(s) IV Push every 6 hours PRN For aPTT less than 40  heparin   Injectable 4000 Unit(s) IV Push every 6 hours PRN For aPTT between 40 - 57    REVIEW OF SYSTEMS:    CONSTITUTIONAL: No weakness, fevers or chills  EYES/ENT: No visual changes;  No vertigo or throat pain   NECK: No pain or stiffness  RESPIRATORY: No cough, wheezing, hemoptysis; No shortness of breath  CARDIOVASCULAR: No chest pain or palpitations  GASTROINTESTINAL: No abdominal or epigastric pain. No nausea, vomiting, or hematemesis; No diarrhea or constipation. No melena or hematochezia.  GENITOURINARY: No dysuria, frequency or hematuria  NEUROLOGICAL: No numbness or weakness  SKIN: No itching, burning, rashes, or lesions   All other review of systems is negative unless indicated above    Vital Signs Last 24 Hrs  T(C): 36.8 (03 Feb 2022 10:19), Max: 36.8 (03 Feb 2022 10:19)  T(F): 98.2 (03 Feb 2022 10:19), Max: 98.2 (03 Feb 2022 10:19)  HR: 78 (03 Feb 2022 10:19) (78 - 78)  BP: 176/106 (03 Feb 2022 10:19) (176/106 - 176/106)  BP(mean): 127 (03 Feb 2022 10:19) (127 - 127)  RR: 19 (03 Feb 2022 10:19) (19 - 19)  SpO2: 98% (03 Feb 2022 10:19) (98% - 98%)    PHYSICAL EXAM:    Constitutional: NAD, awake and alert,   HEENT: PERR, EOMI,  No oral cyananosis.  Neck:  supple,  No JVD  Respiratory: Breath sounds are clear bilaterally, No wheezing, rales or rhonchi  Cardiovascular: S1 and S2, regular rate and rhythm, no Murmurs, gallops or rubs  Gastrointestinal: Bowel Sounds present, soft, nontender.   Extremities: No peripheral edema. No clubbing or cyanosis.  Vascular: 2+ peripheral pulses  Neurological: A/O x 3, no focal deficits  Musculoskeletal: no calf tenderness.  Skin: No rashes.      LABS: All Labs Reviewed:                        15.9   9.13  )-----------( 179      ( 03 Feb 2022 11:20 )             46.8     03 Feb 2022 11:20    137    |  105    |  19     ----------------------------<  165    3.5     |  27     |  0.93     Ca    9.0        03 Feb 2022 11:20    TPro  7.7    /  Alb  3.5    /  TBili  1.0    /  DBili  x      /  AST  37     /  ALT  33     /  AlkPhos  78     03 Feb 2022 11:20    PT/INR - ( 03 Feb 2022 11:20 )   PT: 12.5 sec;   INR: 1.08 ratio         PTT - ( 03 Feb 2022 11:20 )  PTT:36.6 sec    ===============================  EKG:   ECG w/ NSR, Q waves inferiorly, borderline ST depressions V2, V3 compared w/ prior ECGs.  ===============================  ECHO - May '20     Impression     Summary     Technically difficult study with poor visualization of cardiac valves.     Endocardium is not well visualized, however, overall left ventricular   systolic function appears normal.   Technically Difficult Study.   Estimated left ventricular ejection fraction is 55-60 %.   The left atrium is mildly dilated.   Normal appearing right ventricle structure and function.     Fibrocalcific changes noted to the Aortic valve leaflets with preserved   leaflet excursion.   Trace aortic regurgitation is present.   Pt is s/p MV repair. The MV repair appears intact.   Mild (1+) mitral regurgitation is present.   EA reversal of the mitral inflow consistent with reduced compliance of the   left ventricle   Trace tricuspid valve regurgitation is present.     No evidence of pericardial effusion.     Signature     ----------------------------------------------------------------   Electronically signed by Darci Nassar DO(Interpreting   physician) on 05/18/2020 02:53 PM   ----------------------------------------------------------------    ===============================  CARDIAC CATH MAY '20    Procedure Start Time: 05/18/2020 10:09.    Procedure  Procedure Type:    Drug Eluting Coronary Stent , Coronary Thrombectomy , Coronary Angiography,  Left Heart Cath With Ventriculogram, Interventional IVUS, Activated Clotting  Time and Hemostasis with Hemoband    Admission Data  Admission Date: 05/17/2020    Admission Status: Inpatient    Current Diagnosis: NSTEMI (Presented with chest pain at rest and ruled in  for NSTEMI. LISA Risk score 3. CCS class III.).        -The patient's anginal syndrome was assessed as Non-STEMI.     Medical History    History of Disease  +-----------------------------------------------+----+---------------------+  !Diagnosis                                      !Date!Comments             !  +-----------------------------------------------+----+---------------------+  !Valvular heart disease                         !    !S/P mvr 2000.        !  +-----------------------------------------------+----+---------------------+  !Cerebrovascular disease->CVA                   !    !                     !  +-----------------------------------------------+----+---------------------+     Risk Factors     The patient risk factors include:cerebrovascular disease, family history   of premature CAD, prior valve surgery/procedure, dyslipidemia and   Current/Recent(w/in 1 year) tobacco use.       Estimated:    Pressure  +-----+--------------------------------------------------------------------+  !Site !Pressure  !  +-----+--------------------------------------------------------------------+  !AO   !144/76 (93)                                                         !  +-----+--------------------------------------------------------------------+  !LV   !150/3 ,22                                                           !  +-----+--------------------------------------------------------------------+     Condition: Post LV     Estimated:    Pressure  +-----+--------------------------------------------------------------------+  !Site !Pressure                                                            !  +-----+--------------------------------------------------------------------+  !AO   !119/64 (89) !  +-----+--------------------------------------------------------------------+  !AO   !138/63 (94)                                                         !  +-----+--------------------------------------------------------------------+  !LV   !149/0,25                                                           !  +-----+--------------------------------------------------------------------+     Angiographic Findings   Cardiac Arteries and Lesion Findings    LMCA: Diffuse irregularity.There is mild diffuse disease noted.  LAD: Diffuse irregularity.There is mild diffuse disease noted.  LCx: Diffuse irregularity.There is mild diffuse disease noted.    RCA: Diffuse irregularity.   Dist RCA: Proximal subsection.100% stenosis 36 mm length reduced to 0%.Pre   procedure LISA 0 flow was noted. Good runoff was present.The lesion was   diagnosed as a high risk lesion.The lesion was diffuse.The lesion showed   evidence of thrombus presence, with irregular contour, mild angulation and   moderate tortuosity.Lesion plaque is ruptured.     Post Procedure LISA III flow was present.   Treatment results:Interventional treatment was successful.     Mid RCA: Mid subsection.45% stenosis 12 mm length.Pre procedure LISA III   flow was noted. The lesion was diagnosed as a moderate risk lesion.The   lesion was heavily calcified.     Devices used    Valves  +------+---------+-------------+-------------------------------------------+  !Valve !Stenosis !Insufficiency!Comments                                   !  +------+---------+-------------+-------------------------------------------+  !Aortic!No       !Not assessed !     !  +------+---------+-------------+-------------------------------------------+  !Mitral!Not      !             !Unable to comment on MR as suboptimal      !  !      !assessed !             !ventriculogram. Echo to be performed.      !  +------+---------+-------------+-------------------------------------------+    LV function assessed as:Normal.  Ejection Fraction  +----------------------------------------------------------------------+---+  !Method                     !EF%!  +----------------------------------------------------------------------+---+  !LV gram                                                               !55 !  +----------------------------------------------------------------------+---+    VA  Ventriculography Findings:  Normal left ventricular systolic function.     Coronary tree   Dominance: Right   Impression     Diagnostic Conclusions   1 Vessel CAD with culprit lession in RCA and NL LV FX.     Interventional Conclusions   Successful Coronary Intervention JAH of distal RCA.    Estimated Blood Loss:5ml.    Complications:  No complication.     Signatures     ----------------------------------------------------------------   Electronically signed by Pepe Frias MD([facility]   Physician) on 05/18/2020 12:27   ----------------------------------------------------------------    ===============================    Elie Christiansen M.D.  Cardiology, [facility]  Cell: [phone]  Office:   [phone] ([facility] Office)  [phone] ([facility] Office)     HPI:  69 y/o w/    *NSTEMI-s/p PCI RCA x 2 May '20  *possible ASA allergy,  *Mitral valve repair '98 (R minithoracotomy)  *HLP, BARRERA, kidney stones    presented to ED w/ chest discomfort.  Onset yesterday while at rest, vague indigestion type discomfort,  not related to exertion but very similar to symptoms prior to NSTEMI  May '20 but less severe.  Symptoms were intermittent  lasting minutes during episodes - as they persisted  he came to ED for further evaluation.    No dyspnea, palpitations, syncope, lightheadness  or other cardiac symptoms.    ECG w/ NSR, Q waves inferiorly, borderline ST depressions   V2, V3 compared w/ prior ECGs.    Currently is comfortable w/ no chest discomfort.  Initial trop 2500.    Reviewed w/ him history of possible ASA allergy.  Prior to cath in '20, pt mentioned he was swelling   underneath tongue for months after being on ASA for years,   D/janelle 1 month prior to cath w/ resolution of symptoms.  After cath had ASA desensitization protocol  Pt called 2 days after cath w/ lip swelling & ASA d/janelle   reduced dose xarelto started.  later changed to cilostazol on Oct '20  due to bruising.    PAST MEDICAL AND SURGICAL HISTORY:  as noted above  BPH (benign prostatic hyperplasia)  HTN (hypertension)  Stroke  2001  CAD, multiple vessel  with 2 stents 5/2020  HLD (hyperlipidemia)  Hematuria  Bladder stones  Sleep apnea  uses cpap  H/O mitral valve repair  1998 [facility]  S/P bilateral hip replacements  2005 &amp; 2010  Elective surgery  cardiac stents 5/2020    ALLERGIES:  Allergies  aspirin (Angioedema (Mild))  Intolerances    SOCIAL HISTORY:  neg x 3    FAMILY  HISTORY:  FH: CAD (coronary artery disease)        MEDICATIONS:  OUTPATIENT:  Home Medications:  allopurinol 100 mg oral tablet: 1 tab(s) orally once a day (03 Feb 2022 13:04)  carvedilol 3.125 mg oral tablet: 1 tab(s) orally 2 times a day (03 Feb 2022 13:04)  ezetimibe 10 mg oral tablet: 1 tab(s) orally once a day (03 Feb 2022 13:04)  finasteride 5 mg oral tablet: 1 tab(s) orally once a day (03 Feb 2022 13:04)  FLUoxetine 20 mg oral capsule: 1 cap(s) orally once a day (03 Feb 2022 13:04)  hydroCHLOROthiazide 25 mg oral tablet: 1 tab(s) orally once a day (03 Feb 2022 13:04)  oxybutynin 5 mg oral tablet: 2 tab(s) orally once a day (in the morning) (03 Feb 2022 13:04)  oxybutynin 5 mg oral tablet: 1 tab(s) orally once a day (at bedtime) (03 Feb 2022 13:04)  pravastatin 80 mg oral tablet: 1 tab(s) orally once a day (03 Feb 2022 13:04)  plavix 75 daily      INPATIENT:  MEDICATIONS  (STANDING):  allopurinol 100 milliGRAM(s) Oral daily  carvedilol Oral Tab/Cap - Peds 3.125 milliGRAM(s) Oral two times a day  ezetimibe 10 milliGRAM(s) Oral daily  finasteride 5 milliGRAM(s) Oral daily  FLUoxetine 20 milliGRAM(s) Oral daily  heparin  Infusion.  Unit(s)/Hr (19 mL/Hr) IV Continuous <Continuous>  oxybutynin 5 milliGRAM(s) Oral daily  oxybutynin 5 milliGRAM(s) Oral at bedtime  pravastatin 80 milliGRAM(s) Oral at bedtime    MEDICATIONS  (PRN):  heparin   Injectable 9000 Unit(s) IV Push every 6 hours PRN For aPTT less than 40  heparin   Injectable 4000 Unit(s) IV Push every 6 hours PRN For aPTT between 40 - 57    MEDICATIONS  (PRN):  heparin   Injectable 9000 Unit(s) IV Push every 6 hours PRN For aPTT less than 40  heparin   Injectable 4000 Unit(s) IV Push every 6 hours PRN For aPTT between 40 - 57    REVIEW OF SYSTEMS:    CONSTITUTIONAL: No weakness, fevers or chills  EYES/ENT: No visual changes;  No vertigo or throat pain   NECK: No pain or stiffness  RESPIRATORY: No cough, wheezing, hemoptysis; No shortness of breath  CARDIOVASCULAR: No chest pain or palpitations  GASTROINTESTINAL: No abdominal or epigastric pain. No nausea, vomiting, or hematemesis; No diarrhea or constipation. No melena or hematochezia.  GENITOURINARY: No dysuria, frequency or hematuria  NEUROLOGICAL: No numbness or weakness  SKIN: No itching, burning, rashes, or lesions   All other review of systems is negative unless indicated above    Vital Signs Last 24 Hrs  T(C): 36.8 (03 Feb 2022 10:19), Max: 36.8 (03 Feb 2022 10:19)  T(F): 98.2 (03 Feb 2022 10:19), Max: 98.2 (03 Feb 2022 10:19)  HR: 78 (03 Feb 2022 10:19) (78 - 78)  BP: 176/106 (03 Feb 2022 10:19) (176/106 - 176/106)  BP(mean): 127 (03 Feb 2022 10:19) (127 - 127)  RR: 19 (03 Feb 2022 10:19) (19 - 19)  SpO2: 98% (03 Feb 2022 10:19) (98% - 98%)    PHYSICAL EXAM:    Constitutional: NAD, awake and alert,   HEENT: PERR, EOMI,  No oral cyananosis.  Neck:  supple,  No JVD  Respiratory: Breath sounds are clear bilaterally, No wheezing, rales or rhonchi  Cardiovascular: S1 and S2, regular rate and rhythm, no Murmurs, gallops or rubs  Gastrointestinal: Bowel Sounds present, soft, nontender.   Extremities: No peripheral edema. No clubbing or cyanosis.  Vascular: 2+ peripheral pulses  Neurological: A/O x 3, no focal deficits  Musculoskeletal: no calf tenderness.  Skin: No rashes.      LABS: All Labs Reviewed:                        15.9   9.13  )-----------( 179      ( 03 Feb 2022 11:20 )             46.8     03 Feb 2022 11:20    137    |  105    |  19     ----------------------------<  165    3.5     |  27     |  0.93     Ca    9.0        03 Feb 2022 11:20    TPro  7.7    /  Alb  3.5    /  TBili  1.0    /  DBili  x      /  AST  37     /  ALT  33     /  AlkPhos  78     03 Feb 2022 11:20    PT/INR - ( 03 Feb 2022 11:20 )   PT: 12.5 sec;   INR: 1.08 ratio         PTT - ( 03 Feb 2022 11:20 )  PTT:36.6 sec    ===============================  EKG:   ECG w/ NSR, Q waves inferiorly, borderline ST depressions V2, V3 compared w/ prior ECGs.  ===============================  ECHO - May '20     Impression     Summary     Technically difficult study with poor visualization of cardiac valves.     Endocardium is not well visualized, however, overall left ventricular   systolic function appears normal.   Technically Difficult Study.   Estimated left ventricular ejection fraction is 55-60 %.   The left atrium is mildly dilated.   Normal appearing right ventricle structure and function.     Fibrocalcific changes noted to the Aortic valve leaflets with preserved   leaflet excursion.   Trace aortic regurgitation is present.   Pt is s/p MV repair. The MV repair appears intact.   Mild (1+) mitral regurgitation is present.   EA reversal of the mitral inflow consistent with reduced compliance of the   left ventricle   Trace tricuspid valve regurgitation is present.     No evidence of pericardial effusion.     Signature     ----------------------------------------------------------------   Electronically signed by Darci Nassar DO(Interpreting   physician) on 05/18/2020 02:53 PM   ----------------------------------------------------------------    ===============================  CARDIAC CATH MAY '20    Procedure Start Time: 05/18/2020 10:09.    Procedure  Procedure Type:    Drug Eluting Coronary Stent , Coronary Thrombectomy , Coronary Angiography,  Left Heart Cath With Ventriculogram, Interventional IVUS, Activated Clotting  Time and Hemostasis with Hemoband    Admission Data  Admission Date: 05/17/2020    Admission Status: Inpatient    Current Diagnosis: NSTEMI (Presented with chest pain at rest and ruled in  for NSTEMI. LISA Risk score 3. CCS class III.).        -The patient's anginal syndrome was assessed as Non-STEMI.     Medical History    History of Disease  +-----------------------------------------------+----+---------------------+  !Diagnosis                                      !Date!Comments             !  +-----------------------------------------------+----+---------------------+  !Valvular heart disease                         !    !S/P mvr 2000.        !  +-----------------------------------------------+----+---------------------+  !Cerebrovascular disease->CVA                   !    !                     !  +-----------------------------------------------+----+---------------------+     Risk Factors     The patient risk factors include:cerebrovascular disease, family history   of premature CAD, prior valve surgery/procedure, dyslipidemia and   Current/Recent(w/in 1 year) tobacco use.       Estimated:    Pressure  +-----+--------------------------------------------------------------------+  !Site !Pressure  !  +-----+--------------------------------------------------------------------+  !AO   !144/76 (93)                                                         !  +-----+--------------------------------------------------------------------+  !LV   !150/3 ,22                                                           !  +-----+--------------------------------------------------------------------+     Condition: Post LV     Estimated:    Pressure  +-----+--------------------------------------------------------------------+  !Site !Pressure                                                            !  +-----+--------------------------------------------------------------------+  !AO   !119/64 (89) !  +-----+--------------------------------------------------------------------+  !AO   !138/63 (94)                                                         !  +-----+--------------------------------------------------------------------+  !LV   !149/0,25                                                           !  +-----+--------------------------------------------------------------------+     Angiographic Findings   Cardiac Arteries and Lesion Findings    LMCA: Diffuse irregularity.There is mild diffuse disease noted.  LAD: Diffuse irregularity.There is mild diffuse disease noted.  LCx: Diffuse irregularity.There is mild diffuse disease noted.    RCA: Diffuse irregularity.   Dist RCA: Proximal subsection.100% stenosis 36 mm length reduced to 0%.Pre   procedure LISA 0 flow was noted. Good runoff was present.The lesion was   diagnosed as a high risk lesion.The lesion was diffuse.The lesion showed   evidence of thrombus presence, with irregular contour, mild angulation and   moderate tortuosity.Lesion plaque is ruptured.     Post Procedure LISA III flow was present.   Treatment results:Interventional treatment was successful.     Mid RCA: Mid subsection.45% stenosis 12 mm length.Pre procedure LISA III   flow was noted. The lesion was diagnosed as a moderate risk lesion.The   lesion was heavily calcified.     Devices used    Valves  +------+---------+-------------+-------------------------------------------+  !Valve !Stenosis !Insufficiency!Comments                                   !  +------+---------+-------------+-------------------------------------------+  !Aortic!No       !Not assessed !     !  +------+---------+-------------+-------------------------------------------+  !Mitral!Not      !             !Unable to comment on MR as suboptimal      !  !      !assessed !             !ventriculogram. Echo to be performed.      !  +------+---------+-------------+-------------------------------------------+    LV function assessed as:Normal.  Ejection Fraction  +----------------------------------------------------------------------+---+  !Method                     !EF%!  +----------------------------------------------------------------------+---+  !LV gram                                                               !55 !  +----------------------------------------------------------------------+---+    VA  Ventriculography Findings:  Normal left ventricular systolic function.     Coronary tree   Dominance: Right   Impression     Diagnostic Conclusions   1 Vessel CAD with culprit lession in RCA and NL LV FX.     Interventional Conclusions   Successful Coronary Intervention JAH of distal RCA.    Estimated Blood Loss:5ml.    Complications:  No complication.     Signatures     ----------------------------------------------------------------   Electronically signed by Pepe Frias MD([facility]   Physician) on 05/18/2020 12:27   ----------------------------------------------------------------    ===============================    Elie Christiansen M.D.  Cardiology, [facility]  Cell: [phone]  Office:   [phone] ([facility] Office)  [phone] ([facility] Office)

## 2022-02-03 NOTE — PROVIDER CONTACT NOTE (CRITICAL VALUE NOTIFICATION) - NS PROVIDER READ BACK TO LAB
Jon Green MD Haag, Shelley D, RN   Caller: Unspecified (Today,  2:04 PM)             If she has not taken a bottle of mg citrate she should try that   Lets get a KUB as well please.  Thanks         yes

## 2022-02-03 NOTE — H&P ADULT - ASSESSMENT
#NSTEMI  -admit to CICU  -Plavix loaded in ED  -d/w Dr Ortiz, ASA remains contraindicated and has not been successfully desensitized  -Cont Pravastatin  -Moprhine prn  -heparin gtt  -Plavix. Discussed substituion with brillinta with Dr Ortiz  -NTG PRN, IVF, trned trops  -2D echo (echo 5/2020: Nl LV function)  -cont BB, will increase to 6.125 bid for optimal BP control  -Keep NPO for now for possible add on to cath this evening, if not may have a LATE dinner and npo a mn for cath in am with Dr Badillo        #H/o CVA/MV repair/HTN/HLD  -resuem home meds      DVT px: heparin gtt

## 2022-02-03 NOTE — PROGRESS NOTE ADULT - SUBJECTIVE AND OBJECTIVE BOX
Cath Lab Nurse Practitioner Note  HPI:  72 yo M with pmh CAD/MI 5/2020 s/p stent to RCA on plavix only due to aspirin allergy s/p aspirin desensitization in 5/2020, however he subsequent lip swelling post desensitization and contraindicated to take aspirin,  myalgias with crestor and lipitor now on pravastain presents with crushing left sided chest pain that began suddenly last night. States that it feels like his last MI. Associated belching and indigestion.   pt referred to cardiac cath , found to be NSTEMI. LHC with possible PCI    s/p LHC : JAH x 1 to OM1  Pt denies chest pain/SOB/palpitations post cath.      Vital Signs Last 24 Hrs  T(C): 36.8 (03 Feb 2022 10:19), Max: 36.8 (03 Feb 2022 10:19)  T(F): 98.2 (03 Feb 2022 10:19), Max: 98.2 (03 Feb 2022 10:19)  HR: 78 (03 Feb 2022 16:00) (71 - 78)  BP: 148/77 (03 Feb 2022 16:00) (148/77 - 176/106)  BP(mean): 100 (03 Feb 2022 16:00) (100 - 127)  RR: 17 (03 Feb 2022 16:00) (16 - 19)  SpO2: 98% (03 Feb 2022 16:00) (97% - 98%)        Labs:                        15.9   9.13  )-----------( 179      ( 03 Feb 2022 11:20 )             46.8     02-03    137  |  105  |  19  ----------------------------<  165<H>  3.5   |  27  |  0.93    Ca    9.0      03 Feb 2022 11:20    TPro  7.7  /  Alb  3.5  /  TBili  1.0  /  DBili  x   /  AST  37  /  ALT  33  /  AlkPhos  78  02-03    PT/INR - ( 03 Feb 2022 11:20 )   PT: 12.5 sec;   INR: 1.08 ratio         PTT - ( 03 Feb 2022 11:20 )  PTT:36.6 sec  MEDICATIONS  (STANDING):  allopurinol 100 milliGRAM(s) Oral daily  carvedilol 6.25 milliGRAM(s) Oral every 12 hours  ezetimibe 10 milliGRAM(s) Oral daily  finasteride 5 milliGRAM(s) Oral daily  FLUoxetine 20 milliGRAM(s) Oral daily  heparin  Infusion.  Unit(s)/Hr (19 mL/Hr) IV Continuous <Continuous>  oxybutynin 5 milliGRAM(s) Oral daily  oxybutynin 5 milliGRAM(s) Oral at bedtime  pravastatin 80 milliGRAM(s) Oral at bedtime  sodium chloride 0.9%. 1000 milliLiter(s) (50 mL/Hr) IV Continuous <Continuous>      PHYSICAL EXAM  GENERAL: NAD, AAOx3  HEAD:  Atraumatic, Normocephalic  EYES: EOMI, PERRLA, conjunctiva and sclera clear  NECK: Supple, No JVD, No LAD  CHEST/LUNG: Clear to auscultation bilaterally; No wheeze  HEART: s1 s2 Regular rate and rhythm; No murmurs, rubs, or gallops  ABDOMEN: Soft, Nontender, Nondistended; Bowel sounds present X 4 quadrants   EXTREMITIES:  2+ Peripheral Pulses, No clubbing, cyanosis, or edema  SKIN: No rashes or lesions,  b/l LE not red, cool to touch,, no open skin no drainage  NEURO: nonfocal CN/motor/sensory/reflexes  Psych: normal affect and behavior, calm and cooperative   PROCEDURE SITE: RRA band to be removed two hours post placement ,Site is without hematoma or bleeding. Sensation and KIRSTY intact. Distal pulses palpable 2+, capillary refill < 2 seconds. Patient denies pain, numbness, tingling, CP or SOB. Clean dry dressing applied     EKG    PROCEDURE RESULTS  Full report to follow     ASSESSMENT : HPI:  72 yo M with pmh CAD/MI 5/2020 s/p stent to RCA on plavix only due to aspirin allergy s/p aspirin desensitization in 5/2020, however he subsequent lip swelling post desensitization and contraindicated to take aspirin,  myalgias with crestor and lipitor now on pravastain presents with crushing left sided chest pain that began suddenly last night. States that it feels like his last MI. Associated belching and indigestion.   Referred to cardiac cath after elevated troponins. [facility] with possible PCI       PLAN:  CAD, s/p JAH to OM1  Admit to CCU/CICU  -continue IV hydration NS @ 50ml/hr  VS, lab, diet and activity per PCI post orders  -continue blocker/statin  -Discontinue Plavix  -Begin Brilliant 90mg daily on 02/03/2022   -f/u EKG in AM, AM Labs  -plan discussed with patient, Dr. Stroud and Dr. Yun  -Discussed therapeutic lifestyle changes to reduce risk factors such as following a cardiac diet, weight loss, maintaining a healthy weight, exercise, smoking cessation, medication compliance, and regular follow-up  with PCP/Cardioloigst  --Post cath instructions reviewed, patient verbalizes and understands instructions         Cath Lab Nurse Practitioner Note  HPI:  72 yo M with pmh CAD/MI 5/2020 s/p stent to RCA on plavix only due to aspirin allergy s/p aspirin desensitization in 5/2020, however he subsequent lip swelling post desensitization and contraindicated to take aspirin,  myalgias with crestor and lipitor now on pravastain presents with crushing left sided chest pain that began suddenly last night. States that it feels like his last MI. Associated belching and indigestion.   pt referred to cardiac cath , found to be NSTEMI. LHC with possible PCI    s/p LHC : JAH x 1 to OM1  Pt denies chest pain/SOB/palpitations post cath.      Vital Signs Last 24 Hrs  T(C): 36.8 (03 Feb 2022 10:19), Max: 36.8 (03 Feb 2022 10:19)  T(F): 98.2 (03 Feb 2022 10:19), Max: 98.2 (03 Feb 2022 10:19)  HR: 78 (03 Feb 2022 16:00) (71 - 78)  BP: 148/77 (03 Feb 2022 16:00) (148/77 - 176/106)  BP(mean): 100 (03 Feb 2022 16:00) (100 - 127)  RR: 17 (03 Feb 2022 16:00) (16 - 19)  SpO2: 98% (03 Feb 2022 16:00) (97% - 98%)        Labs:                        15.9   9.13  )-----------( 179      ( 03 Feb 2022 11:20 )             46.8     02-03    137  |  105  |  19  ----------------------------<  165<H>  3.5   |  27  |  0.93    Ca    9.0      03 Feb 2022 11:20    TPro  7.7  /  Alb  3.5  /  TBili  1.0  /  DBili  x   /  AST  37  /  ALT  33  /  AlkPhos  78  02-03    PT/INR - ( 03 Feb 2022 11:20 )   PT: 12.5 sec;   INR: 1.08 ratio         PTT - ( 03 Feb 2022 11:20 )  PTT:36.6 sec  MEDICATIONS  (STANDING):  allopurinol 100 milliGRAM(s) Oral daily  carvedilol 6.25 milliGRAM(s) Oral every 12 hours  ezetimibe 10 milliGRAM(s) Oral daily  finasteride 5 milliGRAM(s) Oral daily  FLUoxetine 20 milliGRAM(s) Oral daily  heparin  Infusion.  Unit(s)/Hr (19 mL/Hr) IV Continuous <Continuous>  oxybutynin 5 milliGRAM(s) Oral daily  oxybutynin 5 milliGRAM(s) Oral at bedtime  pravastatin 80 milliGRAM(s) Oral at bedtime  sodium chloride 0.9%. 1000 milliLiter(s) (50 mL/Hr) IV Continuous <Continuous>      PHYSICAL EXAM  GENERAL: NAD, AAOx3  HEAD:  Atraumatic, Normocephalic  EYES: EOMI, PERRLA, conjunctiva and sclera clear  NECK: Supple, No JVD, No LAD  CHEST/LUNG: Clear to auscultation bilaterally; No wheeze  HEART: s1 s2 Regular rate and rhythm; No murmurs, rubs, or gallops  ABDOMEN: Soft, Nontender, Nondistended; Bowel sounds present X 4 quadrants   EXTREMITIES:  2+ Peripheral Pulses, No clubbing, cyanosis, or edema  SKIN: No rashes or lesions,  b/l LE not red, cool to touch,, no open skin no drainage  NEURO: nonfocal CN/motor/sensory/reflexes  Psych: normal affect and behavior, calm and cooperative   PROCEDURE SITE: RRA band to be removed two hours post placement ,Site is without hematoma or bleeding. Sensation and KIRSTY intact. Distal pulses palpable 2+, capillary refill < 2 seconds. Patient denies pain, numbness, tingling, CP or SOB. Clean dry dressing applied     EKG    PROCEDURE RESULTS  Full report to follow     ASSESSMENT : HPI:  72 yo M with pmh CAD/MI 5/2020 s/p stent to RCA on plavix only due to aspirin allergy s/p aspirin desensitization in 5/2020, however he subsequent lip swelling post desensitization and contraindicated to take aspirin,  myalgias with crestor and lipitor now on pravastain presents with crushing left sided chest pain that began suddenly last night. States that it feels like his last MI. Associated belching and indigestion.   Referred to cardiac cath after elevated troponins. [facility] with possible PCI       PLAN:  CAD, s/p JAH to OM1  Admit to CCU/CICU  -continue IV hydration NS @ 50ml/hr  VS, lab, diet and activity per PCI post orders  -continue blocker/statin  -Discontinue Plavix  -Begin Brilliant 90mg daily on 02/03/2022   -Pt to begin Cilostazol 100mg BID tonight 02/03/2022  -f/u EKG in AM, AM Labs  -plan discussed with patient, Dr. Stroud, Dr. Christiansen and Dr. Yun  -Discussed therapeutic lifestyle changes to reduce risk factors such as following a cardiac diet, weight loss, maintaining a healthy weight, exercise, smoking cessation, medication compliance, and regular follow-up  with PCP/Cardioloigst  --Post cath instructions reviewed, patient verbalizes and understands instructions         Cath Lab Nurse Practitioner Note  HPI:  70 yo M with pmh CAD/MI 5/2020 s/p stent to RCA on plavix only due to aspirin allergy s/p aspirin desensitization in 5/2020, however he subsequent lip swelling post desensitization and contraindicated to take aspirin,  myalgias with crestor and lipitor now on pravastain presents with crushing left sided chest pain that began suddenly last night. States that it feels like his last MI. Associated belching and indigestion.   pt referred to cardiac cath , found to be NSTEMI. LHC with possible PCI    s/p LHC : JAH x 1 to OM1  Pt denies chest pain/SOB/palpitations post cath.      Vital Signs Last 24 Hrs  T(C): 36.8 (03 Feb 2022 10:19), Max: 36.8 (03 Feb 2022 10:19)  T(F): 98.2 (03 Feb 2022 10:19), Max: 98.2 (03 Feb 2022 10:19)  HR: 78 (03 Feb 2022 16:00) (71 - 78)  BP: 148/77 (03 Feb 2022 16:00) (148/77 - 176/106)  BP(mean): 100 (03 Feb 2022 16:00) (100 - 127)  RR: 17 (03 Feb 2022 16:00) (16 - 19)  SpO2: 98% (03 Feb 2022 16:00) (97% - 98%)        Labs:                        15.9   9.13  )-----------( 179      ( 03 Feb 2022 11:20 )             46.8     02-03    137  |  105  |  19  ----------------------------<  165<H>  3.5   |  27  |  0.93    Ca    9.0      03 Feb 2022 11:20    TPro  7.7  /  Alb  3.5  /  TBili  1.0  /  DBili  x   /  AST  37  /  ALT  33  /  AlkPhos  78  02-03    PT/INR - ( 03 Feb 2022 11:20 )   PT: 12.5 sec;   INR: 1.08 ratio         PTT - ( 03 Feb 2022 11:20 )  PTT:36.6 sec  MEDICATIONS  (STANDING):  allopurinol 100 milliGRAM(s) Oral daily  carvedilol 6.25 milliGRAM(s) Oral every 12 hours  ezetimibe 10 milliGRAM(s) Oral daily  finasteride 5 milliGRAM(s) Oral daily  FLUoxetine 20 milliGRAM(s) Oral daily  heparin  Infusion.  Unit(s)/Hr (19 mL/Hr) IV Continuous <Continuous>  oxybutynin 5 milliGRAM(s) Oral daily  oxybutynin 5 milliGRAM(s) Oral at bedtime  pravastatin 80 milliGRAM(s) Oral at bedtime  sodium chloride 0.9%. 1000 milliLiter(s) (50 mL/Hr) IV Continuous <Continuous>      PHYSICAL EXAM  GENERAL: NAD, AAOx3  HEAD:  Atraumatic, Normocephalic  EYES: EOMI, PERRLA, conjunctiva and sclera clear  NECK: Supple, No JVD, No LAD  CHEST/LUNG: Clear to auscultation bilaterally; No wheeze  HEART: s1 s2 Regular rate and rhythm; No murmurs, rubs, or gallops  ABDOMEN: Soft, Nontender, Nondistended; Bowel sounds present X 4 quadrants   EXTREMITIES:  2+ Peripheral Pulses, No clubbing, cyanosis, or edema  SKIN: No rashes or lesions,  b/l LE not red, cool to touch,, no open skin no drainage  NEURO: nonfocal CN/motor/sensory/reflexes  Psych: normal affect and behavior, calm and cooperative   PROCEDURE SITE: RRA band to be removed two hours post placement ,Site is without hematoma or bleeding. Sensation and KIRSTY intact. Distal pulses palpable 2+, capillary refill < 2 seconds. Patient denies pain, numbness, tingling, CP or SOB. Clean dry dressing applied     EKG Post PCI   NSR rate 73    PROCEDURE RESULTS  Full report to follow     ASSESSMENT : HPI:  70 yo M with pmh CAD/MI 5/2020 s/p stent to RCA on plavix only due to aspirin allergy s/p aspirin desensitization in 5/2020, however he subsequent lip swelling post desensitization and contraindicated to take aspirin,  myalgias with crestor and lipitor now on pravastain presents with crushing left sided chest pain that began suddenly last night. States that it feels like his last MI. Associated belching and indigestion.   Referred to cardiac cath after elevated troponins. [facility] with possible PCI       PLAN:  CAD, s/p JAH to OM1  Admit to CCU/CICU  -continue IV hydration NS @ 50ml/hr  VS, lab, diet and activity per PCI post orders  -continue blocker/statin  -Discontinue Plavix  -Begin Brilliant 90mg daily on 02/03/2022   -Pt to begin Cilostazol 100mg BID tonight 02/03/2022  -f/u EKG in AM, AM Labs  -plan discussed with patient, Dr. Stroud, Dr. Christiansen and Dr. Yun  -Discussed therapeutic lifestyle changes to reduce risk factors such as following a cardiac diet, weight loss, maintaining a healthy weight, exercise, smoking cessation, medication compliance, and regular follow-up  with PCP/Cardioloigst  --Post cath instructions reviewed, patient verbalizes and understands instructions

## 2022-02-03 NOTE — H&P ADULT - NSHPLABSRESULTS_GEN_ALL_CORE
LABS: All Labs Reviewed:                        15.9   9.13  )-----------( 179      ( 03 Feb 2022 11:20 )             46.8     02-03    137  |  105  |  19  ----------------------------<  165<H>  3.5   |  27  |  0.93    Ca    9.0      03 Feb 2022 11:20    TPro  7.7  /  Alb  3.5  /  TBili  1.0  /  DBili  x   /  AST  37  /  ALT  33  /  AlkPhos  78  02-03    PT/INR - ( 03 Feb 2022 11:20 )   PT: 12.5 sec;   INR: 1.08 ratio         PTT - ( 03 Feb 2022 11:20 )  PTT:36.6 sec          Blood Culture:           < from: Xray Chest 1 View- PORTABLE-Urgent (02.03.22 @ 11:42) >    IMPRESSION: Persistent linear scarring left base.    --- End of Report ---    < end of copied text >    EKG: LVH, NSR with no stt changes

## 2022-02-04 ENCOUNTER — TRANSCRIPTION ENCOUNTER (OUTPATIENT)
Age: 72
End: 2022-02-04

## 2022-02-04 VITALS — OXYGEN SATURATION: 95 %

## 2022-02-04 LAB
A1C WITH ESTIMATED AVERAGE GLUCOSE RESULT: 6 % — HIGH (ref 4–5.6)
ANION GAP SERPL CALC-SCNC: 6 MMOL/L — SIGNIFICANT CHANGE UP (ref 5–17)
BUN SERPL-MCNC: 17 MG/DL — SIGNIFICANT CHANGE UP (ref 7–23)
CALCIUM SERPL-MCNC: 8.9 MG/DL — SIGNIFICANT CHANGE UP (ref 8.5–10.1)
CHLORIDE SERPL-SCNC: 106 MMOL/L — SIGNIFICANT CHANGE UP (ref 96–108)
CHOLEST SERPL-MCNC: 161 MG/DL — SIGNIFICANT CHANGE UP
CO2 SERPL-SCNC: 26 MMOL/L — SIGNIFICANT CHANGE UP (ref 22–31)
CREAT SERPL-MCNC: 0.89 MG/DL — SIGNIFICANT CHANGE UP (ref 0.5–1.3)
ESTIMATED AVERAGE GLUCOSE: 126 MG/DL — HIGH (ref 68–114)
GLUCOSE SERPL-MCNC: 124 MG/DL — HIGH (ref 70–99)
HCT VFR BLD CALC: 44.8 % — SIGNIFICANT CHANGE UP (ref 39–50)
HDLC SERPL-MCNC: 42 MG/DL — SIGNIFICANT CHANGE UP
HGB BLD-MCNC: 15 G/DL — SIGNIFICANT CHANGE UP (ref 13–17)
LIPID PNL WITH DIRECT LDL SERPL: 95 MG/DL — SIGNIFICANT CHANGE UP
MCHC RBC-ENTMCNC: 30.6 PG — SIGNIFICANT CHANGE UP (ref 27–34)
MCHC RBC-ENTMCNC: 33.5 GM/DL — SIGNIFICANT CHANGE UP (ref 32–36)
MCV RBC AUTO: 91.4 FL — SIGNIFICANT CHANGE UP (ref 80–100)
NON HDL CHOLESTEROL: 119 MG/DL — SIGNIFICANT CHANGE UP
PLATELET # BLD AUTO: 175 K/UL — SIGNIFICANT CHANGE UP (ref 150–400)
POTASSIUM SERPL-MCNC: 3.5 MMOL/L — SIGNIFICANT CHANGE UP (ref 3.5–5.3)
POTASSIUM SERPL-SCNC: 3.5 MMOL/L — SIGNIFICANT CHANGE UP (ref 3.5–5.3)
RBC # BLD: 4.9 M/UL — SIGNIFICANT CHANGE UP (ref 4.2–5.8)
RBC # FLD: 13.2 % — SIGNIFICANT CHANGE UP (ref 10.3–14.5)
SODIUM SERPL-SCNC: 138 MMOL/L — SIGNIFICANT CHANGE UP (ref 135–145)
TRIGL SERPL-MCNC: 124 MG/DL — SIGNIFICANT CHANGE UP
TROPONIN I, HIGH SENSITIVITY RESULT: 5439.03 NG/L — HIGH
WBC # BLD: 9.32 K/UL — SIGNIFICANT CHANGE UP (ref 3.8–10.5)
WBC # FLD AUTO: 9.32 K/UL — SIGNIFICANT CHANGE UP (ref 3.8–10.5)

## 2022-02-04 PROCEDURE — 93931 UPPER EXTREMITY STUDY: CPT | Mod: 26,RT

## 2022-02-04 PROCEDURE — 93010 ELECTROCARDIOGRAM REPORT: CPT

## 2022-02-04 PROCEDURE — 99239 HOSP IP/OBS DSCHRG MGMT >30: CPT

## 2022-02-04 PROCEDURE — 99233 SBSQ HOSP IP/OBS HIGH 50: CPT

## 2022-02-04 RX ORDER — TICAGRELOR 90 MG/1
1 TABLET ORAL
Qty: 60 | Refills: 11
Start: 2022-02-04 | End: 2023-01-29

## 2022-02-04 RX ORDER — CILOSTAZOL 100 MG/1
1 TABLET ORAL
Qty: 60 | Refills: 11
Start: 2022-02-04 | End: 2023-01-29

## 2022-02-04 RX ORDER — CARVEDILOL PHOSPHATE 80 MG/1
1 CAPSULE, EXTENDED RELEASE ORAL
Qty: 0 | Refills: 0 | DISCHARGE

## 2022-02-04 RX ORDER — CARVEDILOL PHOSPHATE 80 MG/1
1 CAPSULE, EXTENDED RELEASE ORAL
Qty: 60 | Refills: 0
Start: 2022-02-04 | End: 2022-03-05

## 2022-02-04 RX ADMIN — FINASTERIDE 5 MILLIGRAM(S): 5 TABLET, FILM COATED ORAL at 10:13

## 2022-02-04 RX ADMIN — TICAGRELOR 90 MILLIGRAM(S): 90 TABLET ORAL at 10:12

## 2022-02-04 RX ADMIN — EZETIMIBE 10 MILLIGRAM(S): 10 TABLET ORAL at 10:13

## 2022-02-04 RX ADMIN — Medication 100 MILLIGRAM(S): at 10:13

## 2022-02-04 RX ADMIN — CARVEDILOL PHOSPHATE 6.25 MILLIGRAM(S): 80 CAPSULE, EXTENDED RELEASE ORAL at 10:13

## 2022-02-04 RX ADMIN — Medication 20 MILLIGRAM(S): at 10:12

## 2022-02-04 RX ADMIN — Medication 5 MILLIGRAM(S): at 10:13

## 2022-02-04 RX ADMIN — CILOSTAZOL 100 MILLIGRAM(S): 100 TABLET ORAL at 10:12

## 2022-02-04 NOTE — DISCHARGE NOTE NURSING/CASE MANAGEMENT/SOCIAL WORK - NSDCPEFALRISK_GEN_ALL_CORE
For information on Fall & Injury Prevention, visit: [email or web address] OR  [email or web address] OR  https://www.cdc.gov/steadi/patient.html

## 2022-02-04 NOTE — PROGRESS NOTE ADULT - SUBJECTIVE AND OBJECTIVE BOX
Cath Lab Nurse Practitioner Note  HPI:  72 yo M with pmh CAD/MI 5/2020 s/p stent to RCA on plavix only due to aspirin allergy s/p aspirin desensitization in 5/2020, however he subsequent lip swelling post desensitization and contraindicated to take aspirin,  myalgias with crestor and lipitor now on pravastain presents with crushing left sided chest pain that began suddenly last night. States that it feels like his last MI. Associated belching and indigestion.   pt referred to cardiac cath , found to be NSTEMI. LHC with possible PCI    s/p LHC : JAH x 1 to OM1  Pt denies chest pain/SOB/palpitations post cath.    INTERVAL/OVERNIGHT EVENTS  No overnight events    Vital Signs Last 24 Hrs  T(C): 36.5 (04 Feb 2022 08:50), Max: 36.5 (04 Feb 2022 08:50)  T(F): 97.7 (04 Feb 2022 08:50), Max: 97.7 (04 Feb 2022 08:50)  HR: 80 (04 Feb 2022 05:00) (69 - 95)  BP: 120/59 (04 Feb 2022 05:00) (120/59 - 175/83)  BP(mean): 74 (04 Feb 2022 05:00) (74 - 119)  RR: 18 (04 Feb 2022 01:00) (16 - 23)  SpO2: 94% (04 Feb 2022 05:00) (90% - 99%)    PHYSICAL EXAM:  NEURO: Non-focal, AxOx3.  No neuro deficits NECK: Supple, No JVD, No LAD  CHEST/LUNG: Clear to auscultation bilaterally; No wheeze  HEART: s1 s2 Regular rate and rhythm; No murmurs, rubs, or gallops  ABDOMEN: Soft, Nontender, Nondistended; Bowel sounds present X 4 quadrants   EXTREMITIES:  2+ Peripheral Pulses, No clubbing, cyanosis, or edema   VASCULAR: Peripheral pulses palpable 2+ bilaterally  PROCEDURE SITE: right band removed site with edema, firm,  Sensation and KIRSTY intact. Distal pulses palpable 2+, capillary refill < 2 seconds. Patient denies pain, numbness, tingling, CP or SOB. Clean dry dressing applied    Labs:                        15.0   9.32  )-----------( 175      ( 04 Feb 2022 06:54 )             44.8     02-04    138  |  106  |  17  ----------------------------<  124<H>  3.5   |  26  |  0.89    Ca    8.9      04 Feb 2022 06:54    TPro  7.7  /  Alb  3.5  /  TBili  1.0  /  DBili  x   /  AST  37  /  ALT  33  /  AlkPhos  78  02-03    PT/INR - ( 03 Feb 2022 11:20 )   PT: 12.5 sec;   INR: 1.08 ratio         PTT - ( 03 Feb 2022 11:20 )  PTT:36.6 sec  MEDICATIONS  (STANDING):  allopurinol 100 milliGRAM(s) Oral daily  carvedilol 6.25 milliGRAM(s) Oral every 12 hours  cilostazol 100 milliGRAM(s) Oral two times a day  ezetimibe 10 milliGRAM(s) Oral daily  finasteride 5 milliGRAM(s) Oral daily  FLUoxetine 20 milliGRAM(s) Oral daily  heparin  Infusion.  Unit(s)/Hr (19 mL/Hr) IV Continuous <Continuous>  oxybutynin 5 milliGRAM(s) Oral daily  oxybutynin 5 milliGRAM(s) Oral at bedtime  pravastatin 80 milliGRAM(s) Oral at bedtime  sodium chloride 0.9%. 1000 milliLiter(s) (50 mL/Hr) IV Continuous <Continuous>  ticagrelor 90 milliGRAM(s) Oral every 12 hours      RADIOLOGY     EKG NSR rate 97    A/P : CAD  -encourage po hydration  -medical Mx for CAD  -Brilinta script sent to pharmacy with 11 refills  -Cilastazol script sent to pharmacy with 11 refills   - Pt refused cardiac rehab at this time.   -Post cath instructions reviewed, patient verbalizes and understands instructions  - ok d/c home today  -f/u with cardiology in 1-2 weeks  -Plan discussed with pt/Dr. Yun and Dr. Stroud    Cath Lab Nurse Practitioner Note  HPI:  70 yo M with pmh CAD/MI 5/2020 s/p stent to RCA on plavix only due to aspirin allergy s/p aspirin desensitization in 5/2020, however he subsequent lip swelling post desensitization and contraindicated to take aspirin,  myalgias with crestor and lipitor now on pravastain presents with crushing left sided chest pain that began suddenly last night. States that it feels like his last MI. Associated belching and indigestion.   pt referred to cardiac cath , found to be NSTEMI. LHC with possible PCI    s/p LHC : JAH x 1 to OM1  Pt denies chest pain/SOB/palpitations post cath.    INTERVAL/OVERNIGHT EVENTS  No overnight events    Vital Signs Last 24 Hrs  T(C): 36.5 (04 Feb 2022 08:50), Max: 36.5 (04 Feb 2022 08:50)  T(F): 97.7 (04 Feb 2022 08:50), Max: 97.7 (04 Feb 2022 08:50)  HR: 80 (04 Feb 2022 05:00) (69 - 95)  BP: 120/59 (04 Feb 2022 05:00) (120/59 - 175/83)  BP(mean): 74 (04 Feb 2022 05:00) (74 - 119)  RR: 18 (04 Feb 2022 01:00) (16 - 23)  SpO2: 94% (04 Feb 2022 05:00) (90% - 99%)    PHYSICAL EXAM:  NEURO: Non-focal, AxOx3.  No neuro deficits NECK: Supple, No JVD, No LAD  CHEST/LUNG: Clear to auscultation bilaterally; No wheeze  HEART: s1 s2 Regular rate and rhythm; No murmurs, rubs, or gallops  ABDOMEN: Soft, Nontender, Nondistended; Bowel sounds present X 4 quadrants   EXTREMITIES:  2+ Peripheral Pulses, No clubbing, cyanosis, or edema   VASCULAR: Peripheral pulses palpable 2+ bilaterally  PROCEDURE SITE: right band removed site with edema, firm,  Sensation and KIRSTY intact. Distal pulses palpable 2+, capillary refill < 2 seconds. Patient denies pain, numbness, tingling, CP or SOB. Clean dry dressing applied    Labs:                        15.0   9.32  )-----------( 175      ( 04 Feb 2022 06:54 )             44.8     02-04    138  |  106  |  17  ----------------------------<  124<H>  3.5   |  26  |  0.89    Ca    8.9      04 Feb 2022 06:54    TPro  7.7  /  Alb  3.5  /  TBili  1.0  /  DBili  x   /  AST  37  /  ALT  33  /  AlkPhos  78  02-03    PT/INR - ( 03 Feb 2022 11:20 )   PT: 12.5 sec;   INR: 1.08 ratio         PTT - ( 03 Feb 2022 11:20 )  PTT:36.6 sec  MEDICATIONS  (STANDING):  allopurinol 100 milliGRAM(s) Oral daily  carvedilol 6.25 milliGRAM(s) Oral every 12 hours  cilostazol 100 milliGRAM(s) Oral two times a day  ezetimibe 10 milliGRAM(s) Oral daily  finasteride 5 milliGRAM(s) Oral daily  FLUoxetine 20 milliGRAM(s) Oral daily  heparin  Infusion.  Unit(s)/Hr (19 mL/Hr) IV Continuous <Continuous>  oxybutynin 5 milliGRAM(s) Oral daily  oxybutynin 5 milliGRAM(s) Oral at bedtime  pravastatin 80 milliGRAM(s) Oral at bedtime  sodium chloride 0.9%. 1000 milliLiter(s) (50 mL/Hr) IV Continuous <Continuous>  ticagrelor 90 milliGRAM(s) Oral every 12 hours      RADIOLOGY   < from: US Duplex Arterial Upper Ext Ltd, Right (02.04.22 @ 10:03) >  FINDINGS: Duplex evaluation of the right radial artery demonstrates   diffuse vascular calcification. The artery is patent. Normal triphasic   waveforms are present with peak systolic velocity of 125 cm/s. No   pseudoaneurysm, AV fistula or soft tissue hematoma is seen.    IMPRESSION: No duplex evidence of right radial artery injury.    --- End of Report ---    < end of copied text >    Pt's RUE improving slowly. Pt denies increased pain. instructions to return to hospital should edema or pain worsen given.     EKG NSR rate 97    A/P : CAD  -encourage po hydration  -medical Mx for CAD  -Brilinta script sent to pharmacy with 11 refills  -Cilastazol script sent to pharmacy with 11 refills   - Pt refused cardiac rehab at this time.   -Post cath instructions reviewed, patient verbalizes and understands instructions  - ok d/c home today  -f/u with cardiology in 1-2 weeks  -Plan discussed with pt/Dr. Yun and Dr. Stroud

## 2022-02-04 NOTE — DISCHARGE NOTE PROVIDER - NSDCCPCAREPLAN_GEN_ALL_CORE_FT
PRINCIPAL DISCHARGE DIAGNOSIS  Diagnosis: NSTEMI (non-ST elevation myocardial infarction)  Assessment and Plan of Treatment:

## 2022-02-04 NOTE — DISCHARGE NOTE PROVIDER - NSDCMRMEDTOKEN_GEN_ALL_CORE_FT
allopurinol 100 mg oral tablet: 1 tab(s) orally once a day  carvedilol 3.125 mg oral tablet: 1 tab(s) orally 2 times a day  ezetimibe 10 mg oral tablet: 1 tab(s) orally once a day  finasteride 5 mg oral tablet: 1 tab(s) orally once a day  FLUoxetine 20 mg oral capsule: 1 cap(s) orally once a day  oxybutynin 5 mg oral tablet: 2 tab(s) orally once a day (in the morning)  oxybutynin 5 mg oral tablet: 1 tab(s) orally once a day (at bedtime)  pravastatin 80 mg oral tablet: 1 tab(s) orally once a day

## 2022-02-04 NOTE — DISCHARGE NOTE NURSING/CASE MANAGEMENT/SOCIAL WORK - PATIENT PORTAL LINK FT
You can access the FollowMyHealth Patient Portal offered by [facility] by registering at the following website: [email or web address]. By joining [facility]’s FollowMyHealth portal, you will also be able to view your health information using other applications (apps) compatible with our system.

## 2022-02-04 NOTE — PROGRESS NOTE ADULT - SUBJECTIVE AND OBJECTIVE BOX
PCP:    REQUESTING PHYSICIAN:    REASON FOR CONSULT:    CHIEF COMPLAINT:    HPI:  71 y/o w/    *NSTEMI-s/p PCI RCA x 2 May '20  *possible ASA allergy,  *Mitral valve repair  (R minithoracotomy)  *HLP, BARRERA, kidney stones    presented to ED w/ chest discomfort.  Onset yesterday while at rest, vague indigestion type discomfort,  not related to exertion but very similar to symptoms prior to NSTEMI  May '20 but less severe.  Symptoms were intermittent  lasting minutes during episodes - as they persisted  he came to ED for further evaluation.    No dyspnea, palpitations, syncope, lightheadness  or other cardiac symptoms.    ECG w/ NSR, Q waves inferiorly, borderline ST depressions   V2, V3 compared w/ prior ECGs.    Currently is comfortable w/ no chest discomfort.  Initial trop 2500.    Reviewed w/ him history of possible ASA allergy.  Prior to cath in , pt mentioned he was swelling   underneath tongue for months after being on ASA for years,   D/janelle 1 month prior to cath w/ resolution of symptoms.  After cath had ASA desensitization protocol  Pt called 2 days after cath w/ lip swelling & ASA d/janelle   reduced dose xarelto started.  later changed to cilostazol on Oct '20  due to bruising.  22: Pt feels improved without chest pain.     PAST MEDICAL & SURGICAL HISTORY:  Cerebrovascular accident (CVA)  H/O mitral valve repair    Allergies  aspirin (Angioedema (Mild))  Intolerances      SOCIAL HISTORY: smokes cigars, casual ETOH.    FAMILY HISTORY:  FH: CAD (coronary artery disease)   (2022 14:32)      PAST MEDICAL & SURGICAL HISTORY:  BPH (benign prostatic hyperplasia)    HTN (hypertension)    Stroke      CAD, multiple vessel  with 2 stents 2020    HLD (hyperlipidemia)    Hematuria    Bladder stones    Sleep apnea  uses cpap    H/O mitral valve repair      S/P bilateral hip replacements   &amp;     Elective surgery  cardiac stents 2020        SOCIAL HISTORY:    FAMILY HISTORY:  FH: CAD (coronary artery disease)        ALLERGIES:  Allergies    aspirin (Angioedema (Mild))    Intolerances        MEDICATIONS:    MEDICATIONS  (STANDING):  allopurinol 100 milliGRAM(s) Oral daily  carvedilol 6.25 milliGRAM(s) Oral every 12 hours  cilostazol 100 milliGRAM(s) Oral two times a day  ezetimibe 10 milliGRAM(s) Oral daily  finasteride 5 milliGRAM(s) Oral daily  FLUoxetine 20 milliGRAM(s) Oral daily  heparin  Infusion.  Unit(s)/Hr (19 mL/Hr) IV Continuous <Continuous>  oxybutynin 5 milliGRAM(s) Oral daily  oxybutynin 5 milliGRAM(s) Oral at bedtime  pravastatin 80 milliGRAM(s) Oral at bedtime  sodium chloride 0.9%. 1000 milliLiter(s) (50 mL/Hr) IV Continuous <Continuous>  ticagrelor 90 milliGRAM(s) Oral every 12 hours    MEDICATIONS  (PRN):  heparin   Injectable 9000 Unit(s) IV Push every 6 hours PRN For aPTT less than 40  heparin   Injectable 4000 Unit(s) IV Push every 6 hours PRN For aPTT between 40 - 57  nitroglycerin     SubLingual 0.4 milliGRAM(s) SubLingual every 5 minutes PRN Chest Pain      All other review of systems is negative unless indicated above    Vital Signs Last 24 Hrs  T(C): 36.5 (2022 08:50), Max: 36.5 (2022 08:50)  T(F): 97.7 (2022 08:50), Max: 97.7 (2022 08:50)  HR: 80 (2022 05:00) (69 - 95)  BP: 120/59 (2022 05:00) (120/59 - 175/83)  BP(mean): 74 (2022 05:00) (74 - 119)  RR: 18 (2022 01:00) (16 - 23)  SpO2: 94% (2022 05:00) (90% - 99%)Daily     Daily Weight in k.6 (2022 06:08)I&O's Summary    2022 07:01  -  2022 07:00  --------------------------------------------------------  IN: 450 mL / OUT: 750 mL / NET: -300 mL    2022 07:01  -  2022 11:20  --------------------------------------------------------  IN: 0 mL / OUT: 525 mL / NET: -525 mL        PHYSICAL EXAM:    Constitutional: NAD, awake and alert, well-developed  HEENT: PERR, EOMI,  No oral cyananosis.  Neck:  supple,  No JVD  Respiratory: Breath sounds are clear bilaterally, No wheezing, rales or rhonchi  Cardiovascular: S1 and S2, regular rate and rhythm, no Murmurs, gallops or rubs  Gastrointestinal: Bowel Sounds present, soft, nontender.   Extremities: Right upper extremity site appears ok.   Vascular: 2+ peripheral pulses  Neurological: A/O x 3, no focal deficits  Musculoskeletal: no calf tenderness.  Skin: No rashes.      LABS: All Labs Reviewed:                        15.0   9.32  )-----------( 175      ( 2022 06:54 )             44.8                         15.9   9.13  )-----------( 179      ( 2022 11:20 )             46.8     2022 06:54    138    |  106    |  17     ----------------------------<  124    3.5     |  26     |  0.89   2022 11:20    137    |  105    |  19     ----------------------------<  165    3.5     |  27     |  0.93     Ca    8.9        2022 06:54  Ca    9.0        2022 11:20    TPro  7.7    /  Alb  3.5    /  TBili  1.0    /  DBili  x      /  AST  37     /  ALT  33     /  AlkPhos  78     2022 11:20    PT/INR - ( 2022 11:20 )   PT: 12.5 sec;   INR: 1.08 ratio         PTT - ( 2022 11:20 )  PTT:36.6 sec      Blood Culture:         RADIOLOGY/EKG:< from: 12 Lead ECG (22 @ 10:22) >    Diagnosis Line Normal sinus rhythm  Possible Left atrial enlargement  Left ventricular hypertrophy  Possible Inferior infarct (cited on or before 18-MAY-2020)  Abnormal ECG  When compared with ECG of 2020 15:46,  T wave inversion no longer evident in Inferior leads  NonspecificT wave abnormality now evident in Lateral leads  Confirmed by LOBO HOLMAN MD ([id number]) on 2/3/2022 9:36:58 PM    < end of copied text >        ECHO/CARDIAC CATHTERIZATION/STRESS TEST:  < from: Cardiac Catheterization (22 @ 18:58) >   Interventional Conclusions     Successful JAH placement to ostial OM1 lesion resulting in LISA 3 flow   and   no chest pain at the end of the procedure.     Recommendations     Maintain on ticagrelor (180 mg load in lad) with maintenance 90 mg bid,   with cilostazol 100 mg bid which patient was on in the past. Patient with   aspirin allergy with failed desensitisation in the past. Recommend   aggressive medical management of CAD as per ACC/AHA guidelines, including   lifestyle changes.    Estimated Blood Loss:5ml.    Complications:  No complication.     Signatures     ----------------------------------------------------------------   Electronically signed by MD Hair Yun(Performing Physician)   on 2022 20:38   ----------------------------------------------------------------        < end of copied text >

## 2022-02-04 NOTE — PROGRESS NOTE ADULT - ATTENDING COMMENTS
Agree with above. No pathology on duplex. No further chest pain after PCI to OM1 in setting of NSTEMI. Patient to follow up closely with their cardiologist.
Agree with above. Critical OM1 lesion of large territory as culprit for patient's presentation, now s/p JAH. Loaded with ticagrelor 180 mg in the lab. Needs to be on Cilostazol 100 mg BID as he was in the past to maintain stent patency in setting of aspirin allergy. Monitor closely in CICU.

## 2022-02-04 NOTE — PROGRESS NOTE ADULT - TIME BILLING
Differential diagnosis and plan of care discussed with patient after the evaluation.   Counseling on diet, exercise, and medication compliance was done.  Counseling on smoking and alcohol cessation was offered when appropriate.  Pain assessed and judicious use of narcotics when appropriate was discussed.  Importance of fall prevention discussed.  Advanced care planning was discussed with patient and family.
Differential diagnosis and plan of care discussed with patient after the evaluation.   Counseling on diet, exercise, and medication compliance was done.  Counseling on smoking and alcohol cessation was offered when appropriate.  Pain assessed and judicious use of narcotics when appropriate was discussed.  Importance of fall prevention discussed.  Advanced care planning was discussed with patient and family.

## 2022-02-04 NOTE — DISCHARGE NOTE PROVIDER - CARE PROVIDER_API CALL
Edwar Wilson (MD)  Cardiovascular Disease  [address], [address]  [place] [address]  Phone: [phone]  Fax: [phone]  Follow Up Time:

## 2022-02-04 NOTE — DISCHARGE NOTE PROVIDER - HOSPITAL COURSE
Patient is a 71y old  Male who presents with a chief complaint of cp (04 Feb 2022 11:19)      SUBJECTIVE:   HPI:  70 yo M with pmh CAD/MI 5/2020 s/p stent to RCA on plavix only due to aspirin allergy s/p aspirin desensitization in 5/2020, however he subsequent lip swelling post desensitization and contraindicated to take aspirin,  myalgias with crestor and lipitor now on pravastain presents with crushing left sided chest pain that began suddenly last night. States that it feels like his last MI. Associated belching and indigestion.   No N/V/F/C. No SOB. No abd pain. No h/o VTE. Had covid 12/21    Ed course: Plavix loaded. Heparin gtt started    PAST MEDICAL & SURGICAL HISTORY:  Cerebrovascular accident (CVA)  H/O mitral valve repair    Allergies  aspirin (Angioedema (Mild))  Intolerances      SOCIAL HISTORY: smokes cigars, casual ETOH.    FAMILY HISTORY:  FH: CAD (coronary artery disease)      Vital Signs Last 24 Hrs  T(C): 36.1 (04 Feb 2022 12:40), Max: 36.5 (04 Feb 2022 08:50)  T(F): 97 (04 Feb 2022 12:40), Max: 97.7 (04 Feb 2022 08:50)  HR: 80 (04 Feb 2022 05:00) (69 - 95)  BP: 120/59 (04 Feb 2022 05:00) (120/59 - 175/83)  BP(mean): 74 (04 Feb 2022 05:00) (74 - 119)  RR: 18 (04 Feb 2022 01:00) (16 - 23)  SpO2: 94% (04 Feb 2022 05:00) (90% - 99%)      PHYSICAL EXAM:    Constitutional: NAD, awake and alert,   HEENT: PERR, EOMI, Normal Hearing, MMM  Neck: Soft and supple, No LAD, No JVD  Respiratory: Breath sounds are clear bilaterally, No wheezing, rales or rhonchi  Cardiovascular: S1 and S2, regular rate and rhythm, no Murmurs, gallops or rubs  Gastrointestinal: Bowel Sounds present, soft, nontender, nondistended, no guarding, no rebound  Extremities: No peripheral edema  Vascular: 2+ peripheral pulses  Neurological: A/O x 3, no focal deficits  Musculoskeletal: 5/5 strength b/l upper and lower extremities  Skin: No rashes      #NSTEMI  -admit to CICU  -Plavix loaded in ED  -d/w Dr Ortiz, ASA remains contraindicated and has not been successfully desensitized  -Cont Pravastatin  -DC on brillinta/cilostozal. No ASA  -cont statin  -2D echo (echo 5/2020: Nl LV function)  -coreg 3.125 bid  -stop thiazide  -f/u cardiology o/p    dc time: 49 min      #H/o CVA/MV repair/HTN/HLD  -resuem home meds

## 2022-02-07 RX ORDER — DOXYCYCLINE 100 MG/1
100 TABLET, FILM COATED ORAL
Qty: 20 | Refills: 0 | Status: DISCONTINUED | COMMUNITY
Start: 2020-12-03 | End: 2022-02-07

## 2022-02-07 NOTE — CHART NOTE - NSCHARTNOTEFT_GEN_A_CORE
KASSY [id number]      [id number]    02-07-22 @ 12:56    Answer : yes    Bleeding: no    Infection: no    Pain: no    Prescriptions: brilinta picked up with no complications      Follow up appointment: Dr Welch 02/10/2022      Action taken:    none needed
Pt c/o RUE pain upon movement. Forearm edema noted s/p PCI radial access. Urgent RUE doppler ordered.
70 y/o male with PMH CAD ( stent), HLD, HTN, Gout. presented to ED with c/o intermittent chest discomfort. Pt brought to cath lab for cardiac cath with possible PCI who  noted to be NSTE-ACS with trop peak at 3429.52  Seen and examined Pt in holding.   VSS, A+O x4, c/o chest discomfort  Informed consent obtained for cardiac cath, all questions were answered.     ASA class:  II  Cr:  0.93  GFR: 83   Bleeding risk: 1.0%

## 2022-02-09 DIAGNOSIS — Z86.16 PERSONAL HISTORY OF COVID-19: ICD-10-CM
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
## 2022-02-09 DIAGNOSIS — E78.5 HYPERLIPIDEMIA, UNSPECIFIED: ICD-10-CM

## 2022-02-09 DIAGNOSIS — F17.290 NICOTINE DEPENDENCE, OTHER TOBACCO PRODUCT, UNCOMPLICATED: ICD-10-CM

## 2022-02-09 DIAGNOSIS — Z88.6 ALLERGY STATUS TO ANALGESIC AGENT: ICD-10-CM

## 2022-02-09 DIAGNOSIS — N40.0 BENIGN PROSTATIC HYPERPLASIA WITHOUT LOWER URINARY TRACT SYMPTOMS: ICD-10-CM

## 2022-02-09 DIAGNOSIS — Z99.89 DEPENDENCE ON OTHER ENABLING MACHINES AND DEVICES: ICD-10-CM

## 2022-02-09 DIAGNOSIS — I25.10 ATHEROSCLEROTIC HEART DISEASE OF NATIVE CORONARY ARTERY WITHOUT ANGINA PECTORIS: ICD-10-CM
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
## 2022-02-09 DIAGNOSIS — I25.2 OLD MYOCARDIAL INFARCTION: ICD-10-CM

## 2022-02-09 DIAGNOSIS — I21.4 NON-ST ELEVATION (NSTEMI) MYOCARDIAL INFARCTION: ICD-10-CM

## 2022-02-09 DIAGNOSIS — Z79.02 LONG TERM (CURRENT) USE OF ANTITHROMBOTICS/ANTIPLATELETS: ICD-10-CM

## 2022-02-09 DIAGNOSIS — Z96.643 PRESENCE OF ARTIFICIAL HIP JOINT, BILATERAL: ICD-10-CM

## 2022-02-09 DIAGNOSIS — Z82.49 FAMILY HISTORY OF ISCHEMIC HEART DISEASE AND OTHER DISEASES OF THE CIRCULATORY SYSTEM: ICD-10-CM

## 2022-02-09 DIAGNOSIS — T82.855A STENOSIS OF CORONARY ARTERY STENT, INITIAL ENCOUNTER: ICD-10-CM

## 2022-02-09 DIAGNOSIS — Z95.5 PRESENCE OF CORONARY ANGIOPLASTY IMPLANT AND GRAFT: ICD-10-CM

## 2022-02-09 DIAGNOSIS — G47.33 OBSTRUCTIVE SLEEP APNEA (ADULT) (PEDIATRIC): ICD-10-CM

## 2022-02-10 ENCOUNTER — NON-APPOINTMENT (OUTPATIENT)
Age: 72
End: 2022-02-10

## 2022-02-10 ENCOUNTER — APPOINTMENT (OUTPATIENT)
Dept: CARDIOLOGY | Facility: CLINIC | Age: 72
End: 2022-02-10
Payer: MEDICARE

## 2022-02-10 VITALS
OXYGEN SATURATION: 98 % | WEIGHT: 234 LBS | RESPIRATION RATE: 16 BRPM | HEIGHT: 72 IN | SYSTOLIC BLOOD PRESSURE: 144 MMHG | DIASTOLIC BLOOD PRESSURE: 94 MMHG | BODY MASS INDEX: 31.69 KG/M2 | HEART RATE: 76 BPM

## 2022-02-10 PROCEDURE — 93000 ELECTROCARDIOGRAM COMPLETE: CPT

## 2022-02-10 PROCEDURE — 99215 OFFICE O/P EST HI 40 MIN: CPT

## 2022-02-10 RX ORDER — PRAVASTATIN SODIUM 80 MG/1
80 TABLET ORAL DAILY
Qty: 90 | Refills: 1 | Status: DISCONTINUED | COMMUNITY
Start: 1900-01-01 | End: 2022-02-10

## 2022-02-10 RX ORDER — TADALAFIL 5 MG/1
5 TABLET ORAL
Refills: 0 | Status: DISCONTINUED | COMMUNITY
End: 2022-02-10

## 2022-02-10 RX ORDER — SILDENAFIL 20 MG/1
20 TABLET ORAL
Qty: 90 | Refills: 0 | Status: DISCONTINUED | COMMUNITY
Start: 2021-06-16 | End: 2022-02-10

## 2022-02-10 RX ORDER — TAMSULOSIN HYDROCHLORIDE 0.4 MG/1
0.4 CAPSULE ORAL
Qty: 90 | Refills: 3 | Status: DISCONTINUED | COMMUNITY
End: 2022-02-10

## 2022-02-10 RX ORDER — CLOPIDOGREL BISULFATE 75 MG/1
75 TABLET, FILM COATED ORAL DAILY
Qty: 1 | Refills: 0 | Status: DISCONTINUED | COMMUNITY
Start: 2020-11-19 | End: 2022-02-10

## 2022-02-10 NOTE — HISTORY OF PRESENT ILLNESS
[FreeTextEntry1] : 70 y/o\par \par *NSTEMI-s/p PCI OM1 Feb '21, s/p PCI RCA May '20\par *h/o ASA allergy v. mastocytosis?-unresponsive to ASA\par desensitization, on cilostazol \par \par here for followup postPCI.\par Bruising at R radial access site throughout forarm\par but pulses good & no pain.\par \par discussed multiple ways to reduce future CV events.\par \par EKG:  NSR, LAE, Old inferior MI. \par Echo: 5/2020, LAE, MV repair with +1 MR, no pericardial effusion., normal LV function LVEF 55-60%. \par Cardiac Cath: 5/2020, 1 Vessel CAD with inferior hypokinesis. \par Stent: 5/2020, JAH to RCA

## 2022-02-10 NOTE — ASSESSMENT
[FreeTextEntry1] : A/P:\par \par letter to employer clearing for work will be written today.\par \par Will d/c pravastatin, start crestor pt states he had\par reaction to lipitor hasn't tried crestor.\par \par will investigate PCSK9 candidacy after visit.\par \par SBPs 140s increased coreg dose.\par \par diet handouts given\par \par return in 1 week\par \par ==========\par Note to employer,\par \par This pt had a myocardial infarction and was treated at\[facility] for this on February 3rd & February 4th.\par It is acceptable from a cardiac standpoint\par for him to resume his work as a  \par 1 week after his cardiac event.\par Please contact me with any questions.\par \par Elie Christiansen M.D.\par Cardiologist.\par [facility].\par \par

## 2022-02-15 ENCOUNTER — NON-APPOINTMENT (OUTPATIENT)
Age: 72
End: 2022-02-15

## 2022-02-18 ENCOUNTER — NON-APPOINTMENT (OUTPATIENT)
Age: 72
End: 2022-02-18

## 2022-02-18 ENCOUNTER — APPOINTMENT (OUTPATIENT)
Dept: CARDIOLOGY | Facility: CLINIC | Age: 72
End: 2022-02-18
Payer: MEDICARE

## 2022-02-18 VITALS
WEIGHT: 235 LBS | HEIGHT: 72 IN | DIASTOLIC BLOOD PRESSURE: 80 MMHG | OXYGEN SATURATION: 94 % | SYSTOLIC BLOOD PRESSURE: 166 MMHG | BODY MASS INDEX: 31.83 KG/M2 | HEART RATE: 77 BPM

## 2022-02-18 PROCEDURE — 93000 ELECTROCARDIOGRAM COMPLETE: CPT

## 2022-02-18 PROCEDURE — 99214 OFFICE O/P EST MOD 30 MIN: CPT

## 2022-02-28 NOTE — HISTORY OF PRESENT ILLNESS
[FreeTextEntry1] : 72 y/o\par \par *NSTEMI-s/p PCI OM1 Feb '21, s/p PCI RCA May '20\par *h/o ASA allergy v. mastocytosis?-unresponsive to ASA\par desensitization, on cilostazol \par \par Reviewed LDL today 80s\par Tolerating lipitor without problems.\par was given this instead of crestor.\par Has PCSK9 from pharmacy but has not started yet\par as he was waiting for instructions on how to administer this.\par \par Discussed changing to crestor 20 mg qhs.\par Will recheck lipid panel in 6-8 weeks prior to next visit.\par \par SBPs 160s at home 130-140s\par HRs 70\par increased coreg from 6.25 to 12.5 \par gave holding parameters SBP <90, HR <50\par \par Discussed weight loss 20 pd goal.\par Read AHA/ACC literature I provided.\par

## 2022-02-28 NOTE — ASSESSMENT
[FreeTextEntry1] : A/P:\par \par Hold off in initiation of PCSK9 as pt is tolerating statin.\par change atorvastatin to rosuvatstin.\par \par Return in 6-8 weeks\par w/ FLP & direct LDL b/f next visit.

## 2022-03-08 ENCOUNTER — RX CHANGE (OUTPATIENT)
Age: 72
End: 2022-03-08

## 2022-03-11 ENCOUNTER — RX RENEWAL (OUTPATIENT)
Age: 72
End: 2022-03-11

## 2022-03-15 ENCOUNTER — RX CHANGE (OUTPATIENT)
Age: 72
End: 2022-03-15

## 2022-03-15 ENCOUNTER — NON-APPOINTMENT (OUTPATIENT)
Age: 72
End: 2022-03-15

## 2022-03-22 ENCOUNTER — TRANSCRIPTION ENCOUNTER (OUTPATIENT)
Age: 72
End: 2022-03-22

## 2022-04-05 ENCOUNTER — APPOINTMENT (OUTPATIENT)
Dept: CARDIOLOGY | Facility: CLINIC | Age: 72
End: 2022-04-05
Payer: MEDICARE

## 2022-04-05 ENCOUNTER — NON-APPOINTMENT (OUTPATIENT)
Age: 72
End: 2022-04-05

## 2022-04-05 VITALS
HEART RATE: 75 BPM | DIASTOLIC BLOOD PRESSURE: 62 MMHG | SYSTOLIC BLOOD PRESSURE: 130 MMHG | HEIGHT: 72 IN | WEIGHT: 243 LBS | BODY MASS INDEX: 32.91 KG/M2 | OXYGEN SATURATION: 96 %

## 2022-04-05 DIAGNOSIS — R06.00 DYSPNEA, UNSPECIFIED: ICD-10-CM

## 2022-04-05 PROCEDURE — 99215 OFFICE O/P EST HI 40 MIN: CPT

## 2022-04-05 PROCEDURE — 93000 ELECTROCARDIOGRAM COMPLETE: CPT

## 2022-04-05 RX ORDER — HYDROCHLOROTHIAZIDE 25 MG/1
25 TABLET ORAL DAILY
Qty: 90 | Refills: 2 | Status: DISCONTINUED | COMMUNITY
Start: 2021-11-06 | End: 2022-04-05

## 2022-04-08 RX ORDER — CILOSTAZOL 100 MG/1
100 TABLET ORAL TWICE DAILY
Qty: 180 | Refills: 1 | Status: DISCONTINUED | COMMUNITY
Start: 1900-01-01 | End: 2022-04-08

## 2022-04-11 LAB
CHOLEST SERPL-MCNC: 133 MG/DL
HDLC SERPL-MCNC: 53 MG/DL
LDLC SERPL CALC-MCNC: 64 MG/DL
LDLC SERPL DIRECT ASSAY-MCNC: 68 MG/DL
NONHDLC SERPL-MCNC: 80 MG/DL
TRIGL SERPL-MCNC: 79 MG/DL

## 2022-04-14 ENCOUNTER — NON-APPOINTMENT (OUTPATIENT)
Age: 72
End: 2022-04-14

## 2022-04-18 ENCOUNTER — RX CHANGE (OUTPATIENT)
Age: 72
End: 2022-04-18

## 2022-04-21 ENCOUNTER — NON-APPOINTMENT (OUTPATIENT)
Age: 72
End: 2022-04-21

## 2022-04-22 ENCOUNTER — RESULT REVIEW (OUTPATIENT)
Age: 72
End: 2022-04-22

## 2022-04-22 ENCOUNTER — OUTPATIENT (OUTPATIENT)
Dept: OUTPATIENT SERVICES | Facility: HOSPITAL | Age: 72
LOS: 1 days | End: 2022-04-22
Payer: MEDICARE

## 2022-04-22 DIAGNOSIS — R06.00 DYSPNEA, UNSPECIFIED: ICD-10-CM

## 2022-04-22 DIAGNOSIS — Z41.9 ENCOUNTER FOR PROCEDURE FOR PURPOSES OTHER THAN REMEDYING HEALTH STATE, UNSPECIFIED: Chronic | ICD-10-CM

## 2022-04-22 DIAGNOSIS — Z98.890 OTHER SPECIFIED POSTPROCEDURAL STATES: Chronic | ICD-10-CM

## 2022-04-22 DIAGNOSIS — Z96.643 PRESENCE OF ARTIFICIAL HIP JOINT, BILATERAL: Chronic | ICD-10-CM

## 2022-04-22 PROCEDURE — 93016 CV STRESS TEST SUPVJ ONLY: CPT

## 2022-04-22 PROCEDURE — 78452 HT MUSCLE IMAGE SPECT MULT: CPT | Mod: 26

## 2022-04-22 PROCEDURE — A9500: CPT

## 2022-04-22 PROCEDURE — 93017 CV STRESS TEST TRACING ONLY: CPT

## 2022-04-22 PROCEDURE — 78452 HT MUSCLE IMAGE SPECT MULT: CPT

## 2022-04-22 PROCEDURE — 93018 CV STRESS TEST I&R ONLY: CPT

## 2022-04-22 RX ORDER — OXYBUTYNIN CHLORIDE 5 MG
1 TABLET ORAL
Qty: 0 | Refills: 0 | DISCHARGE

## 2022-04-22 RX ORDER — REGADENOSON 0.08 MG/ML
0.4 INJECTION, SOLUTION INTRAVENOUS ONCE
Refills: 0 | Status: COMPLETED | OUTPATIENT
Start: 2022-04-22 | End: 2022-04-22

## 2022-04-22 RX ADMIN — REGADENOSON 0.4 MILLIGRAM(S): 0.08 INJECTION, SOLUTION INTRAVENOUS at 09:07

## 2022-04-22 NOTE — CHART NOTE - NSCHARTNOTEFT_GEN_A_CORE
Regadenoson Stress test    Lexiscan (regadenoson) 0.5mg/5mL was administered through a peripheral IV within 10 seconds followed by 5mL saline flush.  The radiotracer was then injected by the nuclear technologist and the patient was monitored with continuous ECG and 12 lead ECGs every 1 minute for 6 minutes.    Resting ECG: Sinus bradycardia  Stress ECG:  No ischemic ECG changes with administration of regadenoson.  Arrhythmia:  Rare PVC.  BP Response: Appropriate  HR Response:  Appropriate    Conclusion:  Normal ECG response to pharmacologic stress with regadenoson.  Please see separate nuclear stress report from myocardial perfusion SPECT findings.

## 2022-04-23 DIAGNOSIS — R06.00 DYSPNEA, UNSPECIFIED: ICD-10-CM

## 2022-04-25 ENCOUNTER — NON-APPOINTMENT (OUTPATIENT)
Age: 72
End: 2022-04-25

## 2022-04-25 NOTE — HISTORY OF PRESENT ILLNESS
[FreeTextEntry1] : 72 y/o\par \par 72 y/o\par \par *NSTEMI-s/p PCI OM1 Feb '21, s/p PCI RCA May '20\par *h/o possible ASA allergy v. mastocytosis?-unresponsive to ASA\par desensitization, on cilostazol \par \par needs clearance for DOT physical\par stress within 6 months - rate related LBBB right b/f peak exercise\par some difficulties w/ knee at peak xercise last test (?) \par pharm nuclear ordered.\par \par reports fatigue on BB\par reduced coreg from 12.5 back to 6.25\par d/c hctz start chlorthal (possible allergy issues)\par \par pt states that during his last visit w/\par his allergist\par gadhi abshalomov\par that he no longer is considered to have an aspirin allergry\par Of note workup was negative for angioedema.\par \par Tolerating crestor daily w/o problems.\par LDL reviewed - in 60s - goal in 1 yr postPCI = LDL <50\par \par EKG:  NSR, LAE, Old inferior MI. \par Echo: 5/2020, LAE, MV repair with +1 MR, no pericardial effusion., normal LV function LVEF 55-60%. \par \par Cath Feb '21 5/3/'20: "JAH to ostial OM1 w/ TIMI3...maintain on \par ticagrelor, 180 load in lab then 90 BID w/ cilostazol 100 BID\par (failed ASA desensitization)." LM diffuse disease,\par LAD diffuse disease, LCx OM1 99% stenosis RCA mid 60%\par RCA distal 50% (RCA stent noted)\par \par Cardiac Cath: 5/2020, 1 Vessel CAD with inferior hypokinesis. \par Stent: 5/2020, JAH to RCA \par

## 2022-04-25 NOTE — ASSESSMENT
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 Heard called asking if plain exercise can be done\par instead of nuclear as nuclear study takes time.\par I explained that his rate related LBBB may be \par an obstacle to interpretation.\par He agreed to proceed w/ nuclear scan.\par \par Reviewed scanned note by Dr. Dorian Carranza ([facility])\par & spoke w/ him.  Pt had aspirin challenge in his office which was negative.\par Discussed prior h/o of positive ASA challenge. Dr. Saldivar\par was aware of this & discussed w/ pt.  The prior positive ASA\par challenge test may have been an ACEI allergy.\par ASA challenge was at a dose of 325 mg daily.\par Mr. Heard may receive 81 mg ASA daily without concern re:\par allergic reaction.\par \par Instructed Mr. Heard to stop cilostazol this evening & \par start ASA 81 mg starting tommorrow.\par ================\par 4/25/'22:\par Stress test results reviewed.  Normal perfusion w/ normal EF\par discussed w/ pt.  He may return to work as a\par  w/ no restrictions.\par \par \par

## 2022-05-02 ENCOUNTER — RX CHANGE (OUTPATIENT)
Age: 72
End: 2022-05-02

## 2022-05-03 ENCOUNTER — APPOINTMENT (OUTPATIENT)
Dept: CARDIOLOGY | Facility: CLINIC | Age: 72
End: 2022-05-03
Payer: MEDICARE

## 2022-05-03 ENCOUNTER — NON-APPOINTMENT (OUTPATIENT)
Age: 72
End: 2022-05-03

## 2022-05-03 VITALS
DIASTOLIC BLOOD PRESSURE: 91 MMHG | HEART RATE: 67 BPM | SYSTOLIC BLOOD PRESSURE: 145 MMHG | WEIGHT: 242 LBS | HEIGHT: 72 IN | OXYGEN SATURATION: 97 % | BODY MASS INDEX: 32.78 KG/M2

## 2022-05-03 PROCEDURE — 93000 ELECTROCARDIOGRAM COMPLETE: CPT

## 2022-05-03 PROCEDURE — 99214 OFFICE O/P EST MOD 30 MIN: CPT

## 2022-05-03 RX ORDER — ASPIRIN 81 MG/1
81 TABLET ORAL DAILY
Qty: 30 | Refills: 3 | Status: ACTIVE | COMMUNITY
Start: 2022-05-03 | End: 1900-01-01

## 2022-05-17 ENCOUNTER — RX CHANGE (OUTPATIENT)
Age: 72
End: 2022-05-17

## 2022-07-20 ENCOUNTER — APPOINTMENT (OUTPATIENT)
Dept: CARDIOLOGY | Facility: CLINIC | Age: 72
End: 2022-07-20

## 2022-07-20 ENCOUNTER — NON-APPOINTMENT (OUTPATIENT)
Age: 72
End: 2022-07-20

## 2022-07-20 VITALS
WEIGHT: 238 LBS | OXYGEN SATURATION: 97 % | HEIGHT: 72 IN | DIASTOLIC BLOOD PRESSURE: 85 MMHG | BODY MASS INDEX: 32.23 KG/M2 | HEART RATE: 67 BPM | RESPIRATION RATE: 16 BRPM | SYSTOLIC BLOOD PRESSURE: 164 MMHG

## 2022-07-20 PROCEDURE — 93000 ELECTROCARDIOGRAM COMPLETE: CPT

## 2022-07-20 PROCEDURE — 99214 OFFICE O/P EST MOD 30 MIN: CPT

## 2022-07-20 NOTE — ASSESSMENT
[FreeTextEntry1] : A/P:\par \par -Recheck FLP on 40 of crestor - pt will contact me\par  2 days after FLP via cell phone to review results.\par -cont. coreg 12.5 in AM (25 causes drowsiness)\par 25 in PM for HTN.\par \par Return 4 months.

## 2022-07-20 NOTE — ASSESSMENT
[FreeTextEntry1] : A/P\par \par increase rosuva from 20 to 40\par increase coreg 12.5 to 25 BID\par \par basic chem today on chlorthal.\par \par Return in 6 weeks FLP b/f next visit.

## 2022-07-20 NOTE — HISTORY OF PRESENT ILLNESS
[FreeTextEntry1] : 70 y/o\par \par *NSTEMI-s/p PCI OM1 Feb '21, s/p PCI RCA May '20\par *prior suspected ASA allergy May '20, ASA challenge\par neg Mar '22.\par \par here for followup.\par Reviewed lipid panel:  LDL in 40s on lipitor 20 NOT lipitor 40.\par Tolerating change from HCTZ to chlorthal.\par did not get basic chem.\par \par has not received 40 through [employer]\par discussed risks & benefits of further uptitration given two ACS events\par in 2 yrs. I recommend trying 40 daily even though LDL already low\par to aggressively reduce CAD risk.\par \par Confirmed pt is currently taking standard DAPT after our last discussion.\par He confirms he is taking ASA 81 mg daily and brilinata 90 BID\par \par He does report swelling of L side of tongue 1 week ago lasted a few hours\par took benadryl & resolved.\par \par home 130-140s\par \par

## 2022-07-20 NOTE — HISTORY OF PRESENT ILLNESS
[FreeTextEntry1] : 70 y/o\par \par *NSTEMI-s/p PCI OM1 Feb '22, s/p PCI RCA May '20\par *prior suspected ASA allergy May '20, ASA challenge\par neg Mar '22.\par \par Since last visit increased evening dose of coreg\par as AM dose causes fatigue.\par R side 120s L side high 130s.\par \par On crestor 40 no muscle aches.\par \par No chest pain, sometimes dyspnea.\par Still seeing allergist Dr. Melita Saldivar\par Swelling not due to ASA but he is investing cause\par as other testing has been abnormal, continues to see him.\par \par \par EKG:  NSR, LAE, Old inferior MI. \par Echo: 5/2020, LAE, MV repair with +1 MR, no pericardial effusion., normal LV function LVEF 55-60%. \par \par Cath Feb '22 5/3/'20: "JAH to ostial OM1 w/ TIMI3...maintain on \par ticagrelor, 180 load in lab then 90 BID w/ cilostazol 100 BID\par (failed ASA desensitization)." LM diffuse disease,\par LAD diffuse disease, LCx OM1 99% stenosis RCA mid 60%\par RCA distal 50% (RCA stent noted)\par \par Cardiac Cath: 5/2020, 1 Vessel CAD with inferior hypokinesis. \par Stent: 5/2020, JAH to RCA \par \par

## 2022-08-10 ENCOUNTER — RX CHANGE (OUTPATIENT)
Age: 72
End: 2022-08-10

## 2022-08-11 ENCOUNTER — NON-APPOINTMENT (OUTPATIENT)
Age: 72
End: 2022-08-11

## 2022-08-11 ENCOUNTER — RX RENEWAL (OUTPATIENT)
Age: 72
End: 2022-08-11

## 2022-08-18 ENCOUNTER — NON-APPOINTMENT (OUTPATIENT)
Age: 72
End: 2022-08-18

## 2022-08-26 ENCOUNTER — APPOINTMENT (OUTPATIENT)
Dept: INTERNAL MEDICINE | Facility: CLINIC | Age: 72
End: 2022-08-26

## 2022-08-26 VITALS
DIASTOLIC BLOOD PRESSURE: 74 MMHG | WEIGHT: 244 LBS | OXYGEN SATURATION: 97 % | TEMPERATURE: 98.8 F | SYSTOLIC BLOOD PRESSURE: 106 MMHG | BODY MASS INDEX: 33.05 KG/M2 | HEART RATE: 64 BPM | HEIGHT: 72 IN

## 2022-08-26 DIAGNOSIS — R73.03 PREDIABETES.: ICD-10-CM

## 2022-08-26 DIAGNOSIS — Z00.00 ENCOUNTER FOR GENERAL ADULT MEDICAL EXAMINATION W/OUT ABNORMAL FINDINGS: ICD-10-CM

## 2022-08-26 PROCEDURE — G0438: CPT

## 2022-08-26 NOTE — HEALTH RISK ASSESSMENT
[Never] : Never [Yes] : Yes [4 or more  times a week (4 pts)] : 4 or more  times a week (4 points) [1 or 2 (0 pts)] : 1 or 2 (0 points) [Never (0 pts)] : Never (0 points) [No] : In the past 12 months have you used drugs other than those required for medical reasons? No [Patient reported colonoscopy was normal] : Patient reported colonoscopy was normal [1] : 1) Little interest or pleasure doing things for several days (1) [PHQ-2 Positive] : PHQ-2 Positive [PHQ-9 Positive] : PHQ-9 Positive [I have developed a follow-up plan documented below in the note.] : I have developed a follow-up plan documented below in the note. [FHF5Glkgm] : 2 [ColonoscopyDate] : 01/2017

## 2022-08-26 NOTE — HISTORY OF PRESENT ILLNESS
[FreeTextEntry1] : CPE [de-identified] : KASSY KRAMER is a 72 year M who comes in for an annual physical exam.\par Pt has hx of CVA, MVR, CAD/MI 5/2020 s/p RCA stent on plavix only due to asa allergy, s/p asa desensitization 5/2020 however subsequent lip swelling post desensitization, HLD with myalgia with Crestor and Lipitor now on pravastatin and HTN. \par Pt recently in HH for CP and cardiac work up done and follows with cardiology. He also follows with  for BPH and nephrolithiasis and ED. \par Pt notes being "down" and being put on fluoxetine 20 mg 5 yrs by previous PCP, Dr.Seven Leyva and feels it is not helping anymore.\par He has seen  in past for colonoscopy.\par Patient denies any cp, sob,abdominal pain, nausea, vomiting, palpitations, fever, chills, constipation, diarrhea.\par

## 2022-08-26 NOTE — ASSESSMENT
[FreeTextEntry1] : 1.HM: Discussed fasting blood work to get.\par Patient counseled regarding recommendations for vaccines, seat belt safety, diet and exercise and all preventative screening.\par given referral to GI for colonoscopy. \par \par 2.Depression: increase Fluoxetine to 40 mg daily and f/u in 6-8 weeks. counseling provided to patient. \par \par 3. CAD s/p stent: c/w all cardiac meds and f/u with cardiology. \par \par 4.BPH: with nephrolithiases, f/u with urology.

## 2022-08-31 ENCOUNTER — APPOINTMENT (OUTPATIENT)
Dept: INTERNAL MEDICINE | Facility: CLINIC | Age: 72
End: 2022-08-31

## 2022-08-31 LAB
ALBUMIN SERPL ELPH-MCNC: 4.7 G/DL
ALP BLD-CCNC: 67 U/L
ALT SERPL-CCNC: 26 U/L
AST SERPL-CCNC: 26 U/L
BILIRUB DIRECT SERPL-MCNC: 0.3 MG/DL
BILIRUB INDIRECT SERPL-MCNC: 0.8 MG/DL
BILIRUB SERPL-MCNC: 1.1 MG/DL
ESTIMATED AVERAGE GLUCOSE: 140 MG/DL
HBA1C MFR BLD HPLC: 6.5 %
PROT SERPL-MCNC: 7.4 G/DL
TSH SERPL-ACNC: 2.23 UIU/ML

## 2022-08-31 PROCEDURE — 99441: CPT

## 2022-09-04 LAB
ANION GAP SERPL CALC-SCNC: 12 MMOL/L
BUN SERPL-MCNC: 20 MG/DL
CALCIUM SERPL-MCNC: 9.5 MG/DL
CHLORIDE SERPL-SCNC: 101 MMOL/L
CHOLEST SERPL-MCNC: 112 MG/DL
CHOLEST SERPL-MCNC: 112 MG/DL
CHOLEST SERPL-MCNC: 115 MG/DL
CO2 SERPL-SCNC: 28 MMOL/L
CREAT SERPL-MCNC: 1.06 MG/DL
EGFR: 76 ML/MIN/1.73M2
GLUCOSE SERPL-MCNC: 140 MG/DL
HDLC SERPL-MCNC: 42 MG/DL
HDLC SERPL-MCNC: 45 MG/DL
HDLC SERPL-MCNC: 46 MG/DL
LDLC SERPL CALC-MCNC: 37 MG/DL
LDLC SERPL CALC-MCNC: 40 MG/DL
LDLC SERPL CALC-MCNC: 46 MG/DL
LDLC SERPL DIRECT ASSAY-MCNC: 42 MG/DL
LDLC SERPL DIRECT ASSAY-MCNC: 48 MG/DL
NONHDLC SERPL-MCNC: 66 MG/DL
NONHDLC SERPL-MCNC: 70 MG/DL
NONHDLC SERPL-MCNC: 70 MG/DL
POTASSIUM SERPL-SCNC: 3.5 MMOL/L
SODIUM SERPL-SCNC: 141 MMOL/L
TRIGL SERPL-MCNC: 124 MG/DL
TRIGL SERPL-MCNC: 141 MG/DL
TRIGL SERPL-MCNC: 149 MG/DL

## 2022-09-13 ENCOUNTER — APPOINTMENT (OUTPATIENT)
Dept: UROLOGY | Facility: CLINIC | Age: 72
End: 2022-09-13

## 2022-09-13 VITALS
DIASTOLIC BLOOD PRESSURE: 94 MMHG | OXYGEN SATURATION: 96 % | HEART RATE: 63 BPM | BODY MASS INDEX: 32.1 KG/M2 | SYSTOLIC BLOOD PRESSURE: 157 MMHG | WEIGHT: 237 LBS | HEIGHT: 72 IN

## 2022-09-13 PROCEDURE — 93980 PENILE VASCULAR STUDY: CPT

## 2022-09-13 PROCEDURE — 54235 NJX CORPORA CAVERNOSA RX AGT: CPT

## 2022-09-13 PROCEDURE — A4216: CPT | Mod: NC

## 2022-10-20 ENCOUNTER — APPOINTMENT (OUTPATIENT)
Dept: INTERNAL MEDICINE | Facility: CLINIC | Age: 72
End: 2022-10-20

## 2022-10-20 ENCOUNTER — TRANSCRIPTION ENCOUNTER (OUTPATIENT)
Age: 72
End: 2022-10-20

## 2022-10-20 VITALS
OXYGEN SATURATION: 99 % | DIASTOLIC BLOOD PRESSURE: 78 MMHG | HEART RATE: 62 BPM | WEIGHT: 245 LBS | HEIGHT: 72 IN | TEMPERATURE: 97.6 F | SYSTOLIC BLOOD PRESSURE: 124 MMHG | BODY MASS INDEX: 33.18 KG/M2

## 2022-10-20 PROCEDURE — 99214 OFFICE O/P EST MOD 30 MIN: CPT

## 2022-10-20 NOTE — ASSESSMENT
[FreeTextEntry1] : 1.DM-2: encouraged diabetic diet in great depth, recheck hba1c next month. Pt advised to keep diabetic diet, decrease carbs and increase dietary protein intake.\par Exercise as tolerated 3-4 times a week.\par \par 2.Depression: given referral to behavioral health, continue on fluoxetine 40 mg daily. counseling provided to patient. \par \par 3.Ventral Hernia: given referral to general surgeon.

## 2022-10-20 NOTE — HISTORY OF PRESENT ILLNESS
PT orders received.  Pt is sleeping at this time.  RN requesting to allow patient to rest. Will re-attempt later today when patient is awake.   [FreeTextEntry1] : FU [de-identified] : KASSY KRAMER is a 72 year M who comes in for a follow up visit.\par Pt notes his kitchen is being redone so he has been eating out and ordering in for the last few months and has gained 12 lbs in the last 1 month. \par He was recently dx with dm-2 in recent months as well. \par He did increase his fluoxetine to 40 mg from 20 mg over the last 8 weeks and feels mild improvement. He notes issues in his marriage currently.

## 2022-11-01 ENCOUNTER — TRANSCRIPTION ENCOUNTER (OUTPATIENT)
Age: 72
End: 2022-11-01

## 2022-11-10 ENCOUNTER — RX RENEWAL (OUTPATIENT)
Age: 72
End: 2022-11-10

## 2022-11-22 ENCOUNTER — APPOINTMENT (OUTPATIENT)
Dept: CARDIOLOGY | Facility: CLINIC | Age: 72
End: 2022-11-22

## 2022-11-22 ENCOUNTER — NON-APPOINTMENT (OUTPATIENT)
Age: 72
End: 2022-11-22

## 2022-11-22 VITALS
SYSTOLIC BLOOD PRESSURE: 136 MMHG | HEART RATE: 65 BPM | OXYGEN SATURATION: 97 % | HEIGHT: 72 IN | BODY MASS INDEX: 32.37 KG/M2 | WEIGHT: 239 LBS | DIASTOLIC BLOOD PRESSURE: 90 MMHG

## 2022-11-22 PROCEDURE — 99214 OFFICE O/P EST MOD 30 MIN: CPT

## 2022-11-22 PROCEDURE — 93000 ELECTROCARDIOGRAM COMPLETE: CPT

## 2022-11-22 RX ORDER — ALBUTEROL SULFATE 90 UG/1
108 (90 BASE) INHALANT RESPIRATORY (INHALATION) AS DIRECTED
Qty: 1 | Refills: 0 | Status: DISCONTINUED | COMMUNITY
Start: 2022-07-20 | End: 2022-11-22

## 2022-11-22 RX ORDER — FLUTICASONE PROPIONATE 110 UG/1
110 AEROSOL, METERED RESPIRATORY (INHALATION)
Qty: 12 | Refills: 0 | Status: DISCONTINUED | COMMUNITY
Start: 2022-06-01 | End: 2022-11-22

## 2022-11-22 NOTE — HISTORY OF PRESENT ILLNESS
[FreeTextEntry1] : 70 y/o\par \par *NSTEMI-s/p PCI OM1 Feb '22, s/p PCI RCA May '20\par *prior suspected ASA allergy May '20, ASA challenge\par neg Mar '22.\par *HTN\par \par here for followup.\par pt states that Dr. Nguyen has diagnosed him w/ early DM\par advised lifestyle changes including diet will followup in 2-3 weeks.\par \par Despite instructing pt april '22 to stop cilostazol\par he thinks he is taking it. it is on his list but he is not sure he\par is taking it.\par Also is unsure if he is taking HCTZ and chlorthal. together\par I told him to switch from HCTZ to chlor.\par \par No new cardiac symptoms: chest pain, dyspnea, etc.\par Colonoscopy for screening may be planned Feb Mar '23.\par \par --------------------------------------\par PRIOR CARDIAC TESTING:\par EKG:  NSR, LAE, Old inferior MI. \par Echo: 5/2020, LAE, MV repair with +1 MR, no pericardial effusion., normal LV function LVEF 55-60%. \par \par Cath Feb '22: "JAH to ostial OM1 w/ TIMI3...maintain on \par ticagrelor, 180 load in lab then 90 BID w/ cilostazol 100 BID\par (failed ASA desensitization)." LM diffuse disease,\par LAD diffuse disease, LCx OM1 99% stenosis RCA mid 60%\par RCA distal 50% (RCA stent noted)\par \par Cardiac Cath: 5/2020, 1 Vessel CAD with inferior hypokinesis. \par Stent: 5/2020, JAH to RCA \par --------------------------------------

## 2022-11-22 NOTE — ASSESSMENT
[FreeTextEntry1] : A/P:\par \par -cont. current meds\par -pt will call weight loss clinic in [place] for weight loss\par options\par -will contact me later this evening for med reconcillation\par to make sure 1. not taking cilostazol, 2.not taking HCTZ\par \par -Followup visit 1st week in feb, will clear for \par colonoscopy planned in Feb Mar '23.\par

## 2022-12-06 ENCOUNTER — NON-APPOINTMENT (OUTPATIENT)
Age: 72
End: 2022-12-06

## 2022-12-06 ENCOUNTER — APPOINTMENT (OUTPATIENT)
Dept: ORTHOPEDIC SURGERY | Facility: CLINIC | Age: 72
End: 2022-12-06

## 2022-12-06 VITALS
WEIGHT: 239 LBS | DIASTOLIC BLOOD PRESSURE: 88 MMHG | SYSTOLIC BLOOD PRESSURE: 153 MMHG | BODY MASS INDEX: 32.37 KG/M2 | HEIGHT: 72 IN | HEART RATE: 68 BPM

## 2022-12-06 PROCEDURE — 20610 DRAIN/INJ JOINT/BURSA W/O US: CPT | Mod: LT

## 2022-12-06 PROCEDURE — 99204 OFFICE O/P NEW MOD 45 MIN: CPT | Mod: 25

## 2022-12-06 PROCEDURE — 73564 X-RAY EXAM KNEE 4 OR MORE: CPT | Mod: LT

## 2022-12-06 NOTE — DISCUSSION/SUMMARY
[de-identified] : Left knee end-stage osteoarthritis\par \par Extensive discussion of the natural history of this disease was had with the patient.  We discussed the treatment options ranging from conservative therapy which includes anti-inflammatories, steroid injections, physical therapy, weight loss, and activity modification.  We did discuss that the ultimate treatment for arthritis is a joint replacement.  At this time we will continue conservative treatment as he feels he is not quite at the point of needing a replacement..  At this time he elected for steroid injection.  We will order gel injections since they have worked in the past.  Patient quired about PRP injections.  We did discuss that although we offer them, I do not believe he will be beneficial in end-stage osteoarthritis.  We will call him once they are ready.

## 2022-12-06 NOTE — PROCEDURE
[de-identified] : Left knee injection - Steroid\par The risks, benefits, and alternatives of the injection were reviewed/discussed with the patient today in office and all of their questions were answered. The patient consented to the procedure. The inferolateral injection site was prepped with chloroprep.  Cold spray was utilized to numb the skin. Utilizing sterile technique, the knee was injected with 1 ml 40 mg methylprednisolone, 4 ml 1% Lidocaine, 5 mL 0.25% Bupivicaine. A sterile bandage was applied. The patient tolerated the procedure well and there were no complications.

## 2022-12-06 NOTE — HISTORY OF PRESENT ILLNESS
[de-identified] : Patient presents for left knee pain which is been going on many years.  States he has trouble walking due to the pain especially going downstairs.  Used to do martial arts but can get off the mat so he stopped doing it.  Does use a stationary bike.  States the pain is mostly in the anterior medial aspect of his knee.  Does have some buckling at times.  Using a sleeve which helps.  Not taking anything for the pain.  Previously has received steroid and gel injections from Dr. Tabor, last was probably 2 to 3 years ago which she states helped for at least a few months.  Is on Brilinta and aspirin, has 2 stents for CAD, recently diagnosed with diabetes, A1c 6.5, history of NSTEMI and BARRERA.

## 2022-12-06 NOTE — PHYSICAL EXAM
[de-identified] : General Appearance: normal without acute distress\par Mental: Alert and oriented x 3\par Psych/affect: appropriate, cooperative\par Gait: Normal gait\par \par B/l Hips: Normal ROM without pain on IR/ER\par \par Left knee\par Inspection: Mild effusion, no erythema.\par Wounds: none.\par Alignment: Neutral\par Palpation: tender to palpation at medial joint line\par ROM active (in degrees): 0-110 with crepitus & pain through the arc of motion\par Ligamentous laxity: all ligaments appear stable, negative ant. drawer test, negative post. drawer test, stable to varus stress test, stable to valgus stress test. Negative Lachman's test\par Meniscal Test: Negative McMurrays, negative Armando.\par Muscle Test: good quad strength. [de-identified] : Left knee xrays, standing AP/Lateral and Merchant films, and 45 degree PA standing view are reviewed and demonstrate diffuse tricompartmental degenerative arthritis, medial joint space narrowing, marginal osteophytes, bone on bone with sclerosis, patellofemoral joint space narrowing, peripheral osteophytes

## 2022-12-09 ENCOUNTER — APPOINTMENT (OUTPATIENT)
Dept: INTERNAL MEDICINE | Facility: CLINIC | Age: 72
End: 2022-12-09

## 2022-12-13 ENCOUNTER — NON-APPOINTMENT (OUTPATIENT)
Age: 72
End: 2022-12-13

## 2022-12-22 ENCOUNTER — APPOINTMENT (OUTPATIENT)
Dept: ORTHOPEDIC SURGERY | Facility: CLINIC | Age: 72
End: 2022-12-22
Payer: MEDICARE

## 2022-12-22 PROCEDURE — 20610 DRAIN/INJ JOINT/BURSA W/O US: CPT | Mod: LT

## 2022-12-22 NOTE — PROCEDURE
[de-identified] : Left knee Injection - hyaluronic acid\par The risks, benefits, and alternatives of the injection were reviewed/discussed with the patient today in office and all of their questions were answered. The patient consented to the procedure. The [inferolateral] injection site was prepped with chloroprep.  Cold spray was utilized to numb the skin. Utilizing sterile technique, the [knee] was injected with Orthovisc. A sterile bandage was applied. The patient tolerated the procedure well and there were no complications.\par Lot: [id number]\par Exp: 10/31/2024

## 2022-12-27 ENCOUNTER — APPOINTMENT (OUTPATIENT)
Dept: ORTHOPEDIC SURGERY | Facility: CLINIC | Age: 72
End: 2022-12-27

## 2022-12-28 ENCOUNTER — APPOINTMENT (OUTPATIENT)
Dept: SURGERY | Facility: CLINIC | Age: 72
End: 2022-12-28

## 2022-12-29 ENCOUNTER — APPOINTMENT (OUTPATIENT)
Dept: ORTHOPEDIC SURGERY | Facility: CLINIC | Age: 72
End: 2022-12-29

## 2022-12-29 PROCEDURE — 20610 DRAIN/INJ JOINT/BURSA W/O US: CPT | Mod: LT

## 2022-12-29 NOTE — PROCEDURE
[de-identified] : Left knee Injection - hyaluronic acid\par The risks, benefits, and alternatives of the injection were reviewed/discussed with the patient today in office and all of their questions were answered. The patient consented to the procedure. The [inferolateral] injection site was prepped with chloroprep.  Cold spray was utilized to numb the skin. Utilizing sterile technique, the [knee] was injected with Orthovisc. A sterile bandage was applied. The patient tolerated the procedure well and there were no complications.\par Lot: [id number]\par Exp: 10/31/2024

## 2023-01-03 ENCOUNTER — NON-APPOINTMENT (OUTPATIENT)
Age: 73
End: 2023-01-03

## 2023-01-03 ENCOUNTER — APPOINTMENT (OUTPATIENT)
Dept: CARDIOLOGY | Facility: CLINIC | Age: 73
End: 2023-01-03
Payer: MEDICARE

## 2023-01-03 VITALS
HEIGHT: 72 IN | BODY MASS INDEX: 32.51 KG/M2 | DIASTOLIC BLOOD PRESSURE: 80 MMHG | TEMPERATURE: 97.3 F | WEIGHT: 240 LBS | HEART RATE: 66 BPM | OXYGEN SATURATION: 95 % | SYSTOLIC BLOOD PRESSURE: 138 MMHG

## 2023-01-03 PROCEDURE — 93000 ELECTROCARDIOGRAM COMPLETE: CPT

## 2023-01-03 PROCEDURE — 99214 OFFICE O/P EST MOD 30 MIN: CPT

## 2023-01-06 ENCOUNTER — APPOINTMENT (OUTPATIENT)
Dept: ORTHOPEDIC SURGERY | Facility: CLINIC | Age: 73
End: 2023-01-06
Payer: MEDICARE

## 2023-01-06 PROCEDURE — 20610 DRAIN/INJ JOINT/BURSA W/O US: CPT | Mod: LT

## 2023-01-06 NOTE — PROCEDURE
[de-identified] : Left knee Injection - hyaluronic acid\par The risks, benefits, and alternatives of the injection were reviewed/discussed with the patient today in office and all of their questions were answered. The patient consented to the procedure. The inferolateral injection site was prepped with chloroprep.  Cold spray was utilized to numb the skin. Utilizing sterile technique, the knee was injected with Orthovisc. A sterile bandage was applied. The patient tolerated the procedure well and there were no complications.\par Lot: [id number]\par Exp: 10/31/2024

## 2023-01-09 NOTE — HISTORY OF PRESENT ILLNESS
[FreeTextEntry1] : 71 Y/O male PMH:  MVR, HTN, DM, followed with PCP,  CAD NSTEMI S/P  PCI OM1 Feb '22,  S/P JAH RCA May 2020,  prior suspected ASA allergy  May '20,  ASA challenge neg Mar '22 here today in routine cardiac follow up \par \par \par Nuclear stress test 4/22/2022 Non ischemic \par Echo: 5/2020, LAE, MV repair with +1 MR, no pericardial effusion., normal LV function LVEF 55-60%. \par \par Cath Feb '22: "JAH to ostial OM1 w/ TIMI3\par LM diffuse disease,\par LAD diffuse disease, LCx OM1 99% stenosis RCA mid 60%\par RCA distal 50% (RCA stent noted)\par \par \par \par

## 2023-01-09 NOTE — DISCUSSION/SUMMARY
[FreeTextEntry1] : CAD:  No active cardiac complaints, continue current medications will return for Echo/MARK/IS Aorta/Carotid US\par \par HTN: Controlled\par \par DM: medical management \par \par OV 3 months\par \par Plan DW patient \par   [EKG obtained to assist in diagnosis and management of assessed problem(s)] : EKG obtained to assist in diagnosis and management of assessed problem(s)

## 2023-01-27 ENCOUNTER — RX RENEWAL (OUTPATIENT)
Age: 73
End: 2023-01-27

## 2023-01-31 ENCOUNTER — APPOINTMENT (OUTPATIENT)
Dept: CARDIOLOGY | Facility: CLINIC | Age: 73
End: 2023-01-31
Payer: MEDICARE

## 2023-01-31 PROCEDURE — 93978 VASCULAR STUDY: CPT

## 2023-01-31 PROCEDURE — 93306 TTE W/DOPPLER COMPLETE: CPT

## 2023-02-07 ENCOUNTER — APPOINTMENT (OUTPATIENT)
Dept: CARDIOLOGY | Facility: CLINIC | Age: 73
End: 2023-02-07
Payer: MEDICARE

## 2023-02-07 PROCEDURE — 93880 EXTRACRANIAL BILAT STUDY: CPT

## 2023-02-08 ENCOUNTER — APPOINTMENT (OUTPATIENT)
Dept: CARDIOLOGY | Facility: CLINIC | Age: 73
End: 2023-02-08
Payer: MEDICARE

## 2023-02-08 DIAGNOSIS — I35.0 NONRHEUMATIC AORTIC (VALVE) STENOSIS: ICD-10-CM

## 2023-02-08 PROCEDURE — 93922 UPR/L XTREMITY ART 2 LEVELS: CPT

## 2023-02-17 ENCOUNTER — APPOINTMENT (OUTPATIENT)
Dept: CARDIOLOGY | Facility: CLINIC | Age: 73
End: 2023-02-17
Payer: MEDICARE

## 2023-02-17 VITALS
WEIGHT: 246.91 LBS | BODY MASS INDEX: 33.44 KG/M2 | SYSTOLIC BLOOD PRESSURE: 130 MMHG | DIASTOLIC BLOOD PRESSURE: 82 MMHG | HEIGHT: 72 IN | OXYGEN SATURATION: 97 % | HEART RATE: 64 BPM

## 2023-02-17 PROCEDURE — 93000 ELECTROCARDIOGRAM COMPLETE: CPT

## 2023-02-17 PROCEDURE — 99214 OFFICE O/P EST MOD 30 MIN: CPT

## 2023-02-17 RX ORDER — TICAGRELOR 90 MG/1
90 TABLET ORAL TWICE DAILY
Qty: 60 | Refills: 7 | Status: DISCONTINUED | COMMUNITY
Start: 1900-01-01 | End: 2023-02-17

## 2023-02-17 NOTE — HISTORY OF PRESENT ILLNESS
[FreeTextEntry1] : 71 y/o\par \par *NSTEMI-s/p PCI OM1 Feb '22, s/p PCI RCA May '20\par *prior suspected ASA allergy May '20, ASA challenge\par neg Mar '22.\par *HTN\par \par here for followup.\par \par Reviewed carotid US - mild disease\par MARK w/ moderate disease - pt denies claudication.\par echo w/ moderate AS\par concerned about possible development of DM he was\par informed of this through PCP.

## 2023-02-17 NOTE — ASSESSMENT
[FreeTextEntry1] : A/P:\par \par -brilinta changed to plavix due to cost\par -discussed wt\par \par -Echo in 1 yr to reeavluate AS.\par \par -Pt may need colonoscopy in the future.\par He is cleared from cardiology standpoint.  Hold plavix as long as needed prior\par to procedure & resume when safe from GI standpoint after.\par \par Return 6 months.\par \par

## 2023-02-28 ENCOUNTER — APPOINTMENT (OUTPATIENT)
Dept: INTERNAL MEDICINE | Facility: CLINIC | Age: 73
End: 2023-02-28

## 2023-03-10 ENCOUNTER — NON-APPOINTMENT (OUTPATIENT)
Age: 73
End: 2023-03-10

## 2023-03-15 DIAGNOSIS — R53.83 OTHER FATIGUE: ICD-10-CM

## 2023-03-16 ENCOUNTER — LABORATORY RESULT (OUTPATIENT)
Age: 73
End: 2023-03-16

## 2023-03-16 ENCOUNTER — EMERGENCY (EMERGENCY)
Facility: HOSPITAL | Age: 73
LOS: 0 days | Discharge: HOME | End: 2023-03-16
Attending: EMERGENCY MEDICINE
Payer: MEDICARE

## 2023-03-16 ENCOUNTER — NON-APPOINTMENT (OUTPATIENT)
Age: 73
End: 2023-03-16

## 2023-03-16 VITALS
OXYGEN SATURATION: 97 % | DIASTOLIC BLOOD PRESSURE: 65 MMHG | HEART RATE: 71 BPM | TEMPERATURE: 98 F | RESPIRATION RATE: 16 BRPM | SYSTOLIC BLOOD PRESSURE: 130 MMHG

## 2023-03-16 VITALS — WEIGHT: 235.01 LBS

## 2023-03-16 DIAGNOSIS — I25.10 ATHEROSCLEROTIC HEART DISEASE OF NATIVE CORONARY ARTERY WITHOUT ANGINA PECTORIS: ICD-10-CM

## 2023-03-16 DIAGNOSIS — Z95.5 PRESENCE OF CORONARY ANGIOPLASTY IMPLANT AND GRAFT: ICD-10-CM

## 2023-03-16 DIAGNOSIS — R19.7 DIARRHEA, UNSPECIFIED: ICD-10-CM

## 2023-03-16 DIAGNOSIS — Z96.643 PRESENCE OF ARTIFICIAL HIP JOINT, BILATERAL: ICD-10-CM

## 2023-03-16 DIAGNOSIS — Z41.9 ENCOUNTER FOR PROCEDURE FOR PURPOSES OTHER THAN REMEDYING HEALTH STATE, UNSPECIFIED: Chronic | ICD-10-CM

## 2023-03-16 DIAGNOSIS — Z95.2 PRESENCE OF PROSTHETIC HEART VALVE: ICD-10-CM

## 2023-03-16 DIAGNOSIS — E78.5 HYPERLIPIDEMIA, UNSPECIFIED: ICD-10-CM

## 2023-03-16 DIAGNOSIS — Z98.890 OTHER SPECIFIED POSTPROCEDURAL STATES: Chronic | ICD-10-CM

## 2023-03-16 DIAGNOSIS — G47.30 SLEEP APNEA, UNSPECIFIED: ICD-10-CM

## 2023-03-16 DIAGNOSIS — Z96.643 PRESENCE OF ARTIFICIAL HIP JOINT, BILATERAL: Chronic | ICD-10-CM

## 2023-03-16 DIAGNOSIS — Z79.01 LONG TERM (CURRENT) USE OF ANTICOAGULANTS: ICD-10-CM

## 2023-03-16 DIAGNOSIS — N40.0 BENIGN PROSTATIC HYPERPLASIA WITHOUT LOWER URINARY TRACT SYMPTOMS: ICD-10-CM

## 2023-03-16 DIAGNOSIS — Z86.74 PERSONAL HISTORY OF SUDDEN CARDIAC ARREST: ICD-10-CM

## 2023-03-16 DIAGNOSIS — Z88.6 ALLERGY STATUS TO ANALGESIC AGENT: ICD-10-CM

## 2023-03-16 DIAGNOSIS — R11.10 VOMITING, UNSPECIFIED: ICD-10-CM

## 2023-03-16 DIAGNOSIS — Z99.89 DEPENDENCE ON OTHER ENABLING MACHINES AND DEVICES: ICD-10-CM

## 2023-03-16 DIAGNOSIS — E87.6 HYPOKALEMIA: ICD-10-CM

## 2023-03-16 DIAGNOSIS — I25.2 OLD MYOCARDIAL INFARCTION: ICD-10-CM

## 2023-03-16 DIAGNOSIS — Z88.8 ALLERGY STATUS TO OTHER DRUGS, MEDICAMENTS AND BIOLOGICAL SUBSTANCES: ICD-10-CM

## 2023-03-16 LAB
ALBUMIN SERPL ELPH-MCNC: 3.1 G/DL — LOW (ref 3.3–5)
ALP SERPL-CCNC: 58 U/L — SIGNIFICANT CHANGE UP (ref 40–120)
ALT FLD-CCNC: 51 U/L — SIGNIFICANT CHANGE UP (ref 12–78)
ANION GAP SERPL CALC-SCNC: 13 MMOL/L
ANION GAP SERPL CALC-SCNC: 5 MMOL/L — SIGNIFICANT CHANGE UP (ref 5–17)
AST SERPL-CCNC: 46 U/L — HIGH (ref 15–37)
BASOPHILS # BLD AUTO: 0 K/UL — SIGNIFICANT CHANGE UP (ref 0–0.2)
BASOPHILS NFR BLD AUTO: 0 % — SIGNIFICANT CHANGE UP (ref 0–2)
BILIRUB SERPL-MCNC: 1.7 MG/DL — HIGH (ref 0.2–1.2)
BUN SERPL-MCNC: 17 MG/DL
BUN SERPL-MCNC: 21 MG/DL — SIGNIFICANT CHANGE UP (ref 7–23)
CALCIUM SERPL-MCNC: 8.8 MG/DL — SIGNIFICANT CHANGE UP (ref 8.5–10.1)
CALCIUM SERPL-MCNC: 9.3 MG/DL
CHLORIDE SERPL-SCNC: 92 MMOL/L
CHLORIDE SERPL-SCNC: 98 MMOL/L — SIGNIFICANT CHANGE UP (ref 96–108)
CO2 SERPL-SCNC: 31 MMOL/L
CO2 SERPL-SCNC: 31 MMOL/L — SIGNIFICANT CHANGE UP (ref 22–31)
CREAT SERPL-MCNC: 1.15 MG/DL — SIGNIFICANT CHANGE UP (ref 0.5–1.3)
CREAT SERPL-MCNC: 1.18 MG/DL
EGFR: 66 ML/MIN/1.73M2
EGFR: 68 ML/MIN/1.73M2 — SIGNIFICANT CHANGE UP
EOSINOPHIL # BLD AUTO: 0 K/UL — SIGNIFICANT CHANGE UP (ref 0–0.5)
EOSINOPHIL NFR BLD AUTO: 0 % — SIGNIFICANT CHANGE UP (ref 0–6)
GLUCOSE SERPL-MCNC: 119 MG/DL
GLUCOSE SERPL-MCNC: 121 MG/DL — HIGH (ref 70–99)
HCT VFR BLD CALC: 41.6 % — SIGNIFICANT CHANGE UP (ref 39–50)
HGB BLD-MCNC: 14.2 G/DL — SIGNIFICANT CHANGE UP (ref 13–17)
LYMPHOCYTES # BLD AUTO: 2.48 K/UL — SIGNIFICANT CHANGE UP (ref 1–3.3)
LYMPHOCYTES # BLD AUTO: 25 % — SIGNIFICANT CHANGE UP (ref 13–44)
MAGNESIUM SERPL-MCNC: 2.4 MG/DL — SIGNIFICANT CHANGE UP (ref 1.6–2.6)
MANUAL DIF COMMENT BLD-IMP: SIGNIFICANT CHANGE UP
MCHC RBC-ENTMCNC: 30.9 PG — SIGNIFICANT CHANGE UP (ref 27–34)
MCHC RBC-ENTMCNC: 34.1 GM/DL — SIGNIFICANT CHANGE UP (ref 32–36)
MCV RBC AUTO: 90.4 FL — SIGNIFICANT CHANGE UP (ref 80–100)
MONOCYTES # BLD AUTO: 1.58 K/UL — HIGH (ref 0–0.9)
MONOCYTES NFR BLD AUTO: 16 % — HIGH (ref 2–14)
NEUTROPHILS # BLD AUTO: 5.84 K/UL — SIGNIFICANT CHANGE UP (ref 1.8–7.4)
NEUTROPHILS NFR BLD AUTO: 59 % — SIGNIFICANT CHANGE UP (ref 43–77)
NRBC # BLD: 0 /100 — SIGNIFICANT CHANGE UP (ref 0–0)
NRBC # BLD: SIGNIFICANT CHANGE UP /100 WBCS (ref 0–0)
PHOSPHATE SERPL-MCNC: 3.1 MG/DL — SIGNIFICANT CHANGE UP (ref 2.5–4.5)
PLAT MORPH BLD: NORMAL — SIGNIFICANT CHANGE UP
PLATELET # BLD AUTO: 202 K/UL — SIGNIFICANT CHANGE UP (ref 150–400)
POTASSIUM SERPL-MCNC: 2.7 MMOL/L — CRITICAL LOW (ref 3.5–5.3)
POTASSIUM SERPL-SCNC: 2.7 MMOL/L — CRITICAL LOW (ref 3.5–5.3)
POTASSIUM SERPL-SCNC: 2.9 MMOL/L
PROT SERPL-MCNC: 6.9 GM/DL — SIGNIFICANT CHANGE UP (ref 6–8.3)
RBC # BLD: 4.6 M/UL — SIGNIFICANT CHANGE UP (ref 4.2–5.8)
RBC # FLD: 12.4 % — SIGNIFICANT CHANGE UP (ref 10.3–14.5)
RBC BLD AUTO: NORMAL — SIGNIFICANT CHANGE UP
SODIUM SERPL-SCNC: 134 MMOL/L — LOW (ref 135–145)
SODIUM SERPL-SCNC: 136 MMOL/L
TSH SERPL-ACNC: 1.32 UIU/ML
WBC # BLD: 9.9 K/UL — SIGNIFICANT CHANGE UP (ref 3.8–10.5)
WBC # FLD AUTO: 9.9 K/UL — SIGNIFICANT CHANGE UP (ref 3.8–10.5)

## 2023-03-16 PROCEDURE — 99284 EMERGENCY DEPT VISIT MOD MDM: CPT | Mod: 25

## 2023-03-16 PROCEDURE — 83735 ASSAY OF MAGNESIUM: CPT

## 2023-03-16 PROCEDURE — 80053 COMPREHEN METABOLIC PANEL: CPT

## 2023-03-16 PROCEDURE — 96366 THER/PROPH/DIAG IV INF ADDON: CPT

## 2023-03-16 PROCEDURE — 93010 ELECTROCARDIOGRAM REPORT: CPT

## 2023-03-16 PROCEDURE — 93005 ELECTROCARDIOGRAM TRACING: CPT

## 2023-03-16 PROCEDURE — 36415 COLL VENOUS BLD VENIPUNCTURE: CPT

## 2023-03-16 PROCEDURE — 85025 COMPLETE CBC W/AUTO DIFF WBC: CPT

## 2023-03-16 PROCEDURE — 96365 THER/PROPH/DIAG IV INF INIT: CPT

## 2023-03-16 PROCEDURE — 84100 ASSAY OF PHOSPHORUS: CPT

## 2023-03-16 RX ORDER — POTASSIUM CHLORIDE 20 MEQ
40 PACKET (EA) ORAL ONCE
Refills: 0 | Status: COMPLETED | OUTPATIENT
Start: 2023-03-16 | End: 2023-03-16

## 2023-03-16 RX ORDER — POTASSIUM CHLORIDE 20 MEQ
10 PACKET (EA) ORAL
Refills: 0 | Status: COMPLETED | OUTPATIENT
Start: 2023-03-16 | End: 2023-03-16

## 2023-03-16 RX ADMIN — Medication 100 MILLIEQUIVALENT(S): at 21:14

## 2023-03-16 RX ADMIN — Medication 40 MILLIEQUIVALENT(S): at 19:24

## 2023-03-16 RX ADMIN — Medication 100 MILLIEQUIVALENT(S): at 19:24

## 2023-03-16 RX ADMIN — Medication 100 MILLIEQUIVALENT(S): at 20:21

## 2023-03-16 RX ADMIN — Medication 10 MILLIEQUIVALENT(S): at 20:21

## 2023-03-16 RX ADMIN — Medication 10 MILLIEQUIVALENT(S): at 21:13

## 2023-03-16 NOTE — ED STATDOCS - OBJECTIVE STATEMENT
72 year old male with PMHx of CAD/MI 5/2020 s/p stent to RCA on plavix presents to the ED sent in by PCP Dr. Pelletier for low potassium (2.7) on outpatient labs. Pt relates that he had a stomach bug with vomiting and diarrhea for 4 days which prompted outpatient labs to be taken. Pt takes potassium supplement at baseline. No current complaints at this time, nausea and diarrhea has subsided. No recent travel. No history of abdominal surgeries.

## 2023-03-16 NOTE — ED STATDOCS - CLINICAL SUMMARY MEDICAL DECISION MAKING FREE TEXT BOX
Sent to ED for low potassium. Pt with nausea, vomiting, and diarrhea x 4 days, now improving. Plan: labs and electrolyte replacement.

## 2023-03-16 NOTE — ED STATDOCS - PROGRESS NOTE DETAILS
71 yo male with a PMH of CAD, stents, on plavix, presents with hypokalemia. Pt saw his PMD today after having 4 days of n/v/d and upset stomach. Pt was yosi 71 yo male with a PMH of CAD, stents, on plavix, presents with hypokalemia. Pt saw his PMD today after having 4 days of n/v/d and upset stomach. Pt was called today by his pmd and told that his K was 2.7. Pt states his symptoms have improved and was able to eat today.   Will check labs and replenish K as needed. -Frankie Maria PA-C

## 2023-03-16 NOTE — ED STATDOCS - NSFOLLOWUPINSTRUCTIONS_ED_ALL_ED_FT
Follow up with your primary care doctor to have the blood work and potassum rechecked.   Continue to take your supplements as directed.     Return to the ER for any new or other concerns.       Hypokalemia    WHAT YOU NEED TO KNOW:    Hypokalemia is a low level of potassium in your blood. Potassium helps control how your muscles, heart, and digestive system work. Hypokalemia occurs when your body loses too much potassium or does not absorb enough from food.     DISCHARGE INSTRUCTIONS:    Seek care immediately if:   •You cannot move your arm or leg.      •You have a fast or irregular heartbeat.      •You are too tired or weak to stand up.      Contact your healthcare provider if:   •You are vomiting, or you have diarrhea.      •You have numbness or tingling in your arms or legs.      •Your symptoms do not go away or they get worse.      •You have questions or concerns about your condition or care.      Medicines:   •Potassium will be given to bring your potassium levels back to normal.      •Take your medicine as directed. Contact your healthcare provider if you think your medicine is not helping or if you have side effects. Tell your provider if you are allergic to any medicine. Keep a list of the medicines, vitamins, and herbs you take. Include the amounts, and when and why you take them. Bring the list or the pill bottles to follow-up visits. Carry your medicine list with you in case of an emergency.      Eat foods that are high in potassium: Foods that are high in potassium include bananas, oranges, tomatoes, potatoes, and avocado. Wong beans, turkey, salmon, lean beef, yogurt, and milk are also high in potassium. Ask your healthcare provider or dietitian for more information about foods that are high in potassium.     Follow up with your healthcare provider as directed: Write down your questions so you remember to ask them during your visits.

## 2023-03-16 NOTE — ED STATDOCS - PATIENT PORTAL LINK FT
You can access the FollowMyHealth Patient Portal offered by [facility] by registering at the following website: [email or web address]. By joining [facility]’s FollowMyHealth portal, you will also be able to view your health information using other applications (apps) compatible with our system.

## 2023-03-16 NOTE — ED ADULT NURSE NOTE - OBJECTIVE STATEMENT
72y male presents to the ED sent by PCP for low potassium (2.7). Pt reports having a stomach virus 4 days ago, experiencing V/D. Pt reports "I take daily PO potassium but I just felt like stop taking it 3-4 days before the stomach virus started." Pt denies chest pain, SOB. Pt reports V/D have resolved. Pt has no complaints at this time.

## 2023-03-16 NOTE — ED STATDOCS - ATTENDING APP SHARED VISIT CONTRIBUTION OF CARE
I, Parris Roblero MD,  performed the initial face to face bedside interview with this patient regarding history of present illness, review of symptoms and relevant past medical, social and family history.  I completed an independent physical examination.  I was the initial provider who evaluated this patient.   I personally saw the patient and performed a substantive portion of the visit including all aspects of the medical decision making.  I have signed out the follow up of any pending tests (i.e. labs, radiological studies) to the GEORGE.  I have communicated the patient’s plan of care and disposition with the GEORGE.  The history, relevant review of systems, past medical and surgical history, medical decision making, and physical examination was documented by the scribe in my presence and I attest to the accuracy of the documentation.

## 2023-03-18 ENCOUNTER — NON-APPOINTMENT (OUTPATIENT)
Age: 73
End: 2023-03-18

## 2023-03-20 LAB
BASOPHILS # BLD AUTO: 0.04 K/UL
BASOPHILS NFR BLD AUTO: 0.5 %
EOSINOPHIL # BLD AUTO: 0.08 K/UL
EOSINOPHIL NFR BLD AUTO: 0.9 %
HCT VFR BLD CALC: 43.8 %
HGB BLD-MCNC: 14.6 G/DL
IMM GRANULOCYTES NFR BLD AUTO: 0.6 %
LYMPHOCYTES # BLD AUTO: 2.26 K/UL
LYMPHOCYTES NFR BLD AUTO: 26.4 %
MAN DIFF?: NORMAL
MCHC RBC-ENTMCNC: 30.7 PG
MCHC RBC-ENTMCNC: 33.3 GM/DL
MCV RBC AUTO: 92.2 FL
MONOCYTES # BLD AUTO: 1.64 K/UL
MONOCYTES NFR BLD AUTO: 19.2 %
NEUTROPHILS # BLD AUTO: 4.48 K/UL
NEUTROPHILS NFR BLD AUTO: 52.4 %
PLATELET # BLD AUTO: 215 K/UL
POTASSIUM SERPL-SCNC: 3.2 MMOL/L
RBC # BLD: 4.75 M/UL
RBC # FLD: 12.7 %
WBC # FLD AUTO: 8.55 K/UL

## 2023-03-21 ENCOUNTER — APPOINTMENT (OUTPATIENT)
Dept: INTERNAL MEDICINE | Facility: CLINIC | Age: 73
End: 2023-03-21
Payer: MEDICARE

## 2023-03-21 VITALS
DIASTOLIC BLOOD PRESSURE: 74 MMHG | BODY MASS INDEX: 32.37 KG/M2 | WEIGHT: 239 LBS | TEMPERATURE: 98.4 F | HEIGHT: 72 IN | SYSTOLIC BLOOD PRESSURE: 120 MMHG | OXYGEN SATURATION: 98 % | HEART RATE: 64 BPM

## 2023-03-21 PROCEDURE — 99214 OFFICE O/P EST MOD 30 MIN: CPT

## 2023-03-21 NOTE — HISTORY OF PRESENT ILLNESS
[FreeTextEntry1] : FU [de-identified] : KASSY KRAMER is a 72 year M who comes in for a follow up visit.\par Patient states since around March 19 she had symptoms of nausea, vomiting multiple episodes as well as diarrhea with multiple episodes and fever and chills.  Home COVID testing was negative at that time and he was advised to hydrate and keep brat diet.  Patient's symptoms improved over the next 4 to 5 days but felt very fatigued and blood work was done and patient found to have low potassium at 2.9.  He went to the emergency room and had 3 infusions of potassium and EKG was done which was stable.  Other blood work done showed mildly elevated LFTs with total bilirubin and AST high.  Repeat potassium was lower at 2.7.\par Since then repeat blood work done yesterday shows potassium at 3.2.  Patient does admit to stopping his potassium chloride 20 mEq 3 to 4 days prior to his diarrhea symptoms as he states that he was taking too many medications and this was a large medication.\par Overall patient states he feels better with improvement in his fatigue and energy and he is starting back to get his appetite as well.  He has not had an episode of diarrhea since Sunday.  He is no longer having any nausea.  He is having normal bowel movements at this time.\par Patient denies any cp, sob,abdominal pain, palpitations, fever, chills, constipation.\par

## 2023-03-21 NOTE — ASSESSMENT
[FreeTextEntry1] : 1.nausea vomiting diarrhea: Likely secondary to viral gastroenteritis.  Discussed to recheck LFTs and potassium tomorrow.  Continue on potassium chloride 20 mEq twice daily times today and then to start once daily.  He is no longer having diarrhea.  Discussed advance his diet.\par \par 2.diabetes mellitus type 2: Return next month for repeat hemoglobin A1c. Pt advised to keep diabetic diet, decrease carbs and increase dietary protein intake.\par Exercise as tolerated 3-4 times a week.\par

## 2023-03-27 LAB
ALBUMIN SERPL ELPH-MCNC: 3.8 G/DL
ALP BLD-CCNC: 63 U/L
ALT SERPL-CCNC: 57 U/L
AST SERPL-CCNC: 40 U/L
BILIRUB DIRECT SERPL-MCNC: 0.3 MG/DL
BILIRUB INDIRECT SERPL-MCNC: 0.5 MG/DL
BILIRUB SERPL-MCNC: 0.8 MG/DL
POTASSIUM SERPL-SCNC: 3.6 MMOL/L
PROT SERPL-MCNC: 6.7 G/DL

## 2023-03-29 ENCOUNTER — NON-APPOINTMENT (OUTPATIENT)
Age: 73
End: 2023-03-29

## 2023-03-29 ENCOUNTER — APPOINTMENT (OUTPATIENT)
Dept: CARDIOLOGY | Facility: CLINIC | Age: 73
End: 2023-03-29
Payer: MEDICARE

## 2023-03-29 VITALS
OXYGEN SATURATION: 95 % | SYSTOLIC BLOOD PRESSURE: 151 MMHG | RESPIRATION RATE: 16 BRPM | HEIGHT: 74 IN | WEIGHT: 239 LBS | DIASTOLIC BLOOD PRESSURE: 89 MMHG | BODY MASS INDEX: 30.67 KG/M2 | HEART RATE: 67 BPM

## 2023-03-29 DIAGNOSIS — E87.6 HYPOKALEMIA: ICD-10-CM

## 2023-03-29 DIAGNOSIS — E78.2 MIXED HYPERLIPIDEMIA: ICD-10-CM

## 2023-03-29 PROCEDURE — 99214 OFFICE O/P EST MOD 30 MIN: CPT

## 2023-03-29 PROCEDURE — 93000 ELECTROCARDIOGRAM COMPLETE: CPT

## 2023-03-29 RX ORDER — EPINEPHRINE 0.3 MG/.3ML
0.3 INJECTION INTRAMUSCULAR
Qty: 2 | Refills: 0 | Status: DISCONTINUED | COMMUNITY
Start: 2022-12-28 | End: 2023-03-29

## 2023-03-29 NOTE — ASSESSMENT
[FreeTextEntry1] : A/P:\par \par *preop clearance\par -may proceed w/ colonoscopy w/o further evaluation.\par -low risk for cardiac events for a low risk procedure.\par \par -OK to stop plavix for 5 days prior to colonoscopy if needed\par & restart when safe from a surgical standpoint.\par -Cont. other meds periop.\par \par Cont. followup in 3 months as scheduled.

## 2023-03-29 NOTE — HISTORY OF PRESENT ILLNESS
[FreeTextEntry1] : 71 y/o\par \par *NSTEMI-s/p PCI OM1 Feb '22, s/p PCI RCA May '20\par *prior suspected ASA allergy May '20, ASA challenge\par neg Mar '22.\par *AS-moderate\par *HTN\par \par here for preop clearance for screening colonoscopy.\par Reports viral gastroenteritis w/ vomiting, diarrhea & hypoK\par resolved.\par \par No chest pain dysepna, palpitations, syncope, lightheadness.\par Complaint w/ meds.\par \par ECG NSR old infarct no ischemia.

## 2023-04-03 ENCOUNTER — RX RENEWAL (OUTPATIENT)
Age: 73
End: 2023-04-03

## 2023-04-04 ENCOUNTER — APPOINTMENT (OUTPATIENT)
Dept: CARDIOLOGY | Facility: CLINIC | Age: 73
End: 2023-04-04

## 2023-04-06 NOTE — DISCUSSION/SUMMARY
Mentalis Units: 0 [Optimized for Surgery] : the patient is optimized for surgery [Patient Intermediate Risk] : the patient is an intermediate risk [FreeTextEntry1] : Esquivel periop risk score is 1.2 % for a cv event. [Continue] : Continue medications as currently directed [As per surgery] : as per surgery

## 2023-04-26 ENCOUNTER — APPOINTMENT (OUTPATIENT)
Dept: INTERNAL MEDICINE | Facility: CLINIC | Age: 73
End: 2023-04-26

## 2023-05-04 NOTE — PATIENT PROFILE ADULT - NSPROEXTENSIONSOFSELF_GEN_A_NUR
Called pt and informed pt that even though we have sent an appeal for her depacon it does NOT guarantee that her infusion will be covered and it is up to her insurance to either approve or deny the infusion and she stated she understands this.  Pt requested a copy of the appeal letter be sent to her via my chart.  Informed her I will send her the letter via my chart.   Advised pt to let us know next week how she is doing and how things went with the infusion and if it provided any HA relief and she stated she will let us know.    eyeglasses

## 2023-05-19 LAB
ALBUMIN SERPL ELPH-MCNC: 4.2 G/DL
ALP BLD-CCNC: 69 U/L
ALT SERPL-CCNC: 18 U/L
AST SERPL-CCNC: 15 U/L
BILIRUB DIRECT SERPL-MCNC: 0.2 MG/DL
BILIRUB INDIRECT SERPL-MCNC: 0.6 MG/DL
BILIRUB SERPL-MCNC: 0.8 MG/DL
PROT SERPL-MCNC: 6.6 G/DL

## 2023-05-22 ENCOUNTER — NON-APPOINTMENT (OUTPATIENT)
Age: 73
End: 2023-05-22

## 2023-05-23 ENCOUNTER — NON-APPOINTMENT (OUTPATIENT)
Age: 73
End: 2023-05-23

## 2023-06-01 ENCOUNTER — RX RENEWAL (OUTPATIENT)
Age: 73
End: 2023-06-01

## 2023-06-13 ENCOUNTER — RX RENEWAL (OUTPATIENT)
Age: 73
End: 2023-06-13

## 2023-06-13 ENCOUNTER — APPOINTMENT (OUTPATIENT)
Dept: FAMILY MEDICINE | Facility: CLINIC | Age: 73
End: 2023-06-13

## 2023-06-13 RX ORDER — ROSUVASTATIN CALCIUM 40 MG/1
40 TABLET, FILM COATED ORAL DAILY
Qty: 90 | Refills: 2 | Status: ACTIVE | COMMUNITY
Start: 2022-04-11 | End: 1900-01-01

## 2023-06-15 RX ORDER — HYDRALAZINE HYDROCHLORIDE 25 MG/1
25 TABLET ORAL TWICE DAILY
Qty: 60 | Refills: 3 | Status: DISCONTINUED | COMMUNITY
Start: 2023-06-15 | End: 2023-06-15

## 2023-07-03 ENCOUNTER — APPOINTMENT (OUTPATIENT)
Dept: ORTHOPEDIC SURGERY | Facility: CLINIC | Age: 73
End: 2023-07-03

## 2023-07-03 NOTE — PHYSICAL EXAM
[de-identified] : General Appearance: normal without acute distress\par Mental: Alert and oriented x 3\par Psych/affect: appropriate, cooperative\par Gait: Normal gait\par \par B/l Hips: Normal ROM without pain on IR/ER\par \par Left knee\par Inspection: Mild effusion, no erythema.\par Wounds: none.\par Alignment: Neutral\par Palpation: tender to palpation at medial joint line\par ROM active (in degrees): 0-110 with crepitus & pain through the arc of motion\par Ligamentous laxity: all ligaments appear stable, negative ant. drawer test, negative post. drawer test, stable to varus stress test, stable to valgus stress test. Negative Lachman's test\par Meniscal Test: Negative McMurrays, negative Armando.\par Muscle Test: good quad strength. [de-identified] : 12/6/2022–left knee xrays, standing AP/Lateral and Merchant films, and 45 degree PA standing view are reviewed and demonstrate diffuse tricompartmental degenerative arthritis, medial joint space narrowing, marginal osteophytes, bone on bone with sclerosis, patellofemoral joint space narrowing, peripheral osteophytes

## 2023-07-03 NOTE — PROCEDURE
[de-identified] : 12/22/2022–Left knee Injection -Orthovisc\par 12/06/22–left knee injection - Steroid

## 2023-07-03 NOTE — HISTORY OF PRESENT ILLNESS
[de-identified] : 7/3/2023–\par \par 12/6/2022–patient presents for left knee pain which is been going on many years.  States he has trouble walking due to the pain especially going downstairs.  Used to do martial arts but can get off the mat so he stopped doing it.  Does use a stationary bike.  States the pain is mostly in the anterior medial aspect of his knee.  Does have some buckling at times.  Using a sleeve which helps.  Not taking anything for the pain.  Previously has received steroid and gel injections from Dr. Tabor, last was probably 2 to 3 years ago which she states helped for at least a few months.  Is on Brilinta and aspirin, has 2 stents for CAD, recently diagnosed with diabetes, A1c 6.5, history of NSTEMI and BARRERA.

## 2023-07-03 NOTE — DISCUSSION/SUMMARY
[de-identified] : Left knee end-stage osteoarthritis\par \par Extensive discussion of the natural history of this disease was had with the patient.  We discussed the treatment options ranging from conservative therapy which includes anti-inflammatories, steroid injections, physical therapy, weight loss, and activity modification.  We did discuss that the ultimate treatment for arthritis is a joint replacement.  At this time we will continue conservative treatment as he feels he is not quite at the point of needing a replacement..  At this time he elected for steroid injection.  We will order gel injections since they have worked in the past.  Patient quired about PRP injections.  We did discuss that although we offer them, I do not believe he will be beneficial in end-stage osteoarthritis.  We will call him once they are ready.

## 2023-07-11 ENCOUNTER — APPOINTMENT (OUTPATIENT)
Dept: INTERNAL MEDICINE | Facility: CLINIC | Age: 73
End: 2023-07-11
Payer: MEDICARE

## 2023-07-11 VITALS
TEMPERATURE: 98.8 F | HEART RATE: 57 BPM | BODY MASS INDEX: 32.34 KG/M2 | WEIGHT: 252 LBS | OXYGEN SATURATION: 97 % | HEIGHT: 74 IN | DIASTOLIC BLOOD PRESSURE: 74 MMHG | SYSTOLIC BLOOD PRESSURE: 118 MMHG

## 2023-07-11 DIAGNOSIS — F32.A DEPRESSION, UNSPECIFIED: ICD-10-CM

## 2023-07-11 DIAGNOSIS — K43.9 VENTRAL HERNIA W/OUT OBSTRUCTION OR GANGRENE: ICD-10-CM

## 2023-07-11 DIAGNOSIS — R61 GENERALIZED HYPERHIDROSIS: ICD-10-CM

## 2023-07-11 PROBLEM — E87.6 HYPOKALEMIA: Status: ACTIVE | Noted: 2022-12-13

## 2023-07-11 PROBLEM — E78.2 HYPERLIPIDEMIA, MIXED: Status: ACTIVE | Noted: 2020-05-26

## 2023-07-11 LAB
ESTIMATED AVERAGE GLUCOSE: 140 MG/DL
HBA1C MFR BLD HPLC: 6.5 %

## 2023-07-11 PROCEDURE — G0439: CPT

## 2023-07-11 RX ORDER — OMALIZUMAB 202.5 MG/1.4ML
INJECTION, SOLUTION SUBCUTANEOUS
Refills: 0 | Status: ACTIVE | COMMUNITY

## 2023-07-11 RX ORDER — HYALURONATE SODIUM 30 MG/2 ML
30 SYRINGE (ML) INTRAARTICULAR
Qty: 3 | Refills: 0 | Status: DISCONTINUED | OUTPATIENT
Start: 2022-12-06 | End: 2023-07-11

## 2023-07-11 NOTE — HEALTH RISK ASSESSMENT
[Yes] : Yes [4 or more  times a week (4 pts)] : 4 or more  times a week (4 points) [1 or 2 (0 pts)] : 1 or 2 (0 points) [Never (0 pts)] : Never (0 points) [No] : In the past 12 months have you used drugs other than those required for medical reasons? No [0] : 2) Feeling down, depressed, or hopeless: Not at all (0) [Patient reported colonoscopy was normal] : Patient reported colonoscopy was normal [Never] : Never [FZA3Uairs] : 0 [EyeExamDate] : 05/23 [ColonoscopyDate] : 03/23 [ColonoscopyComments] :

## 2023-07-11 NOTE — ASSESSMENT
[FreeTextEntry1] : 1.HM: Patient counseled regarding recommendations for vaccines, seat belt safety, distracted driving, diet and exercise and all preventative screening.\par Patient will follow up with Urology for PSA testing and GI for colonoscopy.\par \par 2.DM-2: with hba1c still at 6.5, start on metformin 500 mg bid, Went over instructions and side effects of medication.\par f/u hba1c and weight in 3-4 months. \par Pt advised to keep diabetic diet, decrease carbs and increase dietary protein intake.\par Exercise as tolerated 3-4 times a week.\par \par 3.Excessive sweating: referred to dermatology.\par \par 4.Ventral hernia: given referral to general surgeon at this time. \par \par 5.HTN: c/w chlorthalidone, carvedilol. Check blood pressure daily, if greater than 150/90, call MD. Keep 2 gm low sodium diet, exercise as tolerated.\par \par

## 2023-07-11 NOTE — HISTORY OF PRESENT ILLNESS
[FreeTextEntry1] : Patient comes in for an annual wellness visit.\par  [de-identified] : KASSY KRAMER is a 73 year M who comes in for an annual physical exam.\par Pt did see Cardio recently for preop clearance for colonoscopy. Pt notes colonoscopy done by  and normal. \par Pt notes recent weight gain as not able to restrict diet ad portion control. Recent hba1c still elevated at 6.5.\par Pt with hx of angioedema and follows with Dr.Gadi Saldivar and receives Xolair treatment. \par Pt noticed hernia on abdomen that has been noticeable after weight gain. Also with excessive sweating at times at base of neck.\par Patient denies any cp, sob,abdominal pain, nausea, vomiting, palpitations, fever, chills, constipation, diarrhea.\par

## 2023-07-13 ENCOUNTER — NON-APPOINTMENT (OUTPATIENT)
Age: 73
End: 2023-07-13

## 2023-08-15 ENCOUNTER — RX RENEWAL (OUTPATIENT)
Age: 73
End: 2023-08-15

## 2023-09-05 ENCOUNTER — NON-APPOINTMENT (OUTPATIENT)
Age: 73
End: 2023-09-05

## 2023-09-05 ENCOUNTER — APPOINTMENT (OUTPATIENT)
Dept: CARDIOLOGY | Facility: CLINIC | Age: 73
End: 2023-09-05
Payer: MEDICARE

## 2023-09-05 VITALS
RESPIRATION RATE: 16 BRPM | HEART RATE: 66 BPM | WEIGHT: 248 LBS | BODY MASS INDEX: 31.83 KG/M2 | DIASTOLIC BLOOD PRESSURE: 80 MMHG | SYSTOLIC BLOOD PRESSURE: 100 MMHG | HEIGHT: 74 IN | OXYGEN SATURATION: 95 %

## 2023-09-05 PROCEDURE — 93000 ELECTROCARDIOGRAM COMPLETE: CPT

## 2023-09-05 PROCEDURE — 99214 OFFICE O/P EST MOD 30 MIN: CPT

## 2023-09-06 NOTE — HISTORY OF PRESENT ILLNESS
[FreeTextEntry1] : 73 y/o  *NSTEMI-s/p PCI OM1 Feb '22, s/p PCI RCA May '20 *prior suspected ASA allergy May '20, ASA challenge neg Mar '22. *AS-moderate *HTN  no chest pain, dyspnea, palpitations/syncope. Started stationary bike, pushups. Compliant w/ meds: Asa 81 mg daily, plavix 75 daily, coreg 25 BID, clorthal. 25 daily zetia 10 g daily crestor 40 daliy.  ECG NSR old infarct no ischemia.  -------------------------------------- PRIOR CARDIAC TESTING: EKG: NSR, LAE, Old inferior MI. Echo: 5/2020, LAE, MV repair with +1 MR, no pericardial effusion., normal LV function LVEF 55-60%.  Cath Feb '22: "JAH to ostial OM1 w/ TIMI3...maintain on ticagrelor, 180 load in lab then 90 BID w/ cilostazol 100 BID (failed ASA desensitization)." LM diffuse disease, LAD diffuse disease, LCx OM1 99% stenosis RCA mid 60% RCA distal 50% (RCA stent noted)  Cardiac Cath: 5/2020, 1 Vessel CAD with inferior hypokinesis. Stent: 5/2020, JAH to RCA --------------------------------------

## 2023-09-06 NOTE — ASSESSMENT
[FreeTextEntry1] : A/P:  *NSTEMI-s/p PCI OM1 Feb '22, s/p PCI RCA May '20 *prior suspected ASA allergy May '20, ASA challenge neg Mar '22. *HTN  Cont. current meds Return in 4 months FLP, LFT b/f visit.

## 2023-11-02 ENCOUNTER — RX RENEWAL (OUTPATIENT)
Age: 73
End: 2023-11-02

## 2023-11-02 RX ORDER — EZETIMIBE 10 MG/1
10 TABLET ORAL
Qty: 90 | Refills: 1 | Status: ACTIVE | COMMUNITY
Start: 2021-02-17 | End: 1900-01-01

## 2023-11-03 ENCOUNTER — RX RENEWAL (OUTPATIENT)
Age: 73
End: 2023-11-03

## 2023-11-03 RX ORDER — FLUOXETINE HYDROCHLORIDE 40 MG/1
40 CAPSULE ORAL
Qty: 90 | Refills: 1 | Status: ACTIVE | COMMUNITY
Start: 2022-11-10 | End: 1900-01-01

## 2023-11-07 ENCOUNTER — APPOINTMENT (OUTPATIENT)
Dept: INTERNAL MEDICINE | Facility: CLINIC | Age: 73
End: 2023-11-07
Payer: MEDICARE

## 2023-11-07 VITALS
SYSTOLIC BLOOD PRESSURE: 118 MMHG | HEIGHT: 78 IN | BODY MASS INDEX: 28.35 KG/M2 | TEMPERATURE: 98.4 F | WEIGHT: 245 LBS | OXYGEN SATURATION: 97 % | HEART RATE: 73 BPM | DIASTOLIC BLOOD PRESSURE: 70 MMHG

## 2023-11-07 DIAGNOSIS — T78.3XXA ANGIONEUROTIC EDEMA, INITIAL ENCOUNTER: ICD-10-CM

## 2023-11-07 DIAGNOSIS — Z23 ENCOUNTER FOR IMMUNIZATION: ICD-10-CM

## 2023-11-07 DIAGNOSIS — R74.01 ELEVATION OF LEVELS OF LIVER TRANSAMINASE LEVELS: ICD-10-CM

## 2023-11-07 PROCEDURE — G0008: CPT

## 2023-11-07 PROCEDURE — 99214 OFFICE O/P EST MOD 30 MIN: CPT | Mod: 25

## 2023-11-07 PROCEDURE — 90662 IIV NO PRSV INCREASED AG IM: CPT

## 2023-11-07 RX ORDER — CHROMIUM 200 MCG
TABLET ORAL
Refills: 0 | Status: ACTIVE | COMMUNITY

## 2023-11-08 RX ORDER — ALLOPURINOL 100 MG/1
100 TABLET ORAL DAILY
Qty: 90 | Refills: 0 | Status: ACTIVE | COMMUNITY
Start: 2021-11-06 | End: 1900-01-01

## 2023-11-08 RX ORDER — FINASTERIDE 5 MG/1
5 TABLET, FILM COATED ORAL
Qty: 90 | Refills: 0 | Status: ACTIVE | COMMUNITY
Start: 2021-11-06 | End: 1900-01-01

## 2023-11-09 LAB
ANION GAP SERPL CALC-SCNC: 16 MMOL/L
BUN SERPL-MCNC: 19 MG/DL
CALCIUM SERPL-MCNC: 9.9 MG/DL
CHLORIDE SERPL-SCNC: 97 MMOL/L
CO2 SERPL-SCNC: 24 MMOL/L
CREAT SERPL-MCNC: 1 MG/DL
EGFR: 80 ML/MIN/1.73M2
ESTIMATED AVERAGE GLUCOSE: 137 MG/DL
GLUCOSE SERPL-MCNC: 99 MG/DL
HBA1C MFR BLD HPLC: 6.4 %
POTASSIUM SERPL-SCNC: 3.7 MMOL/L
PSA SERPL-MCNC: 1.51 NG/ML
SODIUM SERPL-SCNC: 138 MMOL/L

## 2023-11-13 RX ORDER — AZITHROMYCIN 500 MG/1
0 TABLET, FILM COATED ORAL
Refills: 0 | DISCHARGE
Start: 2023-11-13 | End: 2023-11-17

## 2023-11-15 NOTE — ED PROVIDER NOTE - CROS ED GU ALL NEG
pt pw itchy red rash to cheeks x2 days. denies fever, vomiting, diarrhea. Denies PMH, IUTD. Pt awake, alert, interacting appropriately. Pt coloring appropriate, brisk capillary refill noted, easy WOB noted, lungs CTA. UTO BP due to movement. - - -

## 2023-11-21 ENCOUNTER — APPOINTMENT (OUTPATIENT)
Dept: DERMATOLOGY | Facility: CLINIC | Age: 73
End: 2023-11-21

## 2023-11-28 ENCOUNTER — APPOINTMENT (OUTPATIENT)
Dept: INTERNAL MEDICINE | Facility: CLINIC | Age: 73
End: 2023-11-28

## 2023-12-05 ENCOUNTER — INPATIENT (INPATIENT)
Facility: HOSPITAL | Age: 73
LOS: 1 days | Discharge: HOME | DRG: 312 | End: 2023-12-07
Attending: HOSPITALIST | Admitting: HOSPITALIST
Payer: MEDICARE

## 2023-12-05 VITALS — HEIGHT: 73 IN | WEIGHT: 237 LBS

## 2023-12-05 DIAGNOSIS — Z98.890 OTHER SPECIFIED POSTPROCEDURAL STATES: Chronic | ICD-10-CM

## 2023-12-05 DIAGNOSIS — I25.10 ATHEROSCLEROTIC HEART DISEASE OF NATIVE CORONARY ARTERY WITHOUT ANGINA PECTORIS: ICD-10-CM

## 2023-12-05 DIAGNOSIS — E78.5 HYPERLIPIDEMIA, UNSPECIFIED: ICD-10-CM

## 2023-12-05 DIAGNOSIS — Z98.890 OTHER SPECIFIED POSTPROCEDURAL STATES: ICD-10-CM

## 2023-12-05 DIAGNOSIS — R55 SYNCOPE AND COLLAPSE: ICD-10-CM

## 2023-12-05 DIAGNOSIS — Z96.643 PRESENCE OF ARTIFICIAL HIP JOINT, BILATERAL: Chronic | ICD-10-CM

## 2023-12-05 DIAGNOSIS — Z41.9 ENCOUNTER FOR PROCEDURE FOR PURPOSES OTHER THAN REMEDYING HEALTH STATE, UNSPECIFIED: Chronic | ICD-10-CM

## 2023-12-05 DIAGNOSIS — I10 ESSENTIAL (PRIMARY) HYPERTENSION: ICD-10-CM

## 2023-12-05 LAB
ALBUMIN SERPL ELPH-MCNC: 3.6 G/DL — SIGNIFICANT CHANGE UP (ref 3.3–5)
ALBUMIN SERPL ELPH-MCNC: 3.6 G/DL — SIGNIFICANT CHANGE UP (ref 3.3–5)
ALP SERPL-CCNC: 64 U/L — SIGNIFICANT CHANGE UP (ref 40–120)
ALP SERPL-CCNC: 64 U/L — SIGNIFICANT CHANGE UP (ref 40–120)
ALT FLD-CCNC: 27 U/L — SIGNIFICANT CHANGE UP (ref 12–78)
ALT FLD-CCNC: 27 U/L — SIGNIFICANT CHANGE UP (ref 12–78)
ANION GAP SERPL CALC-SCNC: 6 MMOL/L — SIGNIFICANT CHANGE UP (ref 5–17)
ANION GAP SERPL CALC-SCNC: 6 MMOL/L — SIGNIFICANT CHANGE UP (ref 5–17)
APPEARANCE UR: CLEAR — SIGNIFICANT CHANGE UP
APPEARANCE UR: CLEAR — SIGNIFICANT CHANGE UP
APTT BLD: 36.1 SEC — HIGH (ref 24.5–35.6)
APTT BLD: 36.1 SEC — HIGH (ref 24.5–35.6)
AST SERPL-CCNC: 19 U/L — SIGNIFICANT CHANGE UP (ref 15–37)
AST SERPL-CCNC: 19 U/L — SIGNIFICANT CHANGE UP (ref 15–37)
BASOPHILS # BLD AUTO: 0.03 K/UL — SIGNIFICANT CHANGE UP (ref 0–0.2)
BASOPHILS # BLD AUTO: 0.03 K/UL — SIGNIFICANT CHANGE UP (ref 0–0.2)
BASOPHILS NFR BLD AUTO: 0.3 % — SIGNIFICANT CHANGE UP (ref 0–2)
BASOPHILS NFR BLD AUTO: 0.3 % — SIGNIFICANT CHANGE UP (ref 0–2)
BILIRUB SERPL-MCNC: 1.2 MG/DL — SIGNIFICANT CHANGE UP (ref 0.2–1.2)
BILIRUB SERPL-MCNC: 1.2 MG/DL — SIGNIFICANT CHANGE UP (ref 0.2–1.2)
BILIRUB UR-MCNC: NEGATIVE — SIGNIFICANT CHANGE UP
BILIRUB UR-MCNC: NEGATIVE — SIGNIFICANT CHANGE UP
BUN SERPL-MCNC: 17 MG/DL — SIGNIFICANT CHANGE UP (ref 7–23)
BUN SERPL-MCNC: 17 MG/DL — SIGNIFICANT CHANGE UP (ref 7–23)
CALCIUM SERPL-MCNC: 9 MG/DL — SIGNIFICANT CHANGE UP (ref 8.5–10.1)
CALCIUM SERPL-MCNC: 9 MG/DL — SIGNIFICANT CHANGE UP (ref 8.5–10.1)
CHLORIDE SERPL-SCNC: 105 MMOL/L — SIGNIFICANT CHANGE UP (ref 96–108)
CHLORIDE SERPL-SCNC: 105 MMOL/L — SIGNIFICANT CHANGE UP (ref 96–108)
CK SERPL-CCNC: 132 U/L — SIGNIFICANT CHANGE UP (ref 26–308)
CK SERPL-CCNC: 132 U/L — SIGNIFICANT CHANGE UP (ref 26–308)
CO2 SERPL-SCNC: 29 MMOL/L — SIGNIFICANT CHANGE UP (ref 22–31)
CO2 SERPL-SCNC: 29 MMOL/L — SIGNIFICANT CHANGE UP (ref 22–31)
COLOR SPEC: YELLOW — SIGNIFICANT CHANGE UP
COLOR SPEC: YELLOW — SIGNIFICANT CHANGE UP
CREAT SERPL-MCNC: 1.05 MG/DL — SIGNIFICANT CHANGE UP (ref 0.5–1.3)
CREAT SERPL-MCNC: 1.05 MG/DL — SIGNIFICANT CHANGE UP (ref 0.5–1.3)
DIFF PNL FLD: NEGATIVE — SIGNIFICANT CHANGE UP
DIFF PNL FLD: NEGATIVE — SIGNIFICANT CHANGE UP
EGFR: 75 ML/MIN/1.73M2 — SIGNIFICANT CHANGE UP
EGFR: 75 ML/MIN/1.73M2 — SIGNIFICANT CHANGE UP
EOSINOPHIL # BLD AUTO: 0.23 K/UL — SIGNIFICANT CHANGE UP (ref 0–0.5)
EOSINOPHIL # BLD AUTO: 0.23 K/UL — SIGNIFICANT CHANGE UP (ref 0–0.5)
EOSINOPHIL NFR BLD AUTO: 2 % — SIGNIFICANT CHANGE UP (ref 0–6)
EOSINOPHIL NFR BLD AUTO: 2 % — SIGNIFICANT CHANGE UP (ref 0–6)
GLUCOSE BLDC GLUCOMTR-MCNC: 205 MG/DL — HIGH (ref 70–99)
GLUCOSE BLDC GLUCOMTR-MCNC: 205 MG/DL — HIGH (ref 70–99)
GLUCOSE BLDC GLUCOMTR-MCNC: 291 MG/DL — HIGH (ref 70–99)
GLUCOSE BLDC GLUCOMTR-MCNC: 291 MG/DL — HIGH (ref 70–99)
GLUCOSE SERPL-MCNC: 133 MG/DL — HIGH (ref 70–99)
GLUCOSE SERPL-MCNC: 133 MG/DL — HIGH (ref 70–99)
GLUCOSE UR QL: NEGATIVE MG/DL — SIGNIFICANT CHANGE UP
GLUCOSE UR QL: NEGATIVE MG/DL — SIGNIFICANT CHANGE UP
HCT VFR BLD CALC: 44.4 % — SIGNIFICANT CHANGE UP (ref 39–50)
HCT VFR BLD CALC: 44.4 % — SIGNIFICANT CHANGE UP (ref 39–50)
HGB BLD-MCNC: 15.1 G/DL — SIGNIFICANT CHANGE UP (ref 13–17)
HGB BLD-MCNC: 15.1 G/DL — SIGNIFICANT CHANGE UP (ref 13–17)
IMM GRANULOCYTES NFR BLD AUTO: 0.4 % — SIGNIFICANT CHANGE UP (ref 0–0.9)
IMM GRANULOCYTES NFR BLD AUTO: 0.4 % — SIGNIFICANT CHANGE UP (ref 0–0.9)
INR BLD: 1.03 RATIO — SIGNIFICANT CHANGE UP (ref 0.85–1.18)
INR BLD: 1.03 RATIO — SIGNIFICANT CHANGE UP (ref 0.85–1.18)
KETONES UR-MCNC: NEGATIVE MG/DL — SIGNIFICANT CHANGE UP
KETONES UR-MCNC: NEGATIVE MG/DL — SIGNIFICANT CHANGE UP
LEUKOCYTE ESTERASE UR-ACNC: NEGATIVE — SIGNIFICANT CHANGE UP
LEUKOCYTE ESTERASE UR-ACNC: NEGATIVE — SIGNIFICANT CHANGE UP
LYMPHOCYTES # BLD AUTO: 1.67 K/UL — SIGNIFICANT CHANGE UP (ref 1–3.3)
LYMPHOCYTES # BLD AUTO: 1.67 K/UL — SIGNIFICANT CHANGE UP (ref 1–3.3)
LYMPHOCYTES # BLD AUTO: 14.9 % — SIGNIFICANT CHANGE UP (ref 13–44)
LYMPHOCYTES # BLD AUTO: 14.9 % — SIGNIFICANT CHANGE UP (ref 13–44)
MAGNESIUM SERPL-MCNC: 2.3 MG/DL — SIGNIFICANT CHANGE UP (ref 1.6–2.6)
MAGNESIUM SERPL-MCNC: 2.3 MG/DL — SIGNIFICANT CHANGE UP (ref 1.6–2.6)
MCHC RBC-ENTMCNC: 31 PG — SIGNIFICANT CHANGE UP (ref 27–34)
MCHC RBC-ENTMCNC: 31 PG — SIGNIFICANT CHANGE UP (ref 27–34)
MCHC RBC-ENTMCNC: 34 GM/DL — SIGNIFICANT CHANGE UP (ref 32–36)
MCHC RBC-ENTMCNC: 34 GM/DL — SIGNIFICANT CHANGE UP (ref 32–36)
MCV RBC AUTO: 91.2 FL — SIGNIFICANT CHANGE UP (ref 80–100)
MCV RBC AUTO: 91.2 FL — SIGNIFICANT CHANGE UP (ref 80–100)
MONOCYTES # BLD AUTO: 1.35 K/UL — HIGH (ref 0–0.9)
MONOCYTES # BLD AUTO: 1.35 K/UL — HIGH (ref 0–0.9)
MONOCYTES NFR BLD AUTO: 12 % — SIGNIFICANT CHANGE UP (ref 2–14)
MONOCYTES NFR BLD AUTO: 12 % — SIGNIFICANT CHANGE UP (ref 2–14)
NEUTROPHILS # BLD AUTO: 7.91 K/UL — HIGH (ref 1.8–7.4)
NEUTROPHILS # BLD AUTO: 7.91 K/UL — HIGH (ref 1.8–7.4)
NEUTROPHILS NFR BLD AUTO: 70.4 % — SIGNIFICANT CHANGE UP (ref 43–77)
NEUTROPHILS NFR BLD AUTO: 70.4 % — SIGNIFICANT CHANGE UP (ref 43–77)
NITRITE UR-MCNC: NEGATIVE — SIGNIFICANT CHANGE UP
NITRITE UR-MCNC: NEGATIVE — SIGNIFICANT CHANGE UP
PH UR: 7.5 — SIGNIFICANT CHANGE UP (ref 5–8)
PH UR: 7.5 — SIGNIFICANT CHANGE UP (ref 5–8)
PLATELET # BLD AUTO: 196 K/UL — SIGNIFICANT CHANGE UP (ref 150–400)
PLATELET # BLD AUTO: 196 K/UL — SIGNIFICANT CHANGE UP (ref 150–400)
POTASSIUM SERPL-MCNC: 3.5 MMOL/L — SIGNIFICANT CHANGE UP (ref 3.5–5.3)
POTASSIUM SERPL-MCNC: 3.5 MMOL/L — SIGNIFICANT CHANGE UP (ref 3.5–5.3)
POTASSIUM SERPL-SCNC: 3.5 MMOL/L — SIGNIFICANT CHANGE UP (ref 3.5–5.3)
POTASSIUM SERPL-SCNC: 3.5 MMOL/L — SIGNIFICANT CHANGE UP (ref 3.5–5.3)
PROT SERPL-MCNC: 7.6 GM/DL — SIGNIFICANT CHANGE UP (ref 6–8.3)
PROT SERPL-MCNC: 7.6 GM/DL — SIGNIFICANT CHANGE UP (ref 6–8.3)
PROT UR-MCNC: NEGATIVE MG/DL — SIGNIFICANT CHANGE UP
PROT UR-MCNC: NEGATIVE MG/DL — SIGNIFICANT CHANGE UP
PROTHROM AB SERPL-ACNC: 11.6 SEC — SIGNIFICANT CHANGE UP (ref 9.5–13)
PROTHROM AB SERPL-ACNC: 11.6 SEC — SIGNIFICANT CHANGE UP (ref 9.5–13)
RBC # BLD: 4.87 M/UL — SIGNIFICANT CHANGE UP (ref 4.2–5.8)
RBC # BLD: 4.87 M/UL — SIGNIFICANT CHANGE UP (ref 4.2–5.8)
RBC # FLD: 12.8 % — SIGNIFICANT CHANGE UP (ref 10.3–14.5)
RBC # FLD: 12.8 % — SIGNIFICANT CHANGE UP (ref 10.3–14.5)
SODIUM SERPL-SCNC: 140 MMOL/L — SIGNIFICANT CHANGE UP (ref 135–145)
SODIUM SERPL-SCNC: 140 MMOL/L — SIGNIFICANT CHANGE UP (ref 135–145)
SP GR SPEC: 1.01 — SIGNIFICANT CHANGE UP (ref 1–1.03)
SP GR SPEC: 1.01 — SIGNIFICANT CHANGE UP (ref 1–1.03)
TROPONIN I, HIGH SENSITIVITY RESULT: 12.93 NG/L — SIGNIFICANT CHANGE UP
TROPONIN I, HIGH SENSITIVITY RESULT: 12.93 NG/L — SIGNIFICANT CHANGE UP
TROPONIN I, HIGH SENSITIVITY RESULT: 9.44 NG/L — SIGNIFICANT CHANGE UP
TROPONIN I, HIGH SENSITIVITY RESULT: 9.44 NG/L — SIGNIFICANT CHANGE UP
UROBILINOGEN FLD QL: 0.2 MG/DL — SIGNIFICANT CHANGE UP (ref 0.2–1)
UROBILINOGEN FLD QL: 0.2 MG/DL — SIGNIFICANT CHANGE UP (ref 0.2–1)
WBC # BLD: 11.23 K/UL — HIGH (ref 3.8–10.5)
WBC # BLD: 11.23 K/UL — HIGH (ref 3.8–10.5)
WBC # FLD AUTO: 11.23 K/UL — HIGH (ref 3.8–10.5)
WBC # FLD AUTO: 11.23 K/UL — HIGH (ref 3.8–10.5)

## 2023-12-05 PROCEDURE — 99223 1ST HOSP IP/OBS HIGH 75: CPT

## 2023-12-05 PROCEDURE — 93306 TTE W/DOPPLER COMPLETE: CPT | Mod: 26

## 2023-12-05 PROCEDURE — 93306 TTE W/DOPPLER COMPLETE: CPT

## 2023-12-05 PROCEDURE — 72170 X-RAY EXAM OF PELVIS: CPT | Mod: 26

## 2023-12-05 PROCEDURE — 72125 CT NECK SPINE W/O DYE: CPT | Mod: 26,MA

## 2023-12-05 PROCEDURE — 99053 MED SERV 10PM-8AM 24 HR FAC: CPT

## 2023-12-05 PROCEDURE — 85027 COMPLETE CBC AUTOMATED: CPT

## 2023-12-05 PROCEDURE — 94640 AIRWAY INHALATION TREATMENT: CPT

## 2023-12-05 PROCEDURE — 73030 X-RAY EXAM OF SHOULDER: CPT | Mod: 26,LT

## 2023-12-05 PROCEDURE — 36415 COLL VENOUS BLD VENIPUNCTURE: CPT

## 2023-12-05 PROCEDURE — 80061 LIPID PANEL: CPT

## 2023-12-05 PROCEDURE — 99285 EMERGENCY DEPT VISIT HI MDM: CPT

## 2023-12-05 PROCEDURE — 71045 X-RAY EXAM CHEST 1 VIEW: CPT | Mod: 26

## 2023-12-05 PROCEDURE — 93010 ELECTROCARDIOGRAM REPORT: CPT | Mod: 76

## 2023-12-05 PROCEDURE — 78452 HT MUSCLE IMAGE SPECT MULT: CPT

## 2023-12-05 PROCEDURE — 83036 HEMOGLOBIN GLYCOSYLATED A1C: CPT

## 2023-12-05 PROCEDURE — 93017 CV STRESS TEST TRACING ONLY: CPT

## 2023-12-05 PROCEDURE — 80048 BASIC METABOLIC PNL TOTAL CA: CPT

## 2023-12-05 PROCEDURE — 70450 CT HEAD/BRAIN W/O DYE: CPT | Mod: 26,MA

## 2023-12-05 PROCEDURE — A9500: CPT

## 2023-12-05 PROCEDURE — 82962 GLUCOSE BLOOD TEST: CPT

## 2023-12-05 RX ORDER — DEXTROSE 50 % IN WATER 50 %
25 SYRINGE (ML) INTRAVENOUS ONCE
Refills: 0 | Status: DISCONTINUED | OUTPATIENT
Start: 2023-12-05 | End: 2023-12-07

## 2023-12-05 RX ORDER — SODIUM CHLORIDE 9 MG/ML
1000 INJECTION, SOLUTION INTRAVENOUS
Refills: 0 | Status: DISCONTINUED | OUTPATIENT
Start: 2023-12-05 | End: 2023-12-07

## 2023-12-05 RX ORDER — FOLIC ACID 0.8 MG
1 TABLET ORAL
Refills: 0 | DISCHARGE

## 2023-12-05 RX ORDER — ACETAMINOPHEN 500 MG
1000 TABLET ORAL ONCE
Refills: 0 | Status: COMPLETED | OUTPATIENT
Start: 2023-12-05 | End: 2023-12-05

## 2023-12-05 RX ORDER — ROSUVASTATIN CALCIUM 5 MG/1
1 TABLET ORAL
Refills: 0 | DISCHARGE

## 2023-12-05 RX ORDER — ALLOPURINOL 300 MG
1 TABLET ORAL
Qty: 0 | Refills: 0 | DISCHARGE

## 2023-12-05 RX ORDER — DEXTROSE 50 % IN WATER 50 %
12.5 SYRINGE (ML) INTRAVENOUS ONCE
Refills: 0 | Status: DISCONTINUED | OUTPATIENT
Start: 2023-12-05 | End: 2023-12-07

## 2023-12-05 RX ORDER — CLOPIDOGREL BISULFATE 75 MG/1
75 TABLET, FILM COATED ORAL DAILY
Refills: 0 | Status: DISCONTINUED | OUTPATIENT
Start: 2023-12-05 | End: 2023-12-07

## 2023-12-05 RX ORDER — FLUTICASONE PROPIONATE 220 MCG
2 AEROSOL WITH ADAPTER (GRAM) INHALATION
Refills: 0 | DISCHARGE

## 2023-12-05 RX ORDER — CARVEDILOL PHOSPHATE 80 MG/1
1 CAPSULE, EXTENDED RELEASE ORAL
Refills: 0 | DISCHARGE

## 2023-12-05 RX ORDER — FLUOXETINE HCL 10 MG
40 CAPSULE ORAL DAILY
Refills: 0 | Status: DISCONTINUED | OUTPATIENT
Start: 2023-12-05 | End: 2023-12-07

## 2023-12-05 RX ORDER — SODIUM CHLORIDE 9 MG/ML
500 INJECTION INTRAMUSCULAR; INTRAVENOUS; SUBCUTANEOUS ONCE
Refills: 0 | Status: COMPLETED | OUTPATIENT
Start: 2023-12-05 | End: 2023-12-05

## 2023-12-05 RX ORDER — CHLORTHALIDONE 50 MG
1 TABLET ORAL
Refills: 0 | DISCHARGE

## 2023-12-05 RX ORDER — FLUOXETINE HCL 10 MG
1 CAPSULE ORAL
Refills: 0 | DISCHARGE

## 2023-12-05 RX ORDER — ALLOPURINOL 300 MG
100 TABLET ORAL DAILY
Refills: 0 | Status: DISCONTINUED | OUTPATIENT
Start: 2023-12-05 | End: 2023-12-07

## 2023-12-05 RX ORDER — POTASSIUM CHLORIDE 20 MEQ
20 PACKET (EA) ORAL ONCE
Refills: 0 | Status: COMPLETED | OUTPATIENT
Start: 2023-12-05 | End: 2023-12-05

## 2023-12-05 RX ORDER — DEXTROSE 50 % IN WATER 50 %
15 SYRINGE (ML) INTRAVENOUS ONCE
Refills: 0 | Status: DISCONTINUED | OUTPATIENT
Start: 2023-12-05 | End: 2023-12-07

## 2023-12-05 RX ORDER — EZETIMIBE 10 MG/1
1 TABLET ORAL
Qty: 0 | Refills: 0 | DISCHARGE

## 2023-12-05 RX ORDER — CARVEDILOL PHOSPHATE 80 MG/1
25 CAPSULE, EXTENDED RELEASE ORAL AT BEDTIME
Refills: 0 | Status: DISCONTINUED | OUTPATIENT
Start: 2023-12-05 | End: 2023-12-07

## 2023-12-05 RX ORDER — GLUCAGON INJECTION, SOLUTION 0.5 MG/.1ML
1 INJECTION, SOLUTION SUBCUTANEOUS ONCE
Refills: 0 | Status: DISCONTINUED | OUTPATIENT
Start: 2023-12-05 | End: 2023-12-07

## 2023-12-05 RX ORDER — ACETAMINOPHEN 500 MG
650 TABLET ORAL ONCE
Refills: 0 | Status: COMPLETED | OUTPATIENT
Start: 2023-12-05 | End: 2023-12-06

## 2023-12-05 RX ORDER — INSULIN LISPRO 100/ML
VIAL (ML) SUBCUTANEOUS
Refills: 0 | Status: DISCONTINUED | OUTPATIENT
Start: 2023-12-05 | End: 2023-12-07

## 2023-12-05 RX ORDER — ASPIRIN/CALCIUM CARB/MAGNESIUM 324 MG
81 TABLET ORAL DAILY
Refills: 0 | Status: DISCONTINUED | OUTPATIENT
Start: 2023-12-05 | End: 2023-12-07

## 2023-12-05 RX ORDER — SODIUM CHLORIDE 9 MG/ML
1000 INJECTION INTRAMUSCULAR; INTRAVENOUS; SUBCUTANEOUS
Refills: 0 | Status: DISCONTINUED | OUTPATIENT
Start: 2023-12-05 | End: 2023-12-07

## 2023-12-05 RX ORDER — POTASSIUM CHLORIDE 20 MEQ
20 PACKET (EA) ORAL DAILY
Refills: 0 | Status: DISCONTINUED | OUTPATIENT
Start: 2023-12-05 | End: 2023-12-07

## 2023-12-05 RX ORDER — FOLIC ACID 0.8 MG
1 TABLET ORAL DAILY
Refills: 0 | Status: DISCONTINUED | OUTPATIENT
Start: 2023-12-05 | End: 2023-12-07

## 2023-12-05 RX ORDER — ONDANSETRON 8 MG/1
4 TABLET, FILM COATED ORAL EVERY 8 HOURS
Refills: 0 | Status: DISCONTINUED | OUTPATIENT
Start: 2023-12-05 | End: 2023-12-07

## 2023-12-05 RX ORDER — METFORMIN HYDROCHLORIDE 850 MG/1
1 TABLET ORAL
Refills: 0 | DISCHARGE

## 2023-12-05 RX ORDER — ALBUTEROL 90 UG/1
2 AEROSOL, METERED ORAL
Refills: 0 | DISCHARGE

## 2023-12-05 RX ORDER — EZETIMIBE 10 MG/1
1 TABLET ORAL
Refills: 0 | DISCHARGE

## 2023-12-05 RX ORDER — ATORVASTATIN CALCIUM 80 MG/1
80 TABLET, FILM COATED ORAL AT BEDTIME
Refills: 0 | Status: DISCONTINUED | OUTPATIENT
Start: 2023-12-05 | End: 2023-12-07

## 2023-12-05 RX ORDER — POTASSIUM CHLORIDE 20 MEQ
1 PACKET (EA) ORAL
Refills: 0 | DISCHARGE

## 2023-12-05 RX ORDER — CARVEDILOL PHOSPHATE 80 MG/1
0.5 CAPSULE, EXTENDED RELEASE ORAL
Refills: 0 | DISCHARGE

## 2023-12-05 RX ORDER — CARVEDILOL PHOSPHATE 80 MG/1
12.5 CAPSULE, EXTENDED RELEASE ORAL DAILY
Refills: 0 | Status: DISCONTINUED | OUTPATIENT
Start: 2023-12-05 | End: 2023-12-07

## 2023-12-05 RX ORDER — FINASTERIDE 5 MG/1
5 TABLET, FILM COATED ORAL DAILY
Refills: 0 | Status: DISCONTINUED | OUTPATIENT
Start: 2023-12-05 | End: 2023-12-07

## 2023-12-05 RX ORDER — CLOPIDOGREL BISULFATE 75 MG/1
1 TABLET, FILM COATED ORAL
Refills: 0 | DISCHARGE

## 2023-12-05 RX ORDER — FLUOXETINE HCL 10 MG
1 CAPSULE ORAL
Qty: 0 | Refills: 0 | DISCHARGE

## 2023-12-05 RX ORDER — LANOLIN ALCOHOL/MO/W.PET/CERES
3 CREAM (GRAM) TOPICAL AT BEDTIME
Refills: 0 | Status: DISCONTINUED | OUTPATIENT
Start: 2023-12-05 | End: 2023-12-07

## 2023-12-05 RX ORDER — CHLORTHALIDONE 50 MG
0.5 TABLET ORAL
Refills: 0 | DISCHARGE

## 2023-12-05 RX ORDER — ASPIRIN/CALCIUM CARB/MAGNESIUM 324 MG
1 TABLET ORAL
Refills: 0 | DISCHARGE

## 2023-12-05 RX ORDER — FINASTERIDE 5 MG/1
1 TABLET, FILM COATED ORAL
Qty: 0 | Refills: 0 | DISCHARGE

## 2023-12-05 RX ORDER — ALBUTEROL 90 UG/1
2 AEROSOL, METERED ORAL EVERY 6 HOURS
Refills: 0 | Status: DISCONTINUED | OUTPATIENT
Start: 2023-12-05 | End: 2023-12-07

## 2023-12-05 RX ADMIN — Medication 20 MILLIEQUIVALENT(S): at 16:04

## 2023-12-05 RX ADMIN — Medication 3 MILLIGRAM(S): at 22:16

## 2023-12-05 RX ADMIN — Medication 2: at 18:41

## 2023-12-05 RX ADMIN — Medication 400 MILLIGRAM(S): at 16:05

## 2023-12-05 RX ADMIN — ATORVASTATIN CALCIUM 80 MILLIGRAM(S): 80 TABLET, FILM COATED ORAL at 22:16

## 2023-12-05 RX ADMIN — SODIUM CHLORIDE 500 MILLILITER(S): 9 INJECTION INTRAMUSCULAR; INTRAVENOUS; SUBCUTANEOUS at 08:45

## 2023-12-05 RX ADMIN — CARVEDILOL PHOSPHATE 25 MILLIGRAM(S): 80 CAPSULE, EXTENDED RELEASE ORAL at 22:16

## 2023-12-05 RX ADMIN — SODIUM CHLORIDE 50 MILLILITER(S): 9 INJECTION INTRAMUSCULAR; INTRAVENOUS; SUBCUTANEOUS at 22:16

## 2023-12-05 RX ADMIN — Medication 40 MILLIGRAM(S): at 12:35

## 2023-12-05 RX ADMIN — Medication 20 MILLIEQUIVALENT(S): at 22:39

## 2023-12-05 NOTE — ED PROVIDER NOTE - OBJECTIVE STATEMENT
72 y/o male with a PMHx of bladder stones, BPH, CAD, hematuria, HTN, HLD, mitral valve replacement, sleep apnea, stroke presents to the ED s/p syncope. Pt had 3 cocktails yesterday. Pt report around 9pm last night he opened the fridge, lost his balance and fell backwards. +LOC. Pt c/o left shoulder pain. Denies HA, n/v. Pt reports over the last few weeks he has had episodes of lightheadedness when he gets up quickly. No other complaints at this time. 74 y/o male with a PMHx of bladder stones, BPH, CAD, hematuria, HTN, HLD, mitral valve replacement, sleep apnea, stroke presents to the ED s/p syncope. Pt had 3 cocktails yesterday. Pt report around 9pm last night he opened the fridge, lost his balance and fell backwards. +LOC. Pt c/o left shoulder pain. Denies HA, n/v. Pt reports over the last few weeks he has had episodes of lightheadedness when he gets up quickly. No other complaints at this time. 72 y/o male with a PMHx of bladder stones, BPH, CAD s/p stents x2 on Plavix and 81mg ASA, hematuria, HTN, HLD, mitral valve repair, sleep apnea, stroke presents to the ED s/p syncope. Pt had 3 cocktails yesterday. Pt reports around 9pm last night he opened the fridge and had a syncopal episode. Duration of LOC unknown. Pt c/o left shoulder pain and neck pain. Denies HA, n/v, CP. Pt with URI with dry cough x3 weeks. Pt reports over the last few weeks he has had episodes of lightheadedness when he gets up quickly. No other complaints at this time. Cardio: Dr. Ortiz. PCP: Dr. Pelletier 74 y/o male with a PMHx of bladder stones, BPH, CAD s/p stents x2 on Plavix and 81mg ASA, hematuria, HTN, HLD, mitral valve repair, sleep apnea, stroke presents to the ED s/p syncope. Pt had 3 cocktails yesterday. Pt reports around 9pm last night he opened the fridge and had a syncopal episode. Duration of LOC unknown. Pt c/o left shoulder pain and neck pain. Denies HA, n/v, CP. Pt with URI with dry cough x3 weeks. Pt reports over the last few weeks he has had episodes of lightheadedness when he gets up quickly. No other complaints at this time. Cardio: Dr. Ortiz. PCP: Dr. Pelletier 74 y/o male with a PMHx of bladder stones, BPH, CAD s/p stents x2 on Plavix and 81mg ASA, hematuria, HTN, HLD, mitral valve repair, sleep apnea, stroke presents to the ED s/p syncope. Pt had 3 cocktails yesterday. Pt reports around 9pm last night he opened the fridge and had a syncopal episode, doesn't remember falling. Duration of LOC unknown. Pt c/o left shoulder pain and neck pain. Denies HA, n/v, CP. Pt with URI with dry cough x3 weeks. Pt reports over the last few weeks he has had episodes of lightheadedness when he gets up quickly. No other complaints at this time.   Cardio: Dr. Ortiz. PCP: Dr. Pelletier 72 y/o male with a PMHx of bladder stones, BPH, CAD s/p stents x2 on Plavix and 81mg ASA, hematuria, HTN, HLD, mitral valve repair, sleep apnea, stroke presents to the ED s/p syncope. Pt had 3 cocktails yesterday. Pt reports around 9pm last night he opened the fridge and had a syncopal episode, doesn't remember falling. Duration of LOC unknown. Pt c/o left shoulder pain and neck pain. Denies HA, n/v, CP. Pt with URI with dry cough x3 weeks. Pt reports over the last few weeks he has had episodes of lightheadedness when he gets up quickly. No other complaints at this time.   Cardio: Dr. Ortiz. PCP: Dr. Pelletier

## 2023-12-05 NOTE — ED PROVIDER NOTE - CONSTITUTIONAL, MLM
normal... Well appearing, awake, alert, oriented to person, place, time/situation and in no apparent distress. Older white male, well appearing, awake, alert, oriented to person, place, time/situation and in no apparent distress.

## 2023-12-05 NOTE — PATIENT PROFILE ADULT - FALL HARM RISK - HARM RISK INTERVENTIONS
Communicate Risk of Fall with Harm to all staff/Reinforce activity limits and safety measures with patient and family/Tailored Fall Risk Interventions/Use of alarms - bed, chair and/or voice tab/Bed in lowest position, wheels locked, appropriate side rails in place/Call bell, personal items and telephone in reach/Instruct patient to call for assistance before getting out of bed or chair/Non-slip footwear when patient is out of bed/Trade to call system/Physically safe environment - no spills, clutter or unnecessary equipment/Purposeful Proactive Rounding/Room/bathroom lighting operational, light cord in reach Communicate Risk of Fall with Harm to all staff/Reinforce activity limits and safety measures with patient and family/Tailored Fall Risk Interventions/Use of alarms - bed, chair and/or voice tab/Bed in lowest position, wheels locked, appropriate side rails in place/Call bell, personal items and telephone in reach/Instruct patient to call for assistance before getting out of bed or chair/Non-slip footwear when patient is out of bed/[place] to call system/Physically safe environment - no spills, clutter or unnecessary equipment/Purposeful Proactive Rounding/Room/bathroom lighting operational, light cord in reach

## 2023-12-05 NOTE — CONSULT NOTE ADULT - SUBJECTIVE AND OBJECTIVE BOX
CHIEF COMPLAINT: passed at home  last night     HPI: 73 year old male with hx of  HTN HLD CAD PCI  May 2020 , 2022 Om1 stent , Prior MV repair  CVA  came to ER with complain that patient passed out at home last night while he was opening refrigerator , sustained  injury to left shoulder , patient says he did have 3 drinks an hour before the event , patient denies any symptoms prior to passing out ,and after waking , other left shoulder injury pain , no chest pain or shortness of breath or dizziness   , no prior syncopal episode , Patient says he was suffering with cough for last 3 weeks , did take antibiotics inhalers without much improvement , patient says he was not coughing prior to the event ,     patient ekg showed LBBB which is new compared to prior , normal troponin       PAST MEDICAL & SURGICAL HISTORY:  BPH (benign prostatic hyperplasia)      HTN (hypertension)      Stroke        CAD, multiple vessel  with 2 stents 2020      HLD (hyperlipidemia)      Hematuria      Bladder stones      Sleep apnea  uses cpap      H/O mitral valve repair        S/P bilateral hip replacements   &       Elective surgery  cardiac stents 2020          Allergies    NSAIDs (Other)  amlodipine (Anaphylaxis)  lisinopril (Anaphylaxis)  Rosuvastatin Calcium (Swelling)  aspirin (Angioedema (Mild))    Intolerances        SOCIAL HISTORY:  non smoker       FAMILY HISTORY:  FH: CAD (coronary artery disease)        MEDICATIONS:Home Medications:  allopurinol 100 mg oral tablet: 1 tab(s) orally once a day (2022 11:34)  ezetimibe 10 mg oral tablet: 1 tab(s) orally once a day (2022 11:34)  finasteride 5 mg oral tablet: 1 tab(s) orally once a day (2022 11:34)  FLUoxetine 20 mg oral capsule: 1 cap(s) orally once a day (2022 11:34)  oxybutynin 5 mg oral tablet: 2 tab(s) orally once a day (in the morning) (2022 11:34)  rosuvastatin 40 mg oral tablet: 1 tab(s) orally once a day (2022 11:34)    MEDICATIONS  (STANDING):  predniSONE   Tablet 40 milliGRAM(s) Oral Once    MEDICATIONS  (PRN):      REVIEW OF SYSTEMS:  as above   CONSTITUTIONAL: No weakness, fevers or chills  EYES/ENT: No visual changes;  No vertigo or throat pain   NECK: No pain or stiffness  RESPIRATORY: No cough, wheezing, hemoptysis; No shortness of breath  CARDIOVASCULAR: No chest pain or palpitations  GASTROINTESTINAL: No abdominal or epigastric pain. No nausea, vomiting, or hematemesis; No diarrhea or constipation. No melena or hematochezia.  GENITOURINARY: No dysuria, frequency or hematuria  NEUROLOGICAL: No numbness or weakness  SKIN: No itching, burning, rashes, or lesions   All other review of systems is negative unless indicated above    Vital Signs Last 24 Hrs  T(C): 36.9 (05 Dec 2023 06:16), Max: 36.9 (05 Dec 2023 06:16)  T(F): 98.4 (05 Dec 2023 06:16), Max: 98.4 (05 Dec 2023 06:16)  HR: 69 (05 Dec 2023 06:16) (69 - 69)  BP: 157/88 (05 Dec 2023 06:16) (157/88 - 157/88)  BP(mean): 108 (05 Dec 2023 06:16) (108 - 108)  RR: --  SpO2: 97% (05 Dec 2023 06:16) (97% - 97%)    Parameters below as of 05 Dec 2023 06:16  Patient On (Oxygen Delivery Method): room air        I&O's Summary      PHYSICAL EXAM:    Constitutional: NAD, awake and alert, well-developed  HEENT: PERR, EOMI,  No oral cyananosis.  Neck:  supple,  No JVD  Respiratory: Breath sounds are clear bilaterally, No wheezing, rales or rhonchi  Cardiovascular: S1 and S2, regular rate and rhythm, no Murmurs, gallops or rubs  Gastrointestinal: Bowel Sounds present, soft, nontender.   Extremities: No peripheral edema. No clubbing or cyanosis.  Vascular: 2+ peripheral pulses  Neurological: A/O x 3, no focal deficits  Musculoskeletal: no calf tenderness.  Skin: No rashes.      LABS: All Labs Reviewed:                        15. )-----------( 196      ( 05 Dec 2023 06:36 )             44.4     05 Dec 2023 06:36    140    |  105    |  17     ----------------------------<  133    3.5     |  29     |  1.05     Ca    9.0        05 Dec 2023 06:36  Mg     2.3       05 Dec 2023 06:36    TPro  7.6    /  Alb  3.6    /  TBili  1.2    /  DBili  x      /  AST  19     /  ALT  27     /  AlkPhos  64     05 Dec 2023 06:36    PT/INR - ( 05 Dec 2023 06:36 )   PT: 11.6 sec;   INR: 1.03 ratio         PTT - ( 05 Dec 2023 06:36 )  PTT:36.1 sec  CARDIAC MARKERS ( 05 Dec 2023 06:36 )  x     / x     / 132 U/L / x     / x        - TroponinI hsT: <-12.93    Blood Culture:   < from: Cardiac Catheterization (22 @ 18:58) >  Cardiac Arteries and Lesion Findings    LMCA: Mild diffuse atherosclerosis    LAD: Diffuse mild atherosclerosis  Engaged with JL4 with prolapse of catheter    LCx:     1st Ob Marie% stenosis reduced to 1%.     Devices used     * 6FR JL 4.0 LAUNCHER     * .014 Asahi [id number]     * TELESCOPE     * Euphora SC     Diameter: 2.5 mm. Length: 15 mm. Total duration: 10 sec.1 inflation(s).   to a max pressure of: 12 JACINTO.     * Resolute JOHN JAH     Diameter: 3 mm. Length: 15 mm. Total duration: 9 sec.1 inflation(s). to   a max pressure of: 12 JACINTO.    RCA: Engaged with Ultra however, limited catheter purchase     Mid RCA: 60% stenosis.Pre procedure LISA III flow was noted.     Comments:Locatedin mid portion of the vessel.     Devices used     Dist RCA: 50% stenosis.     Comments:Lesion located prior to the stent, in stent restenosis     Devices used     Coronary tree     Dominance: Right     Impression     Diagnostic Conclusions   Critical OM1 of large territory as culprit lesion of patient's NSTEMI   presentation.     Interventional Conclusions     Successful JAH placement to ostial OM1 lesion resulting in LISA 3 flow   and   no chest pain at the end of the procedure.     Recommendations     Maintain on ticagrelor (180 mg load in lad) with maintenance 90 mg bid,   with cilostazol 100 mg bid which patient was on in the past. Patient with   aspirin allergy with failed desensitisation in the past. Recommend   aggressive medical management of CAD as per ACC/AHA guidelines, including   lifestyle changes.    Estimated Blood Loss:5ml.    Complications:  No complication.     Signatures    < end of copied text >  < from: TTE Echo Complete w/o Contrast w/ Doppler (22 @ 16:31) >     Mild concentric left ventricular hypertrophy is present.   The left ventricle is normal in size.   There are mild regional wall motion abnormalities:   mid anterolateral & inferolateral segments are hypokinetic.   Overall systolic function is preserved. EF=55-60 %.   The left atrium is mildly dilated.   Mild (1+) aortic regurgitation.   Mild (1+) mitral regurgitation.   Mitral valve repair is noted with normal mitral valve function.   Mild (1+) tricuspid valve regurgitation. Normal PA systolic pressures.   Mild pulmonic valvular regurgitation.   Aortic root & ascending aorta are mildly dilated.      < end of copied text >        RADIOLOGY/EKG:    sinus rhythm LBBB  ( new changes compared to previous ekg )      CHIEF COMPLAINT: passed at home  last night     HPI: 73 year old male with hx of  HTN HLD CAD PCI  May 2020 , 2022 Om1 stent , Prior MV repair  CVA  came to ER with complain that patient passed out at home last night while he was opening refrigerator , sustained  injury to left shoulder , patient says he did have 3 drinks an hour before the event , patient denies any symptoms prior to passing out ,and after waking , other left shoulder injury pain , no chest pain or shortness of breath or dizziness   , no prior syncopal episode , Patient says he was suffering with cough for last 3 weeks , did take antibiotics inhalers without much improvement , patient says he was not coughing prior to the event ,     patient ekg showed LBBB which is new compared to prior , normal troponin       PAST MEDICAL & SURGICAL HISTORY:  BPH (benign prostatic hyperplasia)      HTN (hypertension)      Stroke        CAD, multiple vessel  with 2 stents 2020      HLD (hyperlipidemia)      Hematuria      Bladder stones      Sleep apnea  uses cpap      H/O mitral valve repair        S/P bilateral hip replacements   &       Elective surgery  cardiac stents 2020          Allergies    NSAIDs (Other)  amlodipine (Anaphylaxis)  lisinopril (Anaphylaxis)  Rosuvastatin Calcium (Swelling)  aspirin (Angioedema (Mild))    Intolerances        SOCIAL HISTORY:  non smoker       FAMILY HISTORY:  FH: CAD (coronary artery disease)        MEDICATIONS:Home Medications:  allopurinol 100 mg oral tablet: 1 tab(s) orally once a day (2022 11:34)  ezetimibe 10 mg oral tablet: 1 tab(s) orally once a day (2022 11:34)  finasteride 5 mg oral tablet: 1 tab(s) orally once a day (2022 11:34)  FLUoxetine 20 mg oral capsule: 1 cap(s) orally once a day (2022 11:34)  oxybutynin 5 mg oral tablet: 2 tab(s) orally once a day (in the morning) (2022 11:34)  rosuvastatin 40 mg oral tablet: 1 tab(s) orally once a day (2022 11:34)    MEDICATIONS  (STANDING):  predniSONE   Tablet 40 milliGRAM(s) Oral Once    MEDICATIONS  (PRN):      REVIEW OF SYSTEMS:  as above   CONSTITUTIONAL: No weakness, fevers or chills  EYES/ENT: No visual changes;  No vertigo or throat pain   NECK: No pain or stiffness  RESPIRATORY: No cough, wheezing, hemoptysis; No shortness of breath  CARDIOVASCULAR: No chest pain or palpitations  GASTROINTESTINAL: No abdominal or epigastric pain. No nausea, vomiting, or hematemesis; No diarrhea or constipation. No melena or hematochezia.  GENITOURINARY: No dysuria, frequency or hematuria  NEUROLOGICAL: No numbness or weakness  SKIN: No itching, burning, rashes, or lesions   All other review of systems is negative unless indicated above    Vital Signs Last 24 Hrs  T(C): 36.9 (05 Dec 2023 06:16), Max: 36.9 (05 Dec 2023 06:16)  T(F): 98.4 (05 Dec 2023 06:16), Max: 98.4 (05 Dec 2023 06:16)  HR: 69 (05 Dec 2023 06:16) (69 - 69)  BP: 157/88 (05 Dec 2023 06:16) (157/88 - 157/88)  BP(mean): 108 (05 Dec 2023 06:16) (108 - 108)  RR: --  SpO2: 97% (05 Dec 2023 06:16) (97% - 97%)    Parameters below as of 05 Dec 2023 06:16  Patient On (Oxygen Delivery Method): room air        I&O's Summary      PHYSICAL EXAM:    Constitutional: NAD, awake and alert, well-developed  HEENT: PERR, EOMI,  No oral cyananosis.  Neck:  supple,  No JVD  Respiratory: Breath sounds are clear bilaterally, No wheezing, rales or rhonchi  Cardiovascular: S1 and S2, regular rate and rhythm, no Murmurs, gallops or rubs  Gastrointestinal: Bowel Sounds present, soft, nontender.   Extremities: No peripheral edema. No clubbing or cyanosis.  Vascular: 2+ peripheral pulses  Neurological: A/O x 3, no focal deficits  Musculoskeletal: no calf tenderness.  Skin: No rashes.      LABS: All Labs Reviewed:                        15. )-----------( 196      ( 05 Dec 2023 06:36 )             44.4     05 Dec 2023 06:36    140    |  105    |  17     ----------------------------<  133    3.5     |  29     |  1.05     Ca    9.0        05 Dec 2023 06:36  Mg     2.3       05 Dec 2023 06:36    TPro  7.6    /  Alb  3.6    /  TBili  1.2    /  DBili  x      /  AST  19     /  ALT  27     /  AlkPhos  64     05 Dec 2023 06:36    PT/INR - ( 05 Dec 2023 06:36 )   PT: 11.6 sec;   INR: 1.03 ratio         PTT - ( 05 Dec 2023 06:36 )  PTT:36.1 sec  CARDIAC MARKERS ( 05 Dec 2023 06:36 )  x     / x     / 132 U/L / x     / x        - TroponinI hsT: <-12.93    Blood Culture:   < from: Cardiac Catheterization (22 @ 18:58) >  Cardiac Arteries and Lesion Findings    LMCA: Mild diffuse atherosclerosis    LAD: Diffuse mild atherosclerosis  Engaged with JL4 with prolapse of catheter    LCx:     1st Ob Marie% stenosis reduced to 1%.     Devices used     * 6FR JL 4.0 LAUNCHER     * .014 Asahi [id number]     * TELESCOPE     * Euphora SC     Diameter: 2.5 mm. Length: 15 mm. Total duration: 10 sec.1 inflation(s).   to a max pressure of: 12 JACINTO.     * Resolute JOHN JAH     Diameter: 3 mm. Length: 15 mm. Total duration: 9 sec.1 inflation(s). to   a max pressure of: 12 JACINTO.    RCA: Engaged with Ultra however, limited catheter purchase     Mid RCA: 60% stenosis.Pre procedure LISA III flow was noted.     Comments:Locatedin mid portion of the vessel.     Devices used     Dist RCA: 50% stenosis.     Comments:Lesion located prior to the stent, in stent restenosis     Devices used     Coronary tree     Dominance: Right     Impression     Diagnostic Conclusions   Critical OM1 of large territory as culprit lesion of patient's NSTEMI   presentation.     Interventional Conclusions     Successful JAH placement to ostial OM1 lesion resulting in LISA 3 flow   and   no chest pain at the end of the procedure.     Recommendations     Maintain on ticagrelor (180 mg load in lad) with maintenance 90 mg bid,   with cilostazol 100 mg bid which patient was on in the past. Patient with   aspirin allergy with failed desensitisation in the past. Recommend   aggressive medical management of CAD as per ACC/AHA guidelines, including   lifestyle changes.    Estimated Blood Loss:5ml.    Complications:  No complication.     Signatures    < end of copied text >  < from: TTE Echo Complete w/o Contrast w/ Doppler (22 @ 16:31) >     Mild concentric left ventricular hypertrophy is present.   The left ventricle is normal in size.   There are mild regional wall motion abnormalities:   mid anterolateral & inferolateral segments are hypokinetic.   Overall systolic function is preserved. EF=55-60 %.   The left atrium is mildly dilated.   Mild (1+) aortic regurgitation.   Mild (1+) mitral regurgitation.   Mitral valve repair is noted with normal mitral valve function.   Mild (1+) tricuspid valve regurgitation. Normal PA systolic pressures.   Mild pulmonic valvular regurgitation.   Aortic root & ascending aorta are mildly dilated.      < end of copied text >        RADIOLOGY/EKG:    sinus rhythm LBBB  ( new changes compared to previous ekg )

## 2023-12-05 NOTE — ED PROVIDER NOTE - MUSCULOSKELETAL, MLM
Spine appears normal, range of motion is not limited, no muscle or joint tenderness Spine appears normal. Neck nontender supple without pain. Back, chest wall, pelvis nontender stable. Left shoulder no gross deformity. no discoloration. LUE distal motor sensory exam normal. Spine appears normal. Neck: nontender, supple without pain. Back, chest wall, pelvis nontender & stable. Left shoulder no gross deformity, no discoloration, good AROM with + pain. LUE distal motor/sensory exam normal.

## 2023-12-05 NOTE — CONSULT NOTE ADULT - ASSESSMENT
74 y/o male with unwitnessed traumatic synccpe (left shoulder pain) after having 3 cocktails .  Past Hx of CAD (PCI last event was 2020-JAH x 2 to RCA ) NSTEMI 2022,  on Coreg 12.5 mgs daily, who has a UTI and chronic hypokalemia with newly found LBBB. Troponin level was negative .  He follows with Dr. Welch for general cardiology.  Significant Past Med Hx:  MVP, HTN, CVA and CAD      Admit to Telemetry  Recommend potassium level > 4 and Mg > 2  Consider EP Study to further evaluate  Hydrate pt  74 y/o male with unwitnessed traumatic syncope (left shoulder pain) after having 3 cocktails .  Past Hx of CAD (PCI last event was 2020-JAH x 2 to RCA ) NSTEMI 2022,  on Coreg 12.5 mgs daily, who has a UTI and chronic hypokalemia with newly found LBBB. Troponin level was negative .  He follows with Dr. Welch for general cardiology.  Significant Past Med Hx:  MVP, HTN, CVA and CAD      Admit to Telemetry  Recommend potassium level > 4 and Mg > 2  MCOT on discharge with F/ U with Dr. Mila Mason pt   Treat UTI  Replace potassium  echo 74 y/o male with unwitnessed traumatic syncope (left shoulder pain) after having 3 cocktails .  Past Hx of CAD (PCI last event was 2020-JAH x 2 to RCA ) NSTEMI 2022,  on Coreg 12.5 mgs daily, who has a UTI and chronic hypokalemia with newly found LBBB. Troponin level was negative .  He follows with Dr. Welch for general cardiology.  Significant Past Med Hx:  MVP, HTN, CVA and CAD      Admit to Telemetry  Recommend potassium level > 4 and Mg > 2  MCOT on discharge with F/ U with Dr. Boland  Hydrate pt   Treat UTI  Replace potassium  echo      Addendum  12/5/2023 at 5:56 PM  Pt stated that he miscommunicated with me and that he had near syncope that he did fall the fall  but  didn't pass out  72 y/o male with unwitnessed traumatic syncope (left shoulder pain) after having 3 cocktails .  Past Hx of CAD (PCI last event was 2020-JAH x 2 to RCA ) NSTEMI 2022,  on Coreg 12.5 mgs daily, who has a UTI and chronic hypokalemia with newly found LBBB. Troponin level was negative .  He follows with Dr. Welch for general cardiology.  Significant Past Med Hx:  MVP, HTN, CVA and CAD      Admit to Telemetry  Recommend potassium level > 4 and Mg > 2  MCOT on discharge with F/ U with Dr. Boland  Hydrate pt   Treat UTI  Replace potassium  echo      Addendum  12/5/2023 at 5:56 PM  Pt stated that he miscommunicated with me and that he had near syncope that he did fall the fall  but  didn't pass out

## 2023-12-05 NOTE — ED PROVIDER NOTE - ENMT, MLM
Airway patent, Nasal mucosa clear. Mouth with normal mucosa. Throat has no vesicles, no oropharyngeal exudates and uvula is midline. Airway patent, Nasal mucosa clear. Mouth with normal mucosa. Throat has no vesicles, no oropharyngeal exudates and uvula is midline. Battles negative. NC/AT.  Airway patent, Nasal mucosa clear. Mouth with normal mucosa. Throat has no vesicles, no oropharyngeal exudates and uvula is midline. Trejo's negative.

## 2023-12-05 NOTE — ED ADULT NURSE NOTE - OBJECTIVE STATEMENT
74 y/o male presents to ED c/o syncope. At 2100 last night, pt passed out when going to the refrigerator. Pt is unsure if he hit his head, +LOC. Pt takes plavix. Pt denies blurry vision, dizziness, chest pain, SOB, N/V/D, balance disturbances. PMHx 2 stents placed, mitral valve repair.

## 2023-12-05 NOTE — ED ADULT NURSE REASSESSMENT NOTE - NS ED NURSE REASSESS COMMENT FT1
Report received from night SUDEEP Bailon, Pt is resting on stretcher in the hallway. Pt c/o pain to the L shoulder, NAD. Safety and comfort measures maintained.

## 2023-12-05 NOTE — ED PROVIDER NOTE - NEUROLOGICAL, MLM
Alert and oriented, no focal deficits, no motor or sensory deficits. Alert and oriented, no focal deficits, no motor or sensory deficits.  CN 2 - 12 intact, normal speech.

## 2023-12-05 NOTE — ED PROVIDER NOTE - SKIN, MLM
Skin normal color for race, warm, dry and intact. No evidence of rash. Skin normal color for race, warm, dry and intact. No evidence of rash.  No external signs of trauma.  No tactile warmth.

## 2023-12-05 NOTE — ED PROVIDER NOTE - PROGRESS NOTE DETAILS
Abhishek Iqbal for attending Dr. Schwartz: Dr. Palla covering for Dr. Ortiz aware of ED consult. Requests repeat EKG. Abhishek Iqbal for attending Dr. Schwartz: Pt seen by Dr. Palla. +EtOH prior to syncopal episode. Dr. Palla agreeable with d/c home with repeat EKG and troponin negative. Requesting Prednisone for cough. CC:  Cardiology consult/Palla appreciated: EKG + LBB new since last EKG 3/2023: advises tele admisison.  Dr. Stroud aware of tele Observation. CC:  Cardiology consult/Palla appreciated: EKG + LBB new since last EKG 3/2023: advises Tele admisison.  Dr. Stroud aware of Tele Observation.

## 2023-12-05 NOTE — CONSULT NOTE ADULT - SUBJECTIVE AND OBJECTIVE BOX
73 year old male with a hx of CAD, CVA and MVP, had 3 cocktails drinks followed by syncope that was unwitnessed.  He states that he never had syncope prior to this event but does have occasional lightheadedness when changing positions from sitting to standing. He complains of new left shoulder pain.  He has been complaint with his medications and takes Coreg 12.5 mgs daily.  Last stents was  years ago at .  EP called to further investigate syncope  and new LBBBB. TNI: negative x 2   PMHx of bladder stones, chronic hypokalemia on potassium supp,  BPH, CAD s/p stents x2 on Plavix and 81mg ASA, hematuria, HTN, HL              PAST MEDICAL/SURGICAL/FAMILY/SOCIAL HISTORY:   Bladder stones   BPH (benign prostatic hyperplasia)   CAD, multiple vessel with 2 stents 2020  Hematuria   HLD (hyperlipidemia)   HTN (hypertension)   Sleep apnea uses cpap  Stroke .     PAST SURGICAL HISTORY:  Elective surgery cardiac stents 2020  H/O mitral valve repair   S/P bilateral hip replacements  & .     FAMILY HISTORY:  FH: CAD (coronary artery disease).    SOCIAL HISTORY: Denies tobacco, etoh abuse or illicit drug use      Social Concerns: None      ALLERGIES AND HOME MEDICATIONS:   Allergies:        Allergies:  	lisinopril: Drug, Confirmed, Patient, Anaphylaxis, Lia Barry  	amlodipine: Drug, Confirmed, Patient, Anaphylaxis  	aspirin: Drug, Suspected, Patient, Angioedema (Mild), Maria Teresa Peña  	Rosuvastatin Calcium: Drug, Confirmed, Patient, Swelling, Lia Barry  	NSAIDs: Drug Category, Confirmed, Patient, Other, angio edema, Lia Barry      MEDICATIONS  (STANDING):  acetaminophen   IVPB .. 1000 milliGRAM(s) IV Intermittent once  allopurinol 100 milliGRAM(s) Oral daily  aspirin enteric coated 81 milliGRAM(s) Oral daily  atorvastatin 80 milliGRAM(s) Oral at bedtime  carvedilol 25 milliGRAM(s) Oral at bedtime  carvedilol 12.5 milliGRAM(s) Oral daily  clopidogrel Tablet 75 milliGRAM(s) Oral daily  dextrose 5%. 1000 milliLiter(s) (50 mL/Hr) IV Continuous <Continuous>  dextrose 5%. 1000 milliLiter(s) (100 mL/Hr) IV Continuous <Continuous>  dextrose 50% Injectable 25 Gram(s) IV Push once  dextrose 50% Injectable 25 Gram(s) IV Push once  dextrose 50% Injectable 12.5 Gram(s) IV Push once  finasteride 5 milliGRAM(s) Oral daily  FLUoxetine 40 milliGRAM(s) Oral daily  folic acid 1 milliGRAM(s) Oral daily  glucagon  Injectable 1 milliGRAM(s) IntraMuscular once  insulin lispro (ADMELOG) corrective regimen sliding scale   SubCutaneous three times a day before meals  potassium chloride    Tablet ER 20 milliEquivalent(s) Oral once  potassium chloride    Tablet ER 20 milliEquivalent(s) Oral daily  sodium chloride 0.9%. 1000 milliLiter(s) (50 mL/Hr) IV Continuous <Continuous>    MEDICATIONS  (PRN):  acetaminophen     Tablet .. 650 milliGRAM(s) Oral once PRN Mild Pain (1 - 3)  albuterol    90 MICROgram(s) HFA Inhaler 2 Puff(s) Inhalation every 6 hours PRN for shortness of breath and/or wheezing  aluminum hydroxide/magnesium hydroxide/simethicone Suspension 30 milliLiter(s) Oral every 4 hours PRN Dyspepsia  dextrose Oral Gel 15 Gram(s) Oral once PRN Blood Glucose LESS THAN 70 milliGRAM(s)/deciliter  melatonin 3 milliGRAM(s) Oral at bedtime PRN Insomnia  ondansetron Injectable 4 milliGRAM(s) IV Push every 8 hours PRN Nausea and/or Vomiting      Allergies    NSAIDs (Other)  amlodipine (Anaphylaxis)  lisinopril (Anaphylaxis)  Rosuvastatin Calcium (Swelling)  aspirin (Angioedema (Mild))    Intolerances                Vital Signs Last 24 Hrs  T(C): 36.7 (05 Dec 2023 13:13), Max: 36.9 (05 Dec 2023 06:16)  T(F): 98.1 (05 Dec 2023 13:13), Max: 98.4 (05 Dec 2023 06:16)  HR: 61 (05 Dec 2023 13:13) (61 - 69)  BP: 149/77 (05 Dec 2023 13:13) (149/77 - 157/88)  BP(mean): 93 (05 Dec 2023 13:13) (93 - 108)  RR: 17 (05 Dec 2023 13:13) (17 - 17)  SpO2: 96% (05 Dec 2023 13:13) (96% - 97%)    Parameters below as of 05 Dec 2023 13:13  Patient On (Oxygen Delivery Method): room air        REVIEW OF SYSTEMS:    CONSTITUTIONAL:  As per HPI.  HEENT:  Eyes:  No diplopia or blurred vision. ENT:  No earache, sore throat or runny nose.  CARDIOVASCULAR:  No pressure, squeezing, strangling, tightness, heaviness or aching about the chest, neck, axilla or epigastrium.  RESPIRATORY:  No cough, shortness of breath, PND or orthopnea.  GASTROINTESTINAL:  No nausea, vomiting or diarrhea.  GENITOURINARY:  No dysuria, frequency or urgency.  MUSCULOSKELETAL:  As per HPI.  SKIN:  No change in skin, hair or nails.  NEUROLOGIC:  No paresthesias, fasciculations, seizures or weakness.  PSYCHIATRIC:  No disorder of thought or mood.  ENDOCRINE:  No heat or cold intolerance, polyuria or polydipsia.  HEMATOLOGICAL:  No easy bruising or bleedings:  .     PHYSICAL EXAMINATION:    GENERAL APPEARANCE:  Pt. is not currently dyspneic, in no distress. Pt. is alert, oriented, and pleasant.  HEENT:  Pupils are normal and react normally. No icterus. Mucous membranes well colored.  NECK:  Supple. No lymphadenopathy. Jugular venous pressure not elevated. Carotids equal.   HEART:   The cardiac impulse has a normal quality. There are no murmurs, rubs or gallops noted  CHEST:  Chest is clear to auscultation. Normal respiratory effort.  ABDOMEN:  Soft and nontender.   EXTREMITIES:  There is no edema.   SKIN:  No rash or significant lesions are noted.    I&O's Summary      LABS:                        15.1   11.23 )-----------( 196      ( 05 Dec 2023 06:36 )             44.4     12-05    140  |  105  |  17  ----------------------------<  133<H>  3.5   |  29  |  1.05    Ca    9.0      05 Dec 2023 06:36  Mg     2.3     12-05    TPro  7.6  /  Alb  3.6  /  TBili  1.2  /  DBili  x   /  AST  19  /  ALT  27  /  AlkPhos  64  12-05    LIVER FUNCTIONS - ( 05 Dec 2023 06:36 )  Alb: 3.6 g/dL / Pro: 7.6 gm/dL / ALK PHOS: 64 U/L / ALT: 27 U/L / AST: 19 U/L / GGT: x           PT/INR - ( 05 Dec 2023 06:36 )   PT: 11.6 sec;   INR: 1.03 ratio         PTT - ( 05 Dec 2023 06:36 )  PTT:36.1 sec  CARDIAC MARKERS ( 05 Dec 2023 06:36 )  x     / x     / 132 U/L / x     / x          Urinalysis Basic - ( 05 Dec 2023 09:58 )    Color: Yellow / Appearance: Clear / S.008 / pH: x  Gluc: x / Ketone: Negative mg/dL  / Bili: Negative / Urobili: 0.2 mg/dL   Blood: x / Protein: Negative mg/dL / Nitrite: Negative   Leuk Esterase: Negative / RBC: x / WBC x   Sq Epi: x / Non Sq Epi: x / Bacteria: x              TELEMETRY:  SR 70 with BBB  EKG: new LBBB , SR at 68 BPM, new compared to ekg in 3/2023    CARDIAC TESTS:    RADIOLOGY & ADDITIONAL STUDIES:    Summary 2/3/2022     Mild concentric left ventricular hypertrophy is present.   The left ventricle is normal in size.   There are mild regional wall motion abnormalities:   mid anterolateral & inferolateral segments are hypokinetic.   Overall systolic function is preserved. EF=55-60 %.   The left atrium is mildly dilated.   Mild (1+) aortic regurgitation.   Mild (1+) mitral regurgitation.   Mitral valve repair is noted with normal mitral valve function.   Mild (1+) tricuspid valve regurgitation. Normal PA systolic pressures.   Mild pulmonic valvular regurgitation.   Aortic root & ascending aorta are mildly dilated.     Signature        Thank-you for letting the EP Service  participate in the care of your patient.

## 2023-12-05 NOTE — PATIENT PROFILE ADULT - HAVE YOU RECENTLY LOST WEIGHT WITHOUT TRYING?
HPI:           Congestion and rhinorrhea for 4 weeks, not improving.  Now has cough, no fever.  No vomiting or diarrhea      Physical Exam:    Visit Vitals  Pulse 116   Wt 11.8 kg   SpO2 98%       General: Well developed, well nourished in no apparent distress    Ears: tympanic membranes clear    Nose: congestion    Mouth/Throat: clear     Neck: no lymphadenopathy    Lungs: left lung is CTA, right side has rhonchi, normal respiratory effort    Cor: regular rate and rhythm, no murmur    Abdomen: soft, nontender, no mass    Skin: clear    Assessment and Plan:    Bronchopneumonia and sinusitis:  Amoxicillin 400/5, 6 ml bid for 10 days                  
No (0)

## 2023-12-05 NOTE — ED ADULT TRIAGE NOTE - CHIEF COMPLAINT QUOTE
pt ambulatory to triage s/p syncopal episode last night at 2100. pt reports he was reaching to get something in his fridge when episode occurs. pt fell backwards. unknown HS. +anticoagulants. +LOC. pt states he did not feel symptomatic prior to episode. pt only endorsing left shoulder pain. denies cp, sob, dizziness, HA. pmhx 2 cardiac stents, mitral valve repair in 1999. pt is a/o4 and well appearing in triage. pt sent for ekg. pt ambulatory to triage s/p syncopal episode last night at 2100. pt reports he was reaching to get something in his fridge when episode occurs. pt fell backwards. unknown HS. +anticoagulants. +LOC. pt states he did not feel symptomatic prior to episode. pt only endorsing left shoulder pain. denies cp, sob, dizziness, HA. pmhx 2 cardiac stents, mitral valve repair in 1999. pt is a/o4 and well appearing in triage. pt sent for ekg. MD Oro out to triage to assess, no neuro alert activated at this time.

## 2023-12-05 NOTE — CONSULT NOTE ADULT - CONSULT REQUESTED DATE/TIME
Spoke with pt to confirm appointment on tomorrow. Appointment confirmed  
05-Dec-2023 13:59
05-Dec-2023 12:17

## 2023-12-05 NOTE — H&P ADULT - NSHPREVIEWOFSYSTEMS_GEN_ALL_CORE
REVIEW OF SYSTEMS:    CONSTITUTIONAL: No weakness, fevers or chills  EYES/ENT: No visual changes;  No vertigo or throat pain   NECK: No pain or stiffness  RESPIRATORY: No cough, wheezing, hemoptysis; No shortness of breath  CARDIOVASCULAR: No chest pain or palpitations. + syncope  GASTROINTESTINAL: No abdominal or epigastric pain. No nausea, vomiting, or hematemesis; No diarrhea or constipation. No melena or hematochezia.  GENITOURINARY: No dysuria, frequency or hematuria  NEUROLOGICAL: No numbness or weakness  SKIN: No itching, burning, rashes, or lesions   All other review of systems is negative unless indicated above

## 2023-12-05 NOTE — ED ADULT NURSE NOTE - CHIEF COMPLAINT QUOTE
pt ambulatory to triage s/p syncopal episode last night at 2100. pt reports he was reaching to get something in his fridge when episode occurs. pt fell backwards. unknown HS. +anticoagulants. +LOC. pt states he did not feel symptomatic prior to episode. pt only endorsing left shoulder pain. denies cp, sob, dizziness, HA. pmhx 2 cardiac stents, mitral valve repair in 1999. pt is a/o4 and well appearing in triage. pt sent for ekg. MD Oro out to triage to assess, no neuro alert activated at this time.

## 2023-12-05 NOTE — ED PROVIDER NOTE - EYES, MLM
Clear bilaterally, pupils equal, round and reactive to light. Clear bilaterally, pupils equal, round and reactive to light. EOMI. Negative raccoons. Clear bilaterally, pupils equal, round and reactive to light. EOMI. Negative raccoon's.

## 2023-12-05 NOTE — H&P ADULT - NSHPPHYSICALEXAM_GEN_ALL_CORE
ICU Vital Signs Last 24 Hrs  T(C): 36.7 (05 Dec 2023 13:13), Max: 36.9 (05 Dec 2023 06:16)  T(F): 98.1 (05 Dec 2023 13:13), Max: 98.4 (05 Dec 2023 06:16)  HR: 61 (05 Dec 2023 13:13) (61 - 69)  BP: 149/77 (05 Dec 2023 13:13) (149/77 - 157/88)  BP(mean): 93 (05 Dec 2023 13:13) (93 - 108)  ABP: --  ABP(mean): --  RR: 17 (05 Dec 2023 13:13) (17 - 17)  SpO2: 96% (05 Dec 2023 13:13) (96% - 97%)    O2 Parameters below as of 05 Dec 2023 13:13  Patient On (Oxygen Delivery Method): room air            PHYSICAL EXAM:    Constitutional: NAD, awake and alert  HEENT: PERR, EOMI, Normal Hearing, MMM  Neck: Soft and supple, No LAD, No JVD  Respiratory: Breath sounds are clear bilaterally, No wheezing, rales or rhonchi  Cardiovascular: S1 and S2, regular rate and rhythm, no Murmurs, gallops or rubs  Gastrointestinal: Bowel Sounds present, soft, nontender, nondistended, no guarding, no rebound  Extremities: No peripheral edema  Vascular: 2+ peripheral pulses  Neurological: A/O x 3, no focal deficits  Musculoskeletal: 5/5 strength b/l upper and lower extremities  Skin: No rashes

## 2023-12-05 NOTE — H&P ADULT - HISTORY OF PRESENT ILLNESS
74 y/o male with a PMHx of bladder stones, BPH, CAD s/p stents x2 on Plavix and 81mg ASA, hematuria, HTN, HLD, mitral valve repair, sleep apnea, stroke presents to the ED s/p syncope. Pt had 3 cocktails yesterday. Pt reports around 9pm last night he opened the fridge and had a syncopal episode. Duration of LOC unknown. Pt c/o left shoulder pain and neck pain. Denies HA, n/v, CP. Pt with URI with dry cough x3 weeks. Pt reports over the last few weeks he has had episodes of lightheadedness when he gets up quickly. No other complaints at this time. 72 y/o male with a PMHx of bladder stones, BPH, CAD s/p stents x2 on Plavix and 81mg ASA, hematuria, HTN, HLD, mitral valve repair, sleep apnea, stroke presents to the ED s/p syncope. Pt had 3 cocktails yesterday. Pt reports around 9pm last night he opened the fridge and had a syncopal episode. Duration of LOC unknown. Pt c/o left shoulder pain and neck pain. Denies HA, n/v, CP. Pt with URI with dry cough x3 weeks. Pt reports over the last few weeks he has had episodes of lightheadedness when he gets up quickly. No other complaints at this time. 72 y/o male with a PMHx of bladder stones, BPH, CAD s/p stents x2 on Plavix and 81mg ASA, hematuria, HTN, HLD, mitral valve repair, sleep apnea, stroke presents to the ED s/p syncope. Pt had 3 cocktails yesterday. Pt reports around 9pm last night he opened the fridge and had a syncopal episode. Duration of LOC unknown. Pt c/o left shoulder pain and neck pain. Denies HA, n/v, CP. Pt with URI with dry cough x3 weeks. Pt reports over the last few weeks he has had episodes of lightheadedness when he gets up quickly. No other complaints at this time.        CTH/ C spine: negative    EKG: LBBB, new compared to ekg in 3/2023  TNI: negative x 2  Hemodynamically stable      PAST MEDICAL/SURGICAL/FAMILY/SOCIAL HISTORY:    Past Medical, Past Surgical, and Family History:  PAST MEDICAL HISTORY:  Bladder stones     BPH (benign prostatic hyperplasia)     CAD, multiple vessel with 2 stents 5/2020    Hematuria     HLD (hyperlipidemia)     HTN (hypertension)     Sleep apnea uses cpap    Stroke 2001.     PAST SURGICAL HISTORY:  Elective surgery cardiac stents 5/2020    H/O mitral valve repair 1998    S/P bilateral hip replacements 2005 & 2010.     FAMILY HISTORY:  FH: CAD (coronary artery disease).    · Social Concerns: None    ALLERGIES AND HOME MEDICATIONS:   Allergies:        Allergies:  	lisinopril: Drug, Confirmed, Patient, Anaphylaxis, Lia Barry  	amlodipine: Drug, Confirmed, Patient, Anaphylaxis  	aspirin: Drug, Suspected, Patient, Angioedema (Mild), Maria Teresa Peña  	Rosuvastatin Calcium: Drug, Confirmed, Patient, Swelling, Lia Barry  	NSAIDs: Drug Category, Confirmed, Patient, Other, angio edema, Lia Barry     74 y/o male with a PMHx of bladder stones, BPH, CAD s/p stents x2 on Plavix and 81mg ASA, hematuria, HTN, HLD, mitral valve repair, sleep apnea, stroke presents to the ED s/p syncope. Pt had 3 cocktails yesterday. Pt reports around 9pm last night he opened the fridge and had a syncopal episode. Duration of LOC unknown. Pt c/o left shoulder pain and neck pain. Denies HA, n/v, CP. Pt with URI with dry cough x3 weeks. Pt reports over the last few weeks he has had episodes of lightheadedness when he gets up quickly. No other complaints at this time.        CTH/ C spine: negative    EKG: LBBB, new compared to ekg in 3/2023  TNI: negative x 2  Hemodynamically stable      PAST MEDICAL/SURGICAL/FAMILY/SOCIAL HISTORY:    Past Medical, Past Surgical, and Family History:  PAST MEDICAL HISTORY:  Bladder stones     BPH (benign prostatic hyperplasia)     CAD, multiple vessel with 2 stents 5/2020    Hematuria     HLD (hyperlipidemia)     HTN (hypertension)     Sleep apnea uses cpap    Stroke 2001.     PAST SURGICAL HISTORY:  Elective surgery cardiac stents 5/2020    H/O mitral valve repair 1998    S/P bilateral hip replacements 2005 & 2010.     FAMILY HISTORY:  FH: CAD (coronary artery disease).    · Social Concerns: None    ALLERGIES AND HOME MEDICATIONS:   Allergies:        Allergies:  	lisinopril: Drug, Confirmed, Patient, Anaphylaxis, Lia Barry  	amlodipine: Drug, Confirmed, Patient, Anaphylaxis  	aspirin: Drug, Suspected, Patient, Angioedema (Mild), Maria Teresa Peña  	Rosuvastatin Calcium: Drug, Confirmed, Patient, Swelling, Lia Barry  	NSAIDs: Drug Category, Confirmed, Patient, Other, angio edema, Lia Barry     "74 y/o male with a PMHx of bladder stones, chronic hypokalemia on potassium supp,  BPH, CAD s/p stents x2 on Plavix and 81mg ASA, hematuria, HTN, HLD, mitral valve repair, sleep apnea, stroke presents to the ED s/p syncope. Pt had 3 cocktails yesterday. Pt reports around 9pm last night he opened the fridge and had a syncopal episode. Duration of LOC unknown. Pt c/o left shoulder pain and neck pain. Denies HA, n/v, CP. Pt with URI with dry cough x3 weeks. Pt reports over the last few weeks he has had episodes of lightheadedness when he gets up quickly. No other complaints at this time."    Last stent 2 years ago. compliant with dapt. Rarely exercises by choice. Endorses feeling light headed when going from sitting to standing.   Did not have any prodrome prior to syncope. No seizure like activity. No h/o syncope  No new meds.  Last stress test 2 years ago  Denies recent travel  No h/o VTE.  No h/o leg swelling  No recent travel or trauma/surgery  No hemoptysis, no hypoxia      EKG: new LBBB        CTH/ C spine: negative    EKG: LBBB, new compared to ekg in 3/2023  TNI: negative x 2  Hemodynamically stable      PAST MEDICAL/SURGICAL/FAMILY/SOCIAL HISTORY:    Past Medical, Past Surgical, and Family History:  PAST MEDICAL HISTORY:  Bladder stones     BPH (benign prostatic hyperplasia)     CAD, multiple vessel with 2 stents 5/2020    Hematuria     HLD (hyperlipidemia)     HTN (hypertension)     Sleep apnea uses cpap    Stroke 2001.     PAST SURGICAL HISTORY:  Elective surgery cardiac stents 5/2020    H/O mitral valve repair 1998    S/P bilateral hip replacements 2005 & 2010.     FAMILY HISTORY:  FH: CAD (coronary artery disease).    · Social Concerns: None    ALLERGIES AND HOME MEDICATIONS:   Allergies:        Allergies:  	lisinopril: Drug, Confirmed, Patient, Anaphylaxis, Lia Barry  	amlodipine: Drug, Confirmed, Patient, Anaphylaxis  	aspirin: Drug, Suspected, Patient, Angioedema (Mild), Maria Teresa Peña  	Rosuvastatin Calcium: Drug, Confirmed, Patient, Swelling, Lia Barry  	NSAIDs: Drug Category, Confirmed, Patient, Other, angio edema, Lia Barry     "72 y/o male with a PMHx of bladder stones, chronic hypokalemia on potassium supp,  BPH, CAD s/p stents x2 on Plavix and 81mg ASA, hematuria, HTN, HLD, mitral valve repair, sleep apnea, stroke presents to the ED s/p syncope. Pt had 3 cocktails yesterday. Pt reports around 9pm last night he opened the fridge and had a syncopal episode. Duration of LOC unknown. Pt c/o left shoulder pain and neck pain. Denies HA, n/v, CP. Pt with URI with dry cough x3 weeks. Pt reports over the last few weeks he has had episodes of lightheadedness when he gets up quickly. No other complaints at this time."    Last stent 2 years ago. compliant with dapt. Rarely exercises by choice. Endorses feeling light headed when going from sitting to standing.   Did not have any prodrome prior to syncope. No seizure like activity. No h/o syncope  No new meds.  Last stress test 2 years ago  Denies recent travel  No h/o VTE.  No h/o leg swelling  No recent travel or trauma/surgery  No hemoptysis, no hypoxia      EKG: new LBBB        CTH/ C spine: negative    EKG: LBBB, new compared to ekg in 3/2023  TNI: negative x 2  Hemodynamically stable      PAST MEDICAL/SURGICAL/FAMILY/SOCIAL HISTORY:    Past Medical, Past Surgical, and Family History:  PAST MEDICAL HISTORY:  Bladder stones     BPH (benign prostatic hyperplasia)     CAD, multiple vessel with 2 stents 5/2020    Hematuria     HLD (hyperlipidemia)     HTN (hypertension)     Sleep apnea uses cpap    Stroke 2001.     PAST SURGICAL HISTORY:  Elective surgery cardiac stents 5/2020    H/O mitral valve repair 1998    S/P bilateral hip replacements 2005 & 2010.     FAMILY HISTORY:  FH: CAD (coronary artery disease).    · Social Concerns: None    ALLERGIES AND HOME MEDICATIONS:   Allergies:        Allergies:  	lisinopril: Drug, Confirmed, Patient, Anaphylaxis, Lia Barry  	amlodipine: Drug, Confirmed, Patient, Anaphylaxis  	aspirin: Drug, Suspected, Patient, Angioedema (Mild), Maria Teresa Peña  	Rosuvastatin Calcium: Drug, Confirmed, Patient, Swelling, Lia Barry  	NSAIDs: Drug Category, Confirmed, Patient, Other, angio edema, Lia Barry

## 2023-12-05 NOTE — PHARMACOTHERAPY INTERVENTION NOTE - COMMENTS
Medication history complete, reviewed medication with patient and list provided and confirmed with DrFirst.

## 2023-12-05 NOTE — H&P ADULT - ASSESSMENT
#syncope  - admit to tele  - npo a mn  - plan for stress test in am  - echo  - EP consult  - replete electrolytes  - may need holter on d/c  - orthostatics  - resume BB and dapt, statin  - Chlorthalidone not on formulary  - ASA , statin      #DM  - hold metformin  - iss  - give ivf x 10 hours in the event cath is needed - last metformin taken 12/4        Full code  Time spent: 75 min  D/W cardiology

## 2023-12-05 NOTE — ED PROVIDER NOTE - CLINICAL SUMMARY MEDICAL DECISION MAKING FREE TEXT BOX
72 y/o male hx of CAD s/p stents, HTN, HLD presents to the ED ambulatory from home s/p syncope last night. Pt c/o left shoulder pain with poor AROM due to pain. NVI distally. Plan: CT head/Cspine, XR chest, pelvis, left shoulder, EKG, cardiac labs, fall precautions, cautious IVF, discuss with pt's cardiologist.     11:23: Labs notable for normal troponin/CPK/CMP. WBC 11. UA negative. CT and XR negative. 2nd trop ordered. Paging Dr. Ortiz. 74 y/o male hx of CAD s/p stents, HTN, HLD presents to the ED ambulatory from home s/p syncope last night. Pt c/o left shoulder pain with poor AROM due to pain. NVI distally.   Plan: CT head/C-spine, XR chest, pelvis, left shoulder, EKG, cardiac labs, fall precautions, cautious IVF, discuss with pt's cardiologist.     11:23: Labs notable for normal troponin/CPK/CMP. WBC 11. UA negative. CT and XR negative. 2nd trop ordered. Paging Dr. Ortiz.    CC:  Cardiology consult/Palla appreciated: EKG + LBB new since last EKG 3/2023: advises Tele admission.  Dr. Stroud aware of Tele Observation. 72 y/o male hx of CAD s/p stents, HTN, HLD presents to the ED ambulatory from home s/p syncope last night. Pt c/o left shoulder pain with poor AROM due to pain. NVI distally.   Plan: CT head/C-spine, XR chest, pelvis, left shoulder, EKG, cardiac labs, fall precautions, cautious IVF, discuss with pt's cardiologist.     11:23: Labs notable for normal troponin/CPK/CMP. WBC 11. UA negative. CT and XR negative. 2nd trop ordered. Paging Dr. Ortiz.    CC:  Cardiology consult/Palla appreciated: EKG + LBB new since last EKG 3/2023: advises Tele admission.  Dr. Stroud aware of Tele Observation.

## 2023-12-05 NOTE — H&P ADULT - NSHPLABSRESULTS_GEN_ALL_CORE
CBC:            15.1   11.23 )-----------( 196      ( 12-05-23 @ 06:36 )             44.4         Chem:         ( 12-05-23 @ 06:36 )    140  |  105  |  17  ----------------------------<  133<H>  3.5   |  29  |  1.05        Liver Functions: ( 12-05-23 @ 06:36 )  Alb: 3.6 g/dL / Pro: 7.6 gm/dL / ALK PHOS: 64 U/L / ALT: 27 U/L / AST: 19 U/L / GGT: x              Type & Screen:     < from: CT Head No Cont (12.05.23 @ 08:24) >    BRAIN  IMPRESSION:    1)  multiple old infarcts and extensive chronic ischemic changes with   volume loss.  2)  no intracerebral hemorrhage, contusion, or extracerebral hemorrhagic   collections identified.    CERVICAL IMPRESSION:    Multilevel degenerative changes. No acute fracture identified.    --- End of Report ---    < end of copied text >

## 2023-12-05 NOTE — ED ADULT NURSE NOTE - NSFALLUNIVINTERV_ED_ALL_ED
Bed/Stretcher in lowest position, wheels locked, appropriate side rails in place/Call bell, personal items and telephone in reach/Instruct patient to call for assistance before getting out of bed/chair/stretcher/Non-slip footwear applied when patient is off stretcher/[place] to call system/Physically safe environment - no spills, clutter or unnecessary equipment/Purposeful proactive rounding/Room/bathroom lighting operational, light cord in reach Bed/Stretcher in lowest position, wheels locked, appropriate side rails in place/Call bell, personal items and telephone in reach/Instruct patient to call for assistance before getting out of bed/chair/stretcher/Non-slip footwear applied when patient is off stretcher/[place] to call system/Physically safe environment - no spills, clutter or unnecessary equipment/Purposeful proactive rounding/Room/bathroom lighting operational, light cord in reach

## 2023-12-05 NOTE — CONSULT NOTE ADULT - PROBLEM SELECTOR RECOMMENDATION 9
unclear etiology event occurred last night 9 Pm ,  negative troponin , new LBBB without chest pain   would need tele monitoring , follow up echo , ischemic evaluation , EP evaluation

## 2023-12-06 LAB
A1C WITH ESTIMATED AVERAGE GLUCOSE RESULT: 6.4 % — HIGH (ref 4–5.6)
A1C WITH ESTIMATED AVERAGE GLUCOSE RESULT: 6.4 % — HIGH (ref 4–5.6)
ANION GAP SERPL CALC-SCNC: 6 MMOL/L — SIGNIFICANT CHANGE UP (ref 5–17)
ANION GAP SERPL CALC-SCNC: 6 MMOL/L — SIGNIFICANT CHANGE UP (ref 5–17)
BUN SERPL-MCNC: 15 MG/DL — SIGNIFICANT CHANGE UP (ref 7–23)
BUN SERPL-MCNC: 15 MG/DL — SIGNIFICANT CHANGE UP (ref 7–23)
CALCIUM SERPL-MCNC: 8.8 MG/DL — SIGNIFICANT CHANGE UP (ref 8.5–10.1)
CALCIUM SERPL-MCNC: 8.8 MG/DL — SIGNIFICANT CHANGE UP (ref 8.5–10.1)
CHLORIDE SERPL-SCNC: 108 MMOL/L — SIGNIFICANT CHANGE UP (ref 96–108)
CHLORIDE SERPL-SCNC: 108 MMOL/L — SIGNIFICANT CHANGE UP (ref 96–108)
CHOLEST SERPL-MCNC: 104 MG/DL — SIGNIFICANT CHANGE UP
CHOLEST SERPL-MCNC: 104 MG/DL — SIGNIFICANT CHANGE UP
CO2 SERPL-SCNC: 29 MMOL/L — SIGNIFICANT CHANGE UP (ref 22–31)
CO2 SERPL-SCNC: 29 MMOL/L — SIGNIFICANT CHANGE UP (ref 22–31)
CREAT SERPL-MCNC: 0.87 MG/DL — SIGNIFICANT CHANGE UP (ref 0.5–1.3)
CREAT SERPL-MCNC: 0.87 MG/DL — SIGNIFICANT CHANGE UP (ref 0.5–1.3)
EGFR: 92 ML/MIN/1.73M2 — SIGNIFICANT CHANGE UP
EGFR: 92 ML/MIN/1.73M2 — SIGNIFICANT CHANGE UP
ESTIMATED AVERAGE GLUCOSE: 137 MG/DL — HIGH (ref 68–114)
ESTIMATED AVERAGE GLUCOSE: 137 MG/DL — HIGH (ref 68–114)
GLUCOSE BLDC GLUCOMTR-MCNC: 118 MG/DL — HIGH (ref 70–99)
GLUCOSE BLDC GLUCOMTR-MCNC: 118 MG/DL — HIGH (ref 70–99)
GLUCOSE BLDC GLUCOMTR-MCNC: 121 MG/DL — HIGH (ref 70–99)
GLUCOSE BLDC GLUCOMTR-MCNC: 121 MG/DL — HIGH (ref 70–99)
GLUCOSE BLDC GLUCOMTR-MCNC: 130 MG/DL — HIGH (ref 70–99)
GLUCOSE BLDC GLUCOMTR-MCNC: 130 MG/DL — HIGH (ref 70–99)
GLUCOSE SERPL-MCNC: 119 MG/DL — HIGH (ref 70–99)
GLUCOSE SERPL-MCNC: 119 MG/DL — HIGH (ref 70–99)
HCT VFR BLD CALC: 42.1 % — SIGNIFICANT CHANGE UP (ref 39–50)
HCT VFR BLD CALC: 42.1 % — SIGNIFICANT CHANGE UP (ref 39–50)
HDLC SERPL-MCNC: 47 MG/DL — SIGNIFICANT CHANGE UP
HDLC SERPL-MCNC: 47 MG/DL — SIGNIFICANT CHANGE UP
HGB BLD-MCNC: 14.1 G/DL — SIGNIFICANT CHANGE UP (ref 13–17)
HGB BLD-MCNC: 14.1 G/DL — SIGNIFICANT CHANGE UP (ref 13–17)
LIPID PNL WITH DIRECT LDL SERPL: 39 MG/DL — SIGNIFICANT CHANGE UP
LIPID PNL WITH DIRECT LDL SERPL: 39 MG/DL — SIGNIFICANT CHANGE UP
MCHC RBC-ENTMCNC: 30.9 PG — SIGNIFICANT CHANGE UP (ref 27–34)
MCHC RBC-ENTMCNC: 30.9 PG — SIGNIFICANT CHANGE UP (ref 27–34)
MCHC RBC-ENTMCNC: 33.5 GM/DL — SIGNIFICANT CHANGE UP (ref 32–36)
MCHC RBC-ENTMCNC: 33.5 GM/DL — SIGNIFICANT CHANGE UP (ref 32–36)
MCV RBC AUTO: 92.3 FL — SIGNIFICANT CHANGE UP (ref 80–100)
MCV RBC AUTO: 92.3 FL — SIGNIFICANT CHANGE UP (ref 80–100)
NON HDL CHOLESTEROL: 57 MG/DL — SIGNIFICANT CHANGE UP
NON HDL CHOLESTEROL: 57 MG/DL — SIGNIFICANT CHANGE UP
PLATELET # BLD AUTO: 169 K/UL — SIGNIFICANT CHANGE UP (ref 150–400)
PLATELET # BLD AUTO: 169 K/UL — SIGNIFICANT CHANGE UP (ref 150–400)
POTASSIUM SERPL-MCNC: 3 MMOL/L — LOW (ref 3.5–5.3)
POTASSIUM SERPL-MCNC: 3 MMOL/L — LOW (ref 3.5–5.3)
POTASSIUM SERPL-SCNC: 3 MMOL/L — LOW (ref 3.5–5.3)
POTASSIUM SERPL-SCNC: 3 MMOL/L — LOW (ref 3.5–5.3)
RBC # BLD: 4.56 M/UL — SIGNIFICANT CHANGE UP (ref 4.2–5.8)
RBC # BLD: 4.56 M/UL — SIGNIFICANT CHANGE UP (ref 4.2–5.8)
RBC # FLD: 12.8 % — SIGNIFICANT CHANGE UP (ref 10.3–14.5)
RBC # FLD: 12.8 % — SIGNIFICANT CHANGE UP (ref 10.3–14.5)
SODIUM SERPL-SCNC: 143 MMOL/L — SIGNIFICANT CHANGE UP (ref 135–145)
SODIUM SERPL-SCNC: 143 MMOL/L — SIGNIFICANT CHANGE UP (ref 135–145)
TRIGL SERPL-MCNC: 95 MG/DL — SIGNIFICANT CHANGE UP
TRIGL SERPL-MCNC: 95 MG/DL — SIGNIFICANT CHANGE UP
WBC # BLD: 10.84 K/UL — HIGH (ref 3.8–10.5)
WBC # BLD: 10.84 K/UL — HIGH (ref 3.8–10.5)
WBC # FLD AUTO: 10.84 K/UL — HIGH (ref 3.8–10.5)
WBC # FLD AUTO: 10.84 K/UL — HIGH (ref 3.8–10.5)

## 2023-12-06 PROCEDURE — 93016 CV STRESS TEST SUPVJ ONLY: CPT

## 2023-12-06 PROCEDURE — 78452 HT MUSCLE IMAGE SPECT MULT: CPT | Mod: 26

## 2023-12-06 PROCEDURE — 99232 SBSQ HOSP IP/OBS MODERATE 35: CPT

## 2023-12-06 PROCEDURE — 99233 SBSQ HOSP IP/OBS HIGH 50: CPT | Mod: 25

## 2023-12-06 PROCEDURE — 93018 CV STRESS TEST I&R ONLY: CPT

## 2023-12-06 RX ORDER — FLUTICASONE PROPIONATE 50 MCG
2 SPRAY, SUSPENSION NASAL
Refills: 0 | Status: DISCONTINUED | OUTPATIENT
Start: 2023-12-06 | End: 2023-12-07

## 2023-12-06 RX ORDER — REGADENOSON 0.08 MG/ML
0.4 INJECTION, SOLUTION INTRAVENOUS ONCE
Refills: 0 | Status: COMPLETED | OUTPATIENT
Start: 2023-12-06 | End: 2023-12-06

## 2023-12-06 RX ADMIN — Medication 2 SPRAY(S): at 16:35

## 2023-12-06 RX ADMIN — Medication 600 MILLIGRAM(S): at 16:35

## 2023-12-06 RX ADMIN — Medication 600 MILLIGRAM(S): at 21:47

## 2023-12-06 RX ADMIN — CARVEDILOL PHOSPHATE 25 MILLIGRAM(S): 80 CAPSULE, EXTENDED RELEASE ORAL at 21:47

## 2023-12-06 RX ADMIN — Medication 650 MILLIGRAM(S): at 15:15

## 2023-12-06 RX ADMIN — Medication 2 SPRAY(S): at 21:48

## 2023-12-06 RX ADMIN — Medication 3 MILLIGRAM(S): at 21:47

## 2023-12-06 RX ADMIN — Medication 40 MILLIGRAM(S): at 12:21

## 2023-12-06 RX ADMIN — REGADENOSON 0.4 MILLIGRAM(S): 0.08 INJECTION, SOLUTION INTRAVENOUS at 10:37

## 2023-12-06 RX ADMIN — CLOPIDOGREL BISULFATE 75 MILLIGRAM(S): 75 TABLET, FILM COATED ORAL at 12:18

## 2023-12-06 RX ADMIN — Medication 100 MILLIGRAM(S): at 12:19

## 2023-12-06 RX ADMIN — Medication 1 MILLIGRAM(S): at 12:17

## 2023-12-06 RX ADMIN — ATORVASTATIN CALCIUM 80 MILLIGRAM(S): 80 TABLET, FILM COATED ORAL at 21:47

## 2023-12-06 RX ADMIN — Medication 81 MILLIGRAM(S): at 12:17

## 2023-12-06 RX ADMIN — FINASTERIDE 5 MILLIGRAM(S): 5 TABLET, FILM COATED ORAL at 12:17

## 2023-12-06 RX ADMIN — CARVEDILOL PHOSPHATE 12.5 MILLIGRAM(S): 80 CAPSULE, EXTENDED RELEASE ORAL at 12:17

## 2023-12-06 RX ADMIN — Medication 20 MILLIEQUIVALENT(S): at 12:18
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
## 2023-12-06 NOTE — PROGRESS NOTE ADULT - PROBLEM SELECTOR PLAN 1
pt. had near syncope, denies LOC, TTE with preserved LVEF, moderate AS which is new from TTE from 2/2022, EKG with now with LBBB, negative trops x 2, went for   nuclear stress test to r/o ACS pt. had near syncope, denies LOC, TTE with preserved LVEF, moderate AS which is new from TTE from 2/2022, EKG with new with LBBB, negative trops x 2, went for  nuclear stress test to r/o ACS - if normal can dc home,  EP consult appreciated - [facility] on discharge and followup with Dr. Boland, will followup outpatient with Dr. Christiansen (pt's cardiologist) in 1-2 weeks

## 2023-12-06 NOTE — PROGRESS NOTE ADULT - SUBJECTIVE AND OBJECTIVE BOX
patient seen and examined  for stress test today  Review of Systems:  General:denies fever chills, headache, weakness  HEENT: denies blurry vision,diffculty swallowing, difficulty hearing, tinnitus  Cardiovascular: denies chest pain  ,palpitations  Pulmonary:denies shortness of breath, cough, wheezing, hemoptysis  Gastrointestinal: denies abdominal pain, constipation, diarrhea,nausea , vomiting, hematochezia  : denies hematuria, dysuria, or incontinence  Neurological: denies weakness, numbness , tingling, dizziness, tremors  MSK: denies muscle pain, difficulty ambulating, swelling, back pain  skin: denies skin rash, itching, burning, or  skin lesions  Psychiatrical: denies mood disturbances, anxierty, feeling depressed, depression , or difficulty sleeping    Objective:  Vitals  T(C): 36.5 (12-06-23 @ 08:30), Max: 37.1 (12-05-23 @ 22:08)  HR: 62 (12-06-23 @ 08:30) (61 - 79)  BP: 139/88 (12-06-23 @ 08:30) (137/88 - 159/87)  RR: 18 (12-06-23 @ 08:30) (17 - 18)  SpO2: 95% (12-06-23 @ 08:30) (95% - 98%)    Physical Exam:  General: comfortable, no acute distress  HEENT: Atraumatic, no LAD, trachea midline, PERRLA  Cardiovascular: normal s1s2, no murmurs, gallops or fricition rubs  Pulmonary: clear to ausculation Bilaterally, no wheezing , rhonchi  Gastrointestinal: soft non tender non distended, no masses felt, no organomegally  Muscloskeletal: no lower extremity edema, intact bilateral lower extremity pulses  Neurological: CN II-12 intact. No focal weakness  Psychiatrical: normal mood, cooperative  SKIN: no rash, lesions or ulcers    Labs:                          14.1   10.84 )-----------( 169      ( 06 Dec 2023 07:58 )             42.1     12-06    143  |  108  |  15  ----------------------------<  119<H>  3.0<L>   |  29  |  0.87    Ca    8.8      06 Dec 2023 07:58  Mg     2.3     12-05    TPro  7.6  /  Alb  3.6  /  TBili  1.2  /  DBili  x   /  AST  19  /  ALT  27  /  AlkPhos  64  12-05    LIVER FUNCTIONS - ( 05 Dec 2023 06:36 )  Alb: 3.6 g/dL / Pro: 7.6 gm/dL / ALK PHOS: 64 U/L / ALT: 27 U/L / AST: 19 U/L / GGT: x           PT/INR - ( 05 Dec 2023 06:36 )   PT: 11.6 sec;   INR: 1.03 ratio         PTT - ( 05 Dec 2023 06:36 )  PTT:36.1 sec      Active Medications  MEDICATIONS  (STANDING):  allopurinol 100 milliGRAM(s) Oral daily  aspirin enteric coated 81 milliGRAM(s) Oral daily  atorvastatin 80 milliGRAM(s) Oral at bedtime  carvedilol 12.5 milliGRAM(s) Oral daily  carvedilol 25 milliGRAM(s) Oral at bedtime  clopidogrel Tablet 75 milliGRAM(s) Oral daily  dextrose 5%. 1000 milliLiter(s) (50 mL/Hr) IV Continuous <Continuous>  dextrose 5%. 1000 milliLiter(s) (100 mL/Hr) IV Continuous <Continuous>  dextrose 50% Injectable 25 Gram(s) IV Push once  dextrose 50% Injectable 12.5 Gram(s) IV Push once  dextrose 50% Injectable 25 Gram(s) IV Push once  finasteride 5 milliGRAM(s) Oral daily  FLUoxetine 40 milliGRAM(s) Oral daily  folic acid 1 milliGRAM(s) Oral daily  glucagon  Injectable 1 milliGRAM(s) IntraMuscular once  insulin lispro (ADMELOG) corrective regimen sliding scale   SubCutaneous three times a day before meals  potassium chloride    Tablet ER 20 milliEquivalent(s) Oral daily  regadenoson Injectable 0.4 milliGRAM(s) IV Push once  sodium chloride 0.9%. 1000 milliLiter(s) (50 mL/Hr) IV Continuous <Continuous>    MEDICATIONS  (PRN):  acetaminophen     Tablet .. 650 milliGRAM(s) Oral once PRN Mild Pain (1 - 3)  albuterol    90 MICROgram(s) HFA Inhaler 2 Puff(s) Inhalation every 6 hours PRN for shortness of breath and/or wheezing  aluminum hydroxide/magnesium hydroxide/simethicone Suspension 30 milliLiter(s) Oral every 4 hours PRN Dyspepsia  dextrose Oral Gel 15 Gram(s) Oral once PRN Blood Glucose LESS THAN 70 milliGRAM(s)/deciliter  melatonin 3 milliGRAM(s) Oral at bedtime PRN Insomnia  ondansetron Injectable 4 milliGRAM(s) IV Push every 8 hours PRN Nausea and/or Vomiting

## 2023-12-06 NOTE — PROGRESS NOTE ADULT - SUBJECTIVE AND OBJECTIVE BOX
CHIEF COMPLAINT: passed at home  last night     HPI: 73 year old male with hx of  HTN HLD CAD PCI  May 2020 , 2022 Om1 stent , Prior MV repair  CVA  came to ER with complain that patient passed out at home last night while he was opening refrigerator , sustained  injury to left shoulder , patient says he did have 3 drinks an hour before the event , patient denies any symptoms prior to passing out ,and after waking , other left shoulder injury pain , no chest pain or shortness of breath or dizziness   , no prior syncopal episode , Patient says he was suffering with cough for last 3 weeks , did take antibiotics inhalers without much improvement , patient says he was not coughing prior to the event ,     patient ekg showed LBBB which is new compared to prior , normal troponin     23: went for chemical stress test, tele: NSR 60s     MEDICATIONS:Home Medications:  allopurinol 100 mg oral tablet: 1 tab(s) orally once a day (2022 11:34)  ezetimibe 10 mg oral tablet: 1 tab(s) orally once a day (2022 11:34)  finasteride 5 mg oral tablet: 1 tab(s) orally once a day (2022 11:34)  FLUoxetine 20 mg oral capsule: 1 cap(s) orally once a day (2022 11:34)  oxybutynin 5 mg oral tablet: 2 tab(s) orally once a day (in the morning) (2022 11:34)  rosuvastatin 40 mg oral tablet: 1 tab(s) orally once a day (2022 11:34)    MEDICATIONS  (STANDING):  allopurinol 100 milliGRAM(s) Oral daily  aspirin enteric coated 81 milliGRAM(s) Oral daily  atorvastatin 80 milliGRAM(s) Oral at bedtime  carvedilol 25 milliGRAM(s) Oral at bedtime  carvedilol 12.5 milliGRAM(s) Oral daily  clopidogrel Tablet 75 milliGRAM(s) Oral daily  dextrose 5%. 1000 milliLiter(s) (50 mL/Hr) IV Continuous <Continuous>  dextrose 5%. 1000 milliLiter(s) (100 mL/Hr) IV Continuous <Continuous>  dextrose 50% Injectable 25 Gram(s) IV Push once  dextrose 50% Injectable 25 Gram(s) IV Push once  dextrose 50% Injectable 12.5 Gram(s) IV Push once  finasteride 5 milliGRAM(s) Oral daily  FLUoxetine 40 milliGRAM(s) Oral daily  folic acid 1 milliGRAM(s) Oral daily  glucagon  Injectable 1 milliGRAM(s) IntraMuscular once  insulin lispro (ADMELOG) corrective regimen sliding scale   SubCutaneous three times a day before meals  potassium chloride    Tablet ER 20 milliEquivalent(s) Oral daily  sodium chloride 0.9%. 1000 milliLiter(s) (50 mL/Hr) IV Continuous <Continuous>    Vital Signs Last 24 Hrs  T(C): 36.5 (06 Dec 2023 08:30), Max: 37.1 (05 Dec 2023 22:08)  T(F): 97.7 (06 Dec 2023 08:30), Max: 98.7 (05 Dec 2023 22:08)  HR: 62 (06 Dec 2023 08:30) (61 - 79)  BP: 139/88 (06 Dec 2023 08:30) (137/88 - 159/87)  BP(mean): 93 (05 Dec 2023 13:13) (93 - 93)  RR: 18 (06 Dec 2023 08:30) (17 - 18)  SpO2: 95% (06 Dec 2023 08:30) (95% - 98%)    Parameters below as of 06 Dec 2023 08:30  Patient On (Oxygen Delivery Method): room air      PHYSICAL EXAM:    Constitutional: NAD, awake and alert, well-developed  HEENT: PERR, EOMI,  No oral cyananosis.  Neck:  supple,  No JVD  Respiratory: Breath sounds are clear bilaterally, No wheezing, rales or rhonchi  Cardiovascular: S1 and S2, regular rate and rhythm, no Murmurs, gallops or rubs  Gastrointestinal: Bowel Sounds present, soft, nontender.   Extremities: No peripheral edema. No clubbing or cyanosis.  Vascular: 2+ peripheral pulses  Neurological: A/O x 3, no focal deficits  Musculoskeletal: no calf tenderness.  Skin: No rashes.      LABS: All Labs Reviewed:             CARDIAC MARKERS ( 05 Dec 2023 06:36 )  x     / x     / 132 U/L / x     / x                                 14.1   10.84 )-----------( 169      ( 06 Dec 2023 07:58 )             42.1     12-06    143  |  108  |  15  ----------------------------<  119<H>  3.0<L>   |  29  |  0.87    Ca    8.8      06 Dec 2023 07:58  Mg     2.3         TPro  7.6  /  Alb  3.6  /  TBili  1.2  /  DBili  x   /  AST  19  /  ALT  27  /  AlkPhos  64      - TroponinI hsT: <-9.44, <-12.93    Blood Culture:     Radiology/TTE/Stress test: reviewed    < from: TTE Echo Complete w/o Contrast w/ Doppler (23 @ 15:40) >   Impression     Summary     Estimated left ventricular ejection fraction is 55-60 %.   The left ventricle is normal in size, wall motion and contractility as   seen in limited views.   Mild concentric left ventricular hypertrophy is present.   The left atrium is moderately dilated.   The aortic valve is well visualized, appears moderately calcified. Valve   opening seems to be restricted.   Moderate aortic stenosis is present.   Calculated HAL is 1.4 cm2.   Peak and mean transaortic gradients are 40 and 26 mmHg respectively.   Mild (1+) aortic regurgitation is present.   Mitral valve repair is present and intact. The Mean mitral gradient of7   mmHg appears elevated.   Mild mitral annular calcification is present.   Mild (1+) mitral regurgitation is present.   EA reversal of the mitral inflow consistent with reduced compliance of   the   left ventricle.   Normal appearing tricuspid valve structure.   Mild (1+) tricuspid valve regurgitation is present.   Normal appearing pulmonic valve structure.   Mild pulmonic valvular regurgitation (1+) is present.     Signature     ----------------------------------------------------------------   Electronically signed by Isak Brian MD(Interpreting   physician) on 2023 08:07 PM   ----------------------------------------------------------------    < end of copied text >      -------------------------------------------------------------------------------------------------  < from: Cardiac Catheterization (22 @ 18:58) >  Cardiac Arteries and Lesion Findings    LMCA: Mild diffuse atherosclerosis    LAD: Diffuse mild atherosclerosis  Engaged with JL4 with prolapse of catheter    LCx:     1st Ob Marie% stenosis reduced to 1%.     Devices used     * 6FR JL 4.0 LAUNCHER     * .014 Asahi [id number]     * TELESCOPE     * Euphora SC     Diameter: 2.5 mm. Length: 15 mm. Total duration: 10 sec.1 inflation(s).   to a max pressure of: 12 JACINTO.     * Resolute JOHN JAH     Diameter: 3 mm. Length: 15 mm. Total duration: 9 sec.1 inflation(s). to   a max pressure of: 12 JACINTO.    RCA: Engaged with Ultra however, limited catheter purchase     Mid RCA: 60% stenosis.Pre procedure LISA III flow was noted.     Comments:Locatedin mid portion of the vessel.     Devices used     Dist RCA: 50% stenosis.     Comments:Lesion located prior to the stent, in stent restenosis     Devices used     Coronary tree     Dominance: Right     Impression     Diagnostic Conclusions   Critical OM1 of large territory as culprit lesion of patient's NSTEMI   presentation.     Interventional Conclusions     Successful JAH placement to ostial OM1 lesion resulting in LISA 3 flow   and   no chest pain at the end of the procedure.     Recommendations     Maintain on ticagrelor (180 mg load in lad) with maintenance 90 mg bid,   with cilostazol 100 mg bid which patient was on in the past. Patient with   aspirin allergy with failed desensitisation in the past. Recommend   aggressive medical management of CAD as per ACC/AHA guidelines, including   lifestyle changes.    Estimated Blood Loss:5ml.    Complications:  No complication.     Signatures    < end of copied text >  < from: TTE Echo Complete w/o Contrast w/ Doppler (22 @ 16:31) >     Mild concentric left ventricular hypertrophy is present.   The left ventricle is normal in size.   There are mild regional wall motion abnormalities:   mid anterolateral & inferolateral segments are hypokinetic.   Overall systolic function is preserved. EF=55-60 %.   The left atrium is mildly dilated.   Mild (1+) aortic regurgitation.   Mild (1+) mitral regurgitation.   Mitral valve repair is noted with normal mitral valve function.   Mild (1+) tricuspid valve regurgitation. Normal PA systolic pressures.   Mild pulmonic valvular regurgitation.   Aortic root & ascending aorta are mildly dilated.      < end of copied text >        RADIOLOGY/EKG:    sinus rhythm LBBB  ( new changes compared to previous ekg )      CHIEF COMPLAINT: passed at home  last night     HPI: 73 year old male with hx of  HTN HLD CAD PCI  May 2020 , 2022 Om1 stent , Prior MV repair  CVA  came to ER with complain that patient passed out at home last night while he was opening refrigerator , sustained  injury to left shoulder , patient says he did have 3 drinks an hour before the event , patient denies any symptoms prior to passing out ,and after waking , other left shoulder injury pain , no chest pain or shortness of breath or dizziness   , no prior syncopal episode , Patient says he was suffering with cough for last 3 weeks , did take antibiotics inhalers without much improvement , patient says he was not coughing prior to the event ,     patient ekg showed LBBB which is new compared to prior , normal troponin     23: went for chemical stress test, tele: NSR 60s     MEDICATIONS:Home Medications:  allopurinol 100 mg oral tablet: 1 tab(s) orally once a day (2022 11:34)  ezetimibe 10 mg oral tablet: 1 tab(s) orally once a day (2022 11:34)  finasteride 5 mg oral tablet: 1 tab(s) orally once a day (2022 11:34)  FLUoxetine 20 mg oral capsule: 1 cap(s) orally once a day (2022 11:34)  oxybutynin 5 mg oral tablet: 2 tab(s) orally once a day (in the morning) (2022 11:34)  rosuvastatin 40 mg oral tablet: 1 tab(s) orally once a day (2022 11:34)    MEDICATIONS  (STANDING):  allopurinol 100 milliGRAM(s) Oral daily  aspirin enteric coated 81 milliGRAM(s) Oral daily  atorvastatin 80 milliGRAM(s) Oral at bedtime  carvedilol 25 milliGRAM(s) Oral at bedtime  carvedilol 12.5 milliGRAM(s) Oral daily  clopidogrel Tablet 75 milliGRAM(s) Oral daily  dextrose 5%. 1000 milliLiter(s) (50 mL/Hr) IV Continuous <Continuous>  dextrose 5%. 1000 milliLiter(s) (100 mL/Hr) IV Continuous <Continuous>  dextrose 50% Injectable 25 Gram(s) IV Push once  dextrose 50% Injectable 25 Gram(s) IV Push once  dextrose 50% Injectable 12.5 Gram(s) IV Push once  finasteride 5 milliGRAM(s) Oral daily  FLUoxetine 40 milliGRAM(s) Oral daily  folic acid 1 milliGRAM(s) Oral daily  glucagon  Injectable 1 milliGRAM(s) IntraMuscular once  insulin lispro (ADMELOG) corrective regimen sliding scale   SubCutaneous three times a day before meals  potassium chloride    Tablet ER 20 milliEquivalent(s) Oral daily  sodium chloride 0.9%. 1000 milliLiter(s) (50 mL/Hr) IV Continuous <Continuous>    Vital Signs Last 24 Hrs  T(C): 36.5 (06 Dec 2023 08:30), Max: 37.1 (05 Dec 2023 22:08)  T(F): 97.7 (06 Dec 2023 08:30), Max: 98.7 (05 Dec 2023 22:08)  HR: 62 (06 Dec 2023 08:30) (61 - 79)  BP: 139/88 (06 Dec 2023 08:30) (137/88 - 159/87)  BP(mean): 93 (05 Dec 2023 13:13) (93 - 93)  RR: 18 (06 Dec 2023 08:30) (17 - 18)  SpO2: 95% (06 Dec 2023 08:30) (95% - 98%)    Parameters below as of 06 Dec 2023 08:30  Patient On (Oxygen Delivery Method): room air      PHYSICAL EXAM:    Constitutional: NAD, awake and alert, well-developed  HEENT: PERR, EOMI,  No oral cyananosis.  Neck:  supple,  No JVD  Respiratory: Breath sounds are clear bilaterally, No wheezing, rales or rhonchi  Cardiovascular: S1 and S2, regular rate and rhythm, no Murmurs, gallops or rubs  Gastrointestinal: Bowel Sounds present, soft, nontender.   Extremities: No peripheral edema. No clubbing or cyanosis.  Vascular: 2+ peripheral pulses  Neurological: A/O x 3, no focal deficits  Musculoskeletal: no calf tenderness.  Skin: No rashes.      LABS: All Labs Reviewed:             CARDIAC MARKERS ( 05 Dec 2023 06:36 )  x     / x     / 132 U/L / x     / x                                 14.1   10.84 )-----------( 169      ( 06 Dec 2023 07:58 )             42.1     12-06    143  |  108  |  15  ----------------------------<  119<H>  3.0<L>   |  29  |  0.87    Ca    8.8      06 Dec 2023 07:58  Mg     2.3         TPro  7.6  /  Alb  3.6  /  TBili  1.2  /  DBili  x   /  AST  19  /  ALT  27  /  AlkPhos  64      - TroponinI hsT: <-9.44, <-12.93    Blood Culture:     Radiology/TTE/Stress test: reviewed    < from: TTE Echo Complete w/o Contrast w/ Doppler (23 @ 15:40) >   Impression     Summary     Estimated left ventricular ejection fraction is 55-60 %.   The left ventricle is normal in size, wall motion and contractility as   seen in limited views.   Mild concentric left ventricular hypertrophy is present.   The left atrium is moderately dilated.   The aortic valve is well visualized, appears moderately calcified. Valve   opening seems to be restricted.   Moderate aortic stenosis is present.   Calculated HAL is 1.4 cm2.   Peak and mean transaortic gradients are 40 and 26 mmHg respectively.   Mild (1+) aortic regurgitation is present.   Mitral valve repair is present and intact. The Mean mitral gradient of7   mmHg appears elevated.   Mild mitral annular calcification is present.   Mild (1+) mitral regurgitation is present.   EA reversal of the mitral inflow consistent with reduced compliance of   the   left ventricle.   Normal appearing tricuspid valve structure.   Mild (1+) tricuspid valve regurgitation is present.   Normal appearing pulmonic valve structure.   Mild pulmonic valvular regurgitation (1+) is present.     Signature     ----------------------------------------------------------------   Electronically signed by Isak Brian MD(Interpreting   physician) on 2023 08:07 PM   ----------------------------------------------------------------    < end of copied text >      -------------------------------------------------------------------------------------------------  < from: Cardiac Catheterization (22 @ 18:58) >  Cardiac Arteries and Lesion Findings    LMCA: Mild diffuse atherosclerosis    LAD: Diffuse mild atherosclerosis  Engaged with JL4 with prolapse of catheter    LCx:     1st Ob Marie% stenosis reduced to 1%.     Devices used     * 6FR JL 4.0 LAUNCHER     * .014 Asahi [id number]     * TELESCOPE     * Euphora SC     Diameter: 2.5 mm. Length: 15 mm. Total duration: 10 sec.1 inflation(s).   to a max pressure of: 12 JACINTO.     * Resolute JOHN JAH     Diameter: 3 mm. Length: 15 mm. Total duration: 9 sec.1 inflation(s). to   a max pressure of: 12 JACINTO.    RCA: Engaged with Ultra however, limited catheter purchase     Mid RCA: 60% stenosis.Pre procedure LISA III flow was noted.     Comments:Locatedin mid portion of the vessel.     Devices used     Dist RCA: 50% stenosis.     Comments:Lesion located prior to the stent, in stent restenosis     Devices used     Coronary tree     Dominance: Right     Impression     Diagnostic Conclusions   Critical OM1 of large territory as culprit lesion of patient's NSTEMI   presentation.     Interventional Conclusions     Successful JAH placement to ostial OM1 lesion resulting in LISA 3 flow   and   no chest pain at the end of the procedure.     Recommendations     Maintain on ticagrelor (180 mg load in lad) with maintenance 90 mg bid,   with cilostazol 100 mg bid which patient was on in the past. Patient with   aspirin allergy with failed desensitisation in the past. Recommend   aggressive medical management of CAD as per ACC/AHA guidelines, including   lifestyle changes.    Estimated Blood Loss:5ml.    Complications:  No complication.     Signatures    < end of copied text >  < from: TTE Echo Complete w/o Contrast w/ Doppler (22 @ 16:31) >     Mild concentric left ventricular hypertrophy is present.   The left ventricle is normal in size.   There are mild regional wall motion abnormalities:   mid anterolateral & inferolateral segments are hypokinetic.   Overall systolic function is preserved. EF=55-60 %.   The left atrium is mildly dilated.   Mild (1+) aortic regurgitation.   Mild (1+) mitral regurgitation.   Mitral valve repair is noted with normal mitral valve function.   Mild (1+) tricuspid valve regurgitation. Normal PA systolic pressures.   Mild pulmonic valvular regurgitation.   Aortic root & ascending aorta are mildly dilated.      < end of copied text >        RADIOLOGY/EKG:    sinus rhythm LBBB  ( new changes compared to previous ekg )

## 2023-12-06 NOTE — CHART NOTE - NSCHARTNOTEFT_GEN_A_CORE
STRESS TEST REPORT    KASSY KRAMER 73yM  MRN: [id number]  : 51  Admit: 23    REGADENASON    Regadenoson was 5 cc (0.4 mg Regadenoson) which was given rapidly over 10 seconds  into a peripheral IV.  Immediately after Regadenoson injection, flush w/ 5 cc saline given.  Radiopharmaceutical was injected 10-20 sec after the saline flush.  Patient was monitored for 6 minutes.  A 12 lead ECG was recorded every minute & BP was recorded throughout the test.      Resting ECG: NSR with First degree AV block ,TWI  Peak infusion ECG: No significant EKG changes noted from Baseline   Arrhythmias: Occasional PVCs   Chest pain: None     CONCLUSION:  No significant EKG changes noted from Baseline   See myocardial perfusion scan report. STRESS TEST REPORT    KASSY KRAMER 73yM  MRN: [id number]  : 51  Admit: 23    REGADENASON    Regadenoson was 5 cc (0.4 mg Regadenoson) which was given rapidly over 10 seconds  into a peripheral IV.  Immediately after Regadenoson injection, flush w/ 5 cc saline given.  Radiopharmaceutical was injected 10-20 sec after the saline flush.  Patient was monitored for 6 minutes.  A 12 lead ECG was recorded every minute & BP was recorded throughout the test.      Resting ECG: NSR with First degree AV block ,TWI  Peak infusion ECG: No significant EKG changes noted from Baseline   Arrhythmias: Occasional PVCs   Chest pain: None     CONCLUSION:  No significant EKG changes noted from Baseline   See myocardial perfusion scan report.

## 2023-12-07 ENCOUNTER — TRANSCRIPTION ENCOUNTER (OUTPATIENT)
Age: 73
End: 2023-12-07

## 2023-12-07 VITALS
DIASTOLIC BLOOD PRESSURE: 79 MMHG | SYSTOLIC BLOOD PRESSURE: 136 MMHG | RESPIRATION RATE: 18 BRPM | OXYGEN SATURATION: 98 % | TEMPERATURE: 98 F | HEART RATE: 66 BPM

## 2023-12-07 LAB
ANION GAP SERPL CALC-SCNC: 6 MMOL/L — SIGNIFICANT CHANGE UP (ref 5–17)
ANION GAP SERPL CALC-SCNC: 6 MMOL/L — SIGNIFICANT CHANGE UP (ref 5–17)
BUN SERPL-MCNC: 21 MG/DL — SIGNIFICANT CHANGE UP (ref 7–23)
BUN SERPL-MCNC: 21 MG/DL — SIGNIFICANT CHANGE UP (ref 7–23)
CALCIUM SERPL-MCNC: 8.9 MG/DL — SIGNIFICANT CHANGE UP (ref 8.5–10.1)
CALCIUM SERPL-MCNC: 8.9 MG/DL — SIGNIFICANT CHANGE UP (ref 8.5–10.1)
CHLORIDE SERPL-SCNC: 105 MMOL/L — SIGNIFICANT CHANGE UP (ref 96–108)
CHLORIDE SERPL-SCNC: 105 MMOL/L — SIGNIFICANT CHANGE UP (ref 96–108)
CO2 SERPL-SCNC: 29 MMOL/L — SIGNIFICANT CHANGE UP (ref 22–31)
CO2 SERPL-SCNC: 29 MMOL/L — SIGNIFICANT CHANGE UP (ref 22–31)
CREAT SERPL-MCNC: 1.09 MG/DL — SIGNIFICANT CHANGE UP (ref 0.5–1.3)
CREAT SERPL-MCNC: 1.09 MG/DL — SIGNIFICANT CHANGE UP (ref 0.5–1.3)
EGFR: 72 ML/MIN/1.73M2 — SIGNIFICANT CHANGE UP
EGFR: 72 ML/MIN/1.73M2 — SIGNIFICANT CHANGE UP
GLUCOSE BLDC GLUCOMTR-MCNC: 136 MG/DL — HIGH (ref 70–99)
GLUCOSE BLDC GLUCOMTR-MCNC: 136 MG/DL — HIGH (ref 70–99)
GLUCOSE BLDC GLUCOMTR-MCNC: 85 MG/DL — SIGNIFICANT CHANGE UP (ref 70–99)
GLUCOSE BLDC GLUCOMTR-MCNC: 85 MG/DL — SIGNIFICANT CHANGE UP (ref 70–99)
GLUCOSE SERPL-MCNC: 120 MG/DL — HIGH (ref 70–99)
GLUCOSE SERPL-MCNC: 120 MG/DL — HIGH (ref 70–99)
POTASSIUM SERPL-MCNC: 3.4 MMOL/L — LOW (ref 3.5–5.3)
POTASSIUM SERPL-MCNC: 3.4 MMOL/L — LOW (ref 3.5–5.3)
POTASSIUM SERPL-SCNC: 3.4 MMOL/L — LOW (ref 3.5–5.3)
POTASSIUM SERPL-SCNC: 3.4 MMOL/L — LOW (ref 3.5–5.3)
SODIUM SERPL-SCNC: 140 MMOL/L — SIGNIFICANT CHANGE UP (ref 135–145)
SODIUM SERPL-SCNC: 140 MMOL/L — SIGNIFICANT CHANGE UP (ref 135–145)

## 2023-12-07 PROCEDURE — 99239 HOSP IP/OBS DSCHRG MGMT >30: CPT

## 2023-12-07 PROCEDURE — 99232 SBSQ HOSP IP/OBS MODERATE 35: CPT

## 2023-12-07 RX ORDER — BUDESONIDE AND FORMOTEROL FUMARATE DIHYDRATE 160; 4.5 UG/1; UG/1
2 AEROSOL RESPIRATORY (INHALATION)
Qty: 1 | Refills: 0
Start: 2023-12-07 | End: 2024-01-05

## 2023-12-07 RX ORDER — CEFPODOXIME PROXETIL 100 MG
1 TABLET ORAL
Qty: 10 | Refills: 0
Start: 2023-12-07 | End: 2023-12-11

## 2023-12-07 RX ORDER — FLUTICASONE PROPIONATE 50 MCG
2 SPRAY, SUSPENSION NASAL
Qty: 1 | Refills: 0
Start: 2023-12-07 | End: 2024-01-03

## 2023-12-07 RX ORDER — SENNA PLUS 8.6 MG/1
1 TABLET ORAL
Qty: 30 | Refills: 0
Start: 2023-12-07 | End: 2024-01-05

## 2023-12-07 RX ORDER — DOCUSATE SODIUM 100 MG
1 CAPSULE ORAL
Qty: 90 | Refills: 0
Start: 2023-12-07 | End: 2024-01-05

## 2023-12-07 RX ADMIN — Medication 81 MILLIGRAM(S): at 09:10

## 2023-12-07 RX ADMIN — Medication 600 MILLIGRAM(S): at 09:10

## 2023-12-07 RX ADMIN — Medication 40 MILLIGRAM(S): at 09:12

## 2023-12-07 RX ADMIN — Medication 2 SPRAY(S): at 09:11

## 2023-12-07 RX ADMIN — Medication 100 MILLIGRAM(S): at 09:14

## 2023-12-07 RX ADMIN — Medication 1 MILLIGRAM(S): at 09:11

## 2023-12-07 RX ADMIN — CARVEDILOL PHOSPHATE 12.5 MILLIGRAM(S): 80 CAPSULE, EXTENDED RELEASE ORAL at 09:10

## 2023-12-07 RX ADMIN — FINASTERIDE 5 MILLIGRAM(S): 5 TABLET, FILM COATED ORAL at 09:11

## 2023-12-07 RX ADMIN — Medication 20 MILLIEQUIVALENT(S): at 09:10

## 2023-12-07 RX ADMIN — CLOPIDOGREL BISULFATE 75 MILLIGRAM(S): 75 TABLET, FILM COATED ORAL at 09:11

## 2023-12-07 NOTE — DISCHARGE NOTE PROVIDER - NSDCCPCAREPLAN_GEN_ALL_CORE_FT
PRINCIPAL DISCHARGE DIAGNOSIS  Diagnosis: Syncope  Assessment and Plan of Treatment: Syncope  ++new LBBB likely ischemic in etiology  Cardiology and EP consulted  your stress test negative  your echo reviewed   Please resume your home medications on discharge  you will have an outpatient heart monitor placed on discharge  Postnasal drip /bronchitis  Please flonase inhalers, the short course of steroids, antibiotics on discharge  Constipation  Please start senna andcolace on discharge        SECONDARY DISCHARGE DIAGNOSES  Diagnosis: LBBB (left bundle branch block)  Assessment and Plan of Treatment:

## 2023-12-07 NOTE — PROGRESS NOTE ADULT - PROBLEM SELECTOR PLAN 4
·  Problem: Hyperlipidemia.   ·  Recommendation: continue statin.
·  Problem: Hyperlipidemia.   ·  Recommendation: continue statin.

## 2023-12-07 NOTE — DISCHARGE NOTE PROVIDER - DISCHARGE DATE
Patient is a 69y old  Female who presents with a chief complaint of shortness of breath (08 Aug 2023 10:06)    Patient seen this morning. She continues to have pain to right rib area. States that breathing is overall comfortable, however when she gets the rib pain, it affects her breathing. No chest pain.     MEDICATIONS  (STANDING):  dextrose 5%. 1000 milliLiter(s) (50 mL/Hr) IV Continuous <Continuous>  dextrose 5%. 1000 milliLiter(s) (100 mL/Hr) IV Continuous <Continuous>  dextrose 50% Injectable 25 Gram(s) IV Push once  dextrose 50% Injectable 12.5 Gram(s) IV Push once  dextrose 50% Injectable 25 Gram(s) IV Push once  glucagon  Injectable 1 milliGRAM(s) IntraMuscular once  heparin   Injectable 5000 Unit(s) SubCutaneous every 12 hours  insulin glargine Injectable (LANTUS) 8 Unit(s) SubCutaneous at bedtime  insulin lispro (ADMELOG) corrective regimen sliding scale   SubCutaneous at bedtime  insulin lispro (ADMELOG) corrective regimen sliding scale   SubCutaneous three times a day before meals  polyethylene glycol 3350 17 Gram(s) Oral daily  senna 2 Tablet(s) Oral at bedtime    MEDICATIONS  (PRN):  acetaminophen     Tablet .. 650 milliGRAM(s) Oral every 6 hours PRN Temp greater or equal to 38C (100.4F), Mild Pain (1 - 3)  dextrose Oral Gel 15 Gram(s) Oral once PRN Blood Glucose LESS THAN 70 milliGRAM(s)/deciliter  morphine  - Injectable 2 milliGRAM(s) IV Push every 6 hours PRN Severe Pain (7 - 10)  oxyCODONE    IR 5 milliGRAM(s) Oral every 6 hours PRN Moderate Pain (4 - 6)        Vital Signs Last 24 Hrs  T(C): 37.1 (08 Aug 2023 13:29), Max: 37.1 (08 Aug 2023 13:29)  T(F): 98.7 (08 Aug 2023 13:29), Max: 98.7 (08 Aug 2023 13:29)  HR: 107 (08 Aug 2023 13:29) (97 - 112)  BP: 106/66 (08 Aug 2023 13:29) (105/59 - 142/62)  BP(mean): --  RR: 20 (08 Aug 2023 13:29) (16 - 20)  SpO2: 100% (08 Aug 2023 13:29) (96% - 100%)    Parameters below as of 08 Aug 2023 13:29  Patient On (Oxygen Delivery Method): nasal cannula  O2 Flow (L/min): 2      PE  NAD  Awake, alert  Anicteric, MMM  Abd soft, NT, ND  +TTP right posterior ribs  No c/c/e  No rash grossly  FROM                          12.2   15.06 )-----------( 665      ( 08 Aug 2023 05:15 )             37.7       08-08    131<L>  |  94<L>  |  11  ----------------------------<  204<H>  4.5   |  25  |  0.50    Ca    10.0      08 Aug 2023 05:15  Phos  2.7     08-08  Mg     2.00     08-08    TPro  8.3  /  Alb  3.7  /  TBili  0.4  /  DBili  x   /  AST  26  /  ALT  14  /  AlkPhos  79  08-08       07-Dec-2023

## 2023-12-07 NOTE — DISCHARGE NOTE NURSING/CASE MANAGEMENT/SOCIAL WORK - NSTRANSFEREYEGLASSESPAIRS_GEN_A_NUR
Patient was walking to his recliner with coffee and donuts this morning when he tripped on his slippers. Fell to the ground landing on his coccyx. Denies any LOC, did not hit head, no thinners. Only complaint is low back pain on scene, EMS gave 50 fentanyl that brought pain to 2/10. Currently is denying pain with resting in ED bed.   Wife POA is on her way per EMS.       Triage Assessment     Row Name 06/19/23 0752       Triage Assessment (Adult)    Airway WDL WDL    Additional Documentation Breath Sounds (Group)       Respiratory WDL    Respiratory WDL WDL       Skin Circulation/Temperature WDL    Skin Circulation/Temperature WDL X;temperature;circulation    Skin Circulation pallor    Skin Temperature neutral       Cardiac WDL    Cardiac WDL WDL       Peripheral/Neurovascular WDL    Peripheral Neurovascular WDL WDL       Cognitive/Neuro/Behavioral WDL    Cognitive/Neuro/Behavioral WDL WDL               1 pair

## 2023-12-07 NOTE — PROGRESS NOTE ADULT - PROBLEM SELECTOR PLAN 5
·  Problem: H/O mitral valve repair.   ·  Recommendation: hx of  MV Repair  mild mR on echo.
·  Problem: H/O mitral valve repair.   ·  Recommendation: hx of  MV Repair  mild mR on echo.

## 2023-12-07 NOTE — DISCHARGE NOTE PROVIDER - NSDCFUSCHEDAPPT_GEN_ALL_CORE_FT
Elie Christiansen  [facility]  CARDIOLOGY [address]  Scheduled Appointment: 12/13/2023    Elie Christiansen  [facility]  CARDIOLOGY [address]  Scheduled Appointment: 01/09/2024    Edwar Boland  [facility]  ELECTROPH [address] [place] [address]  Scheduled Appointment: 01/12/2024     Elie Christiansen  [facility]  CARDIOLOGY [address]  Scheduled Appointment: 12/13/2023    Elie Christiansen  [facility]  CARDIOLOGY [address]  Scheduled Appointment: 01/09/2024    Edwar Boland  [facility]  ELECTROPH [address] [place] [address]  Scheduled Appointment: 01/12/2024

## 2023-12-07 NOTE — DISCHARGE NOTE PROVIDER - NSDCMRMEDTOKEN_GEN_ALL_CORE_FT
Albuterol (Eqv-ProAir HFA) 90 mcg/inh inhalation aerosol: 2 puff(s) inhaled every 4 to 6 hours as needed for  shortness of breath and/or wheezing  allopurinol 100 mg oral tablet: 1 tab(s) orally once a day  aspirin 81 mg oral delayed release tablet: 1 tab(s) orally once a day  carvedilol 25 mg oral tablet: 0.5 tab(s) orally once a day (in the morning)  carvedilol 25 mg oral tablet: 1 tab(s) orally once a day (in the evening)  cefpodoxime 200 mg oral tablet: 1 tab(s) orally 2 times a day  chlorthalidone 25 mg oral tablet: 1 tab(s) orally once a day (in the evening)  chlorthalidone 25 mg oral tablet: 0.5 tab(s) orally once a day (in the morning)  clopidogrel 75 mg oral tablet: 1 tab(s) orally once a day  docusate sodium 100 mg oral capsule: 1 cap(s) orally 3 times a day  doxycycline hyclate 100 mg oral capsule: 1 cap(s) orally 2 times a day  ezetimibe 10 mg oral tablet: 1 tab(s) orally once a day  finasteride 5 mg oral tablet: 1 tab(s) orally once a day  Flovent  mcg/inh inhalation aerosol: 2 puff(s) inhaled 2 times a day  FLUoxetine 40 mg oral capsule: 1 cap(s) orally once a day  fluticasone 50 mcg/inh nasal spray: 2 spray(s) nasal 2 times a day  folic acid 1 mg oral tablet: 1 tab(s) orally once a day  guaiFENesin 600 mg oral tablet, extended release: 1 tab(s) orally every 12 hours  metFORMIN 500 mg oral tablet: 1 tab(s) orally once a day  potassium chloride 20 mEq oral tablet, extended release: 1 tab(s) orally once a day  predniSONE 20 mg oral tablet: 1 tab(s) orally once a day  rosuvastatin 40 mg oral tablet: 1 tab(s) orally once a day (in the evening)  Senna 8.6 mg oral tablet: 1 tab(s) orally once a day (at bedtime)  Symbicort 160 mcg-4.5 mcg/inh inhalation aerosol: 2 puff(s) inhaled 2 times a day

## 2023-12-07 NOTE — PROGRESS NOTE ADULT - ASSESSMENT
72 y/o male with above PMH, presented with unwitnessed traumatic fall/near syncope (left shoulder pain) after having 3 cocktails .  also has chronic hypokalemia with newly found LBBB. Troponin level was negative .   - keep K> 4 and Mg > 2  - negative stress test from 12/6/23  - NL EF, mod AS, f/u with cardiologist  - intermittent LBBB on tele without significant bradycardia or pauses, recommend MCOT on discharge with F/ U with Dr. Boland  Plan d/w pt//hospitalist/RN   74 y/o male with above PMH, presented with unwitnessed traumatic fall/near syncope (left shoulder pain) after having 3 cocktails .  also has chronic hypokalemia with newly found LBBB. Troponin level was negative .   - keep K> 4 and Mg > 2  - negative stress test from 12/6/23  - NL EF, mod AS, f/u with cardiologist  - intermittent LBBB on tele without significant bradycardia or pauses, recommend MCOT on discharge with F/ U with Dr. Boland  Plan d/w pt//hospitalist/RN

## 2023-12-07 NOTE — PROGRESS NOTE ADULT - SUBJECTIVE AND OBJECTIVE BOX
HPI: 73 year old male with PMhx of CAD, CVA and MVP, had 3 cocktails drinks followed by near syncope that was unwitnessed.  He states that he never had syncope prior to this event but does have occasional lightheadedness when changing positions from sitting to standing. He complains of new left shoulder pain.  He has been complaint with his medications and takes Coreg 12.5 mgs daily.  Last stents was 2020 years ago at .  EP called to further investigate syncope  and new LBBBB. TNI: negative x 2   PMHx of bladder stones, chronic hypokalemia on potassium supp,  BPH, CAD s/p stents x2 on Plavix and 81mg ASA, hematuria, HTN, HL    Pt is resting in the bed, denies chest pain/SOB/palpitations/dizziness, but c/o Lt shoulder pain since the fall,  No event overnight.  Tele: SR 60-80's rate related int. LBBB  Echo ( 12/5/23) LVEF 55-60%. mod dilated LA, mod AS  stress test (12/6/23) : no ischemia    ROS: All other ROS is negative unless indicated above.      Physical Exam:  Vital Signs Last 24 Hrs  T(C): 36.5 (07 Dec 2023 08:37), Max: 36.7 (06 Dec 2023 15:20)  T(F): 97.7 (07 Dec 2023 08:37), Max: 98.1 (06 Dec 2023 20:40)  HR: 58 (07 Dec 2023 08:37) (58 - 64)  BP: 139/76 (07 Dec 2023 08:37) (130/69 - 149/85)  RR: 18 (07 Dec 2023 08:37) (18 - 18)  SpO2: 95% (07 Dec 2023 08:37) (95% - 96%)    Parameters below as of 07 Dec 2023 08:37  Patient On (Oxygen Delivery Method): room air    Constitutional: well developed,  no deformities and no acute distress    Neurological: Alert & Oriented x 3, ANGEL, no focal deficits    HEENT: NC/AT, PERRLA, EOMI,  Neck supple.    Respiratory: CTA B/L, No wheezing/crackles/rhonchi    Cardiovascular: (+) S1 & S2, RRR, (+)RUDY    Gastrointestinal: soft, NT, nondistended, (+) BS    Genitourinary: non distended bladder, voiding freely    Extremities: No pedal edema, No clubbing, No cyanosis    Skin:  normal skin color and pigmentation, no skin lesions            Allergies    NSAIDs (Other)  amlodipine (Anaphylaxis)  lisinopril (Anaphylaxis)  Rosuvastatin Calcium (Swelling)  aspirin (Angioedema (Mild))      MEDICATIONS  (STANDING):  allopurinol 100 milliGRAM(s) Oral daily  aspirin enteric coated 81 milliGRAM(s) Oral daily  atorvastatin 80 milliGRAM(s) Oral at bedtime  carvedilol 12.5 milliGRAM(s) Oral daily  carvedilol 25 milliGRAM(s) Oral at bedtime  clopidogrel Tablet 75 milliGRAM(s) Oral daily  dextrose 5%. 1000 milliLiter(s) (50 mL/Hr) IV Continuous <Continuous>  dextrose 5%. 1000 milliLiter(s) (100 mL/Hr) IV Continuous <Continuous>  dextrose 50% Injectable 25 Gram(s) IV Push once  dextrose 50% Injectable 25 Gram(s) IV Push once  dextrose 50% Injectable 12.5 Gram(s) IV Push once  finasteride 5 milliGRAM(s) Oral daily  FLUoxetine 40 milliGRAM(s) Oral daily  fluticasone propionate 50 MICROgram(s)/spray Nasal Spray 2 Spray(s) Both Nostrils two times a day  folic acid 1 milliGRAM(s) Oral daily  glucagon  Injectable 1 milliGRAM(s) IntraMuscular once  guaiFENesin  milliGRAM(s) Oral every 12 hours  insulin lispro (ADMELOG) corrective regimen sliding scale   SubCutaneous three times a day before meals  potassium chloride    Tablet ER 20 milliEquivalent(s) Oral daily  sodium chloride 0.9%. 1000 milliLiter(s) (50 mL/Hr) IV Continuous <Continuous>    MEDICATIONS  (PRN):  albuterol    90 MICROgram(s) HFA Inhaler 2 Puff(s) Inhalation every 6 hours PRN for shortness of breath and/or wheezing  aluminum hydroxide/magnesium hydroxide/simethicone Suspension 30 milliLiter(s) Oral every 4 hours PRN Dyspepsia  dextrose Oral Gel 15 Gram(s) Oral once PRN Blood Glucose LESS THAN 70 milliGRAM(s)/deciliter  melatonin 3 milliGRAM(s) Oral at bedtime PRN Insomnia  ondansetron Injectable 4 milliGRAM(s) IV Push every 8 hours PRN Nausea and/or Vomiting    LABS:                        14.1   10.84 )-----------( 169      ( 06 Dec 2023 07:58 )             42.1     12-06    143  |  108  |  15  ----------------------------<  119<H>  3.0<L>   |  29  |  0.87    Ca    8.8      06 Dec 2023 07:58        Urinalysis Basic - ( 06 Dec 2023 07:58 )    Color: x / Appearance: x / SG: x / pH: x  Gluc: 119 mg/dL / Ketone: x  / Bili: x / Urobili: x   Blood: x / Protein: x / Nitrite: x   Leuk Esterase: x / RBC: x / WBC x   Sq Epi: x / Non Sq Epi: x / Bacteria: x

## 2023-12-07 NOTE — PROGRESS NOTE ADULT - PROBLEM SELECTOR PLAN 3
·  Problem: CAD (coronary artery disease).   ·  Recommendation: PCI may 2020 , feb 2022 continue statin , asa plavix, coreg
·  Problem: CAD (coronary artery disease).   ·  Recommendation: PCI may 2020 , feb 2022 continue statin , asa plavix, coreg

## 2023-12-07 NOTE — DISCHARGE NOTE PROVIDER - HOSPITAL COURSE
73 year old male with hx of  HTN HLD CAD PCI  May 2020 , feb 2022 Om1 stent , Prior MV repair 1998 CVA 2001 came to ER with complain that patient passed out at home last night while he was opening refrigerator , sustained  injury to left shoulder , patient says he did have 3 drinks an hour before the event , patient denies any symptoms prior to passing out ,and after waking , other left shoulder injury pain , no chest pain or shortness of breath or dizziness   , no prior syncopal episode , Patient says he was suffering with cough for last 3 weeks , did take antibiotics inhalers without much improvement , patient says he was not coughing prior to the event . patient ekg showed LBBB which is new compared to prior , normal troponin. patient admitted to tele for further evaluation       syncope  ++new LBBB likely ischemic in etiology  Cardiology and EP consulted  stress test negative  echo reviewed   resume BB and dapt, statin  ASA , statin  MCOT on discharge    DM  - hold metformin  - iss    Postnasal drip ./bronchitis  flonase inhalers  Constipation: hard stools  senna andcolace on discharge

## 2023-12-07 NOTE — PROGRESS NOTE ADULT - SUBJECTIVE AND OBJECTIVE BOX
CHIEF COMPLAINT: passed at home  last night     HPI: 73 year old male with hx of  HTN HLD CAD PCI  May 2020 , 2022 Om1 stent , Prior MV repair  CVA  came to ER with complain that patient passed out at home last night while he was opening refrigerator , sustained  injury to left shoulder , patient says he did have 3 drinks an hour before the event , patient denies any symptoms prior to passing out ,and after waking , other left shoulder injury pain , no chest pain or shortness of breath or dizziness   , no prior syncopal episode , Patient says he was suffering with cough for last 3 weeks , did take antibiotics inhalers without much improvement , patient says he was not coughing prior to the event ,     patient ekg showed LBBB which is new compared to prior , normal troponin     23: went for chemical stress test, tele: NSR 60s   23: no complaints, normal stress test, tele: NSR 70    MEDICATIONS:  Home Medications:  allopurinol 100 mg oral tablet: 1 tab(s) orally once a day (2022 11:34)  ezetimibe 10 mg oral tablet: 1 tab(s) orally once a day (2022 11:34)  finasteride 5 mg oral tablet: 1 tab(s) orally once a day (2022 11:34)  FLUoxetine 20 mg oral capsule: 1 cap(s) orally once a day (2022 11:34)  oxybutynin 5 mg oral tablet: 2 tab(s) orally once a day (in the morning) (2022 11:34)  rosuvastatin 40 mg oral tablet: 1 tab(s) orally once a day (2022 11:34)    MEDICATIONS  (STANDING):  allopurinol 100 milliGRAM(s) Oral daily  aspirin enteric coated 81 milliGRAM(s) Oral daily  atorvastatin 80 milliGRAM(s) Oral at bedtime  carvedilol 12.5 milliGRAM(s) Oral daily  carvedilol 25 milliGRAM(s) Oral at bedtime  clopidogrel Tablet 75 milliGRAM(s) Oral daily  dextrose 5%. 1000 milliLiter(s) (50 mL/Hr) IV Continuous <Continuous>  dextrose 5%. 1000 milliLiter(s) (100 mL/Hr) IV Continuous <Continuous>  dextrose 50% Injectable 25 Gram(s) IV Push once  dextrose 50% Injectable 12.5 Gram(s) IV Push once  dextrose 50% Injectable 25 Gram(s) IV Push once  finasteride 5 milliGRAM(s) Oral daily  FLUoxetine 40 milliGRAM(s) Oral daily  fluticasone propionate 50 MICROgram(s)/spray Nasal Spray 2 Spray(s) Both Nostrils two times a day  folic acid 1 milliGRAM(s) Oral daily  glucagon  Injectable 1 milliGRAM(s) IntraMuscular once  guaiFENesin  milliGRAM(s) Oral every 12 hours  insulin lispro (ADMELOG) corrective regimen sliding scale   SubCutaneous three times a day before meals  potassium chloride    Tablet ER 20 milliEquivalent(s) Oral daily  sodium chloride 0.9%. 1000 milliLiter(s) (50 mL/Hr) IV Continuous <Continuous>    Vital Signs Last 24 Hrs  T(C): 36.5 (07 Dec 2023 08:37), Max: 36.7 (06 Dec 2023 15:20)  T(F): 97.7 (07 Dec 2023 08:37), Max: 98.1 (06 Dec 2023 20:40)  HR: 58 (07 Dec 2023 08:37) (58 - 64)  BP: 139/76 (07 Dec 2023 08:37) (130/69 - 149/85)  BP(mean): --  RR: 18 (07 Dec 2023 08:37) (18 - 18)  SpO2: 95% (07 Dec 2023 08:37) (95% - 96%)    Parameters below as of 07 Dec 2023 08:37  Patient On (Oxygen Delivery Method): room air      PHYSICAL EXAM:    Constitutional: NAD, awake and alert, well-developed  HEENT: PERR, EOMI,  No oral cyananosis.  Neck:  supple,  No JVD  Respiratory: Breath sounds are clear bilaterally, No wheezing, rales or rhonchi  Cardiovascular: S1 and S2, regular rate and rhythm, no Murmurs, gallops or rubs  Gastrointestinal: Bowel Sounds present, soft, nontender.   Extremities: No peripheral edema. No clubbing or cyanosis.  Vascular: 2+ peripheral pulses  Neurological: A/O x 3, no focal deficits  Musculoskeletal: no calf tenderness.  Skin: No rashes.      LABS: All Labs Reviewed:                         14.1   10.84 )-----------( 169      ( 06 Dec 2023 07:58 )             42.1     12-    143  |  108  |  15  ----------------------------<  119<H>  3.0<L>   |  29  |  0.87    Ca    8.8      06 Dec 2023 07:58      - TroponinI hsT: <-9.44, <-12.93    Radiology/TTE/Stress test: reviewed    < from: NM Nuclear Stress Pharmacologic Multiple (23 @ 11:30) >  IMPRESSION: Normal SPECT Myocardial Perfusion Imaging.    No evidence of reversible perfusion defects/no evidence of impaired   perfusion reserve.    Normal left ventricular contractility with an ejection fraction of 65%   (Normal: 50% or greater).    No regional wall motion abnormalities.    Please refer to cardiac stress test report for dosage of Regadenoson   administered, EKG findings and symptoms during the procedure.    < end of copied text >      < from: TTE Echo Complete w/o Contrast w/ Doppler (23 @ 15:40) >   Impression     Summary     Estimated left ventricular ejection fraction is 55-60 %.   The left ventricle is normal in size, wall motion and contractility as   seen in limited views.   Mild concentric left ventricular hypertrophy is present.   The left atrium is moderately dilated.   The aortic valve is well visualized, appears moderately calcified. Valve   opening seems to be restricted.   Moderate aortic stenosis is present.   Calculated HAL is 1.4 cm2.   Peak and mean transaortic gradients are 40 and 26 mmHg respectively.   Mild (1+) aortic regurgitation is present.   Mitral valve repair is present and intact. The Mean mitral gradient of7   mmHg appears elevated.   Mild mitral annular calcification is present.   Mild (1+) mitral regurgitation is present.   EA reversal of the mitral inflow consistent with reduced compliance of   the   left ventricle.   Normal appearing tricuspid valve structure.   Mild (1+) tricuspid valve regurgitation is present.   Normal appearing pulmonic valve structure.   Mild pulmonic valvular regurgitation (1+) is present.     Signature     ----------------------------------------------------------------   Electronically signed by Isak Brian MD(Interpreting   physician) on 2023 08:07 PM   ----------------------------------------------------------------    < end of copied text >      -------------------------------------------------------------------------------------------------  < from: Cardiac Catheterization (22 @ 18:58) >  Cardiac Arteries and Lesion Findings    LMCA: Mild diffuse atherosclerosis    LAD: Diffuse mild atherosclerosis  Engaged with JL4 with prolapse of catheter    LCx:     1st Ob Marie% stenosis reduced to 1%.     Devices used     * 6FR JL 4.0 LAUNCHER     * .014 Asahi [id number]     * TELESCOPE     * Euphora SC     Diameter: 2.5 mm. Length: 15 mm. Total duration: 10 sec.1 inflation(s).   to a max pressure of: 12 JACINTO.     * Resolute JOHN JAH     Diameter: 3 mm. Length: 15 mm. Total duration: 9 sec.1 inflation(s). to   a max pressure of: 12 JACINTO.    RCA: Engaged with Ultra however, limited catheter purchase     Mid RCA: 60% stenosis.Pre procedure LISA III flow was noted.     Comments:Locatedin mid portion of the vessel.     Devices used     Dist RCA: 50% stenosis.     Comments:Lesion located prior to the stent, in stent restenosis     Devices used     Coronary tree     Dominance: Right     Impression     Diagnostic Conclusions   Critical OM1 of large territory as culprit lesion of patient's NSTEMI   presentation.     Interventional Conclusions     Successful JAH placement to ostial OM1 lesion resulting in LISA 3 flow   and   no chest pain at the end of the procedure.     Recommendations     Maintain on ticagrelor (180 mg load in lad) with maintenance 90 mg bid,   with cilostazol 100 mg bid which patient was on in the past. Patient with   aspirin allergy with failed desensitisation in the past. Recommend   aggressive medical management of CAD as per ACC/AHA guidelines, including   lifestyle changes.    Estimated Blood Loss:5ml.    Complications:  No complication.     Signatures    < end of copied text >  < from: TTE Echo Complete w/o Contrast w/ Doppler (22 @ 16:31) >     Mild concentric left ventricular hypertrophy is present.   The left ventricle is normal in size.   There are mild regional wall motion abnormalities:   mid anterolateral & inferolateral segments are hypokinetic.   Overall systolic function is preserved. EF=55-60 %.   The left atrium is mildly dilated.   Mild (1+) aortic regurgitation.   Mild (1+) mitral regurgitation.   Mitral valve repair is noted with normal mitral valve function.   Mild (1+) tricuspid valve regurgitation. Normal PA systolic pressures.   Mild pulmonic valvular regurgitation.   Aortic root & ascending aorta are mildly dilated.      < end of copied text >        RADIOLOGY/EKG:    sinus rhythm LBBB  ( new changes compared to previous ekg )  CHIEF COMPLAINT: passed at home  last night     HPI: 73 year old male with hx of  HTN HLD CAD PCI  May 2020 , 2022 Om1 stent , Prior MV repair  CVA  came to ER with complain that patient passed out at home last night while he was opening refrigerator , sustained  injury to left shoulder , patient says he did have 3 drinks an hour before the event , patient denies any symptoms prior to passing out ,and after waking , other left shoulder injury pain , no chest pain or shortness of breath or dizziness   , no prior syncopal episode , Patient says he was suffering with cough for last 3 weeks , did take antibiotics inhalers without much improvement , patient says he was not coughing prior to the event ,     patient ekg showed LBBB which is new compared to prior , normal troponin     23: went for chemical stress test, tele: NSR 60s   23: no complaints, normal stress test, tele: NSR 70    MEDICATIONS:  Home Medications:  allopurinol 100 mg oral tablet: 1 tab(s) orally once a day (2022 11:34)  ezetimibe 10 mg oral tablet: 1 tab(s) orally once a day (2022 11:34)  finasteride 5 mg oral tablet: 1 tab(s) orally once a day (2022 11:34)  FLUoxetine 20 mg oral capsule: 1 cap(s) orally once a day (2022 11:34)  oxybutynin 5 mg oral tablet: 2 tab(s) orally once a day (in the morning) (2022 11:34)  rosuvastatin 40 mg oral tablet: 1 tab(s) orally once a day (2022 11:34)    MEDICATIONS  (STANDING):  allopurinol 100 milliGRAM(s) Oral daily  aspirin enteric coated 81 milliGRAM(s) Oral daily  atorvastatin 80 milliGRAM(s) Oral at bedtime  carvedilol 12.5 milliGRAM(s) Oral daily  carvedilol 25 milliGRAM(s) Oral at bedtime  clopidogrel Tablet 75 milliGRAM(s) Oral daily  dextrose 5%. 1000 milliLiter(s) (50 mL/Hr) IV Continuous <Continuous>  dextrose 5%. 1000 milliLiter(s) (100 mL/Hr) IV Continuous <Continuous>  dextrose 50% Injectable 25 Gram(s) IV Push once  dextrose 50% Injectable 12.5 Gram(s) IV Push once  dextrose 50% Injectable 25 Gram(s) IV Push once  finasteride 5 milliGRAM(s) Oral daily  FLUoxetine 40 milliGRAM(s) Oral daily  fluticasone propionate 50 MICROgram(s)/spray Nasal Spray 2 Spray(s) Both Nostrils two times a day  folic acid 1 milliGRAM(s) Oral daily  glucagon  Injectable 1 milliGRAM(s) IntraMuscular once  guaiFENesin  milliGRAM(s) Oral every 12 hours  insulin lispro (ADMELOG) corrective regimen sliding scale   SubCutaneous three times a day before meals  potassium chloride    Tablet ER 20 milliEquivalent(s) Oral daily  sodium chloride 0.9%. 1000 milliLiter(s) (50 mL/Hr) IV Continuous <Continuous>    Vital Signs Last 24 Hrs  T(C): 36.5 (07 Dec 2023 08:37), Max: 36.7 (06 Dec 2023 15:20)  T(F): 97.7 (07 Dec 2023 08:37), Max: 98.1 (06 Dec 2023 20:40)  HR: 58 (07 Dec 2023 08:37) (58 - 64)  BP: 139/76 (07 Dec 2023 08:37) (130/69 - 149/85)  BP(mean): --  RR: 18 (07 Dec 2023 08:37) (18 - 18)  SpO2: 95% (07 Dec 2023 08:37) (95% - 96%)    Parameters below as of 07 Dec 2023 08:37  Patient On (Oxygen Delivery Method): room air      PHYSICAL EXAM:    Constitutional: NAD, awake and alert, well-developed  HEENT: PERR, EOMI,  No oral cyananosis.  Neck:  supple,  No JVD  Respiratory: Breath sounds are clear bilaterally, No wheezing, rales or rhonchi  Cardiovascular: S1 and S2, regular rate and rhythm, no Murmurs, gallops or rubs  Gastrointestinal: Bowel Sounds present, soft, nontender.   Extremities: No peripheral edema. No clubbing or cyanosis.  Vascular: 2+ peripheral pulses  Neurological: A/O x 3, no focal deficits  Musculoskeletal: no calf tenderness.  Skin: No rashes.      LABS: All Labs Reviewed:                         14.1   10.84 )-----------( 169      ( 06 Dec 2023 07:58 )             42.1     12-    143  |  108  |  15  ----------------------------<  119<H>  3.0<L>   |  29  |  0.87    Ca    8.8      06 Dec 2023 07:58      - TroponinI hsT: <-9.44, <-12.93    Radiology/TTE/Stress test: reviewed    < from: NM Nuclear Stress Pharmacologic Multiple (23 @ 11:30) >  IMPRESSION: Normal SPECT Myocardial Perfusion Imaging.    No evidence of reversible perfusion defects/no evidence of impaired   perfusion reserve.    Normal left ventricular contractility with an ejection fraction of 65%   (Normal: 50% or greater).    No regional wall motion abnormalities.    Please refer to cardiac stress test report for dosage of Regadenoson   administered, EKG findings and symptoms during the procedure.    < end of copied text >      < from: TTE Echo Complete w/o Contrast w/ Doppler (23 @ 15:40) >   Impression     Summary     Estimated left ventricular ejection fraction is 55-60 %.   The left ventricle is normal in size, wall motion and contractility as   seen in limited views.   Mild concentric left ventricular hypertrophy is present.   The left atrium is moderately dilated.   The aortic valve is well visualized, appears moderately calcified. Valve   opening seems to be restricted.   Moderate aortic stenosis is present.   Calculated HAL is 1.4 cm2.   Peak and mean transaortic gradients are 40 and 26 mmHg respectively.   Mild (1+) aortic regurgitation is present.   Mitral valve repair is present and intact. The Mean mitral gradient of7   mmHg appears elevated.   Mild mitral annular calcification is present.   Mild (1+) mitral regurgitation is present.   EA reversal of the mitral inflow consistent with reduced compliance of   the   left ventricle.   Normal appearing tricuspid valve structure.   Mild (1+) tricuspid valve regurgitation is present.   Normal appearing pulmonic valve structure.   Mild pulmonic valvular regurgitation (1+) is present.     Signature     ----------------------------------------------------------------   Electronically signed by Isak Brian MD(Interpreting   physician) on 2023 08:07 PM   ----------------------------------------------------------------    < end of copied text >      -------------------------------------------------------------------------------------------------  < from: Cardiac Catheterization (22 @ 18:58) >  Cardiac Arteries and Lesion Findings    LMCA: Mild diffuse atherosclerosis    LAD: Diffuse mild atherosclerosis  Engaged with JL4 with prolapse of catheter    LCx:     1st Ob Marie% stenosis reduced to 1%.     Devices used     * 6FR JL 4.0 LAUNCHER     * .014 Asahi [id number]     * TELESCOPE     * Euphora SC     Diameter: 2.5 mm. Length: 15 mm. Total duration: 10 sec.1 inflation(s).   to a max pressure of: 12 JACINTO.     * Resolute JOHN JAH     Diameter: 3 mm. Length: 15 mm. Total duration: 9 sec.1 inflation(s). to   a max pressure of: 12 JACINTO.    RCA: Engaged with Ultra however, limited catheter purchase     Mid RCA: 60% stenosis.Pre procedure LISA III flow was noted.     Comments:Locatedin mid portion of the vessel.     Devices used     Dist RCA: 50% stenosis.     Comments:Lesion located prior to the stent, in stent restenosis     Devices used     Coronary tree     Dominance: Right     Impression     Diagnostic Conclusions   Critical OM1 of large territory as culprit lesion of patient's NSTEMI   presentation.     Interventional Conclusions     Successful JAH placement to ostial OM1 lesion resulting in LISA 3 flow   and   no chest pain at the end of the procedure.     Recommendations     Maintain on ticagrelor (180 mg load in lad) with maintenance 90 mg bid,   with cilostazol 100 mg bid which patient was on in the past. Patient with   aspirin allergy with failed desensitisation in the past. Recommend   aggressive medical management of CAD as per ACC/AHA guidelines, including   lifestyle changes.    Estimated Blood Loss:5ml.    Complications:  No complication.     Signatures    < end of copied text >  < from: TTE Echo Complete w/o Contrast w/ Doppler (22 @ 16:31) >     Mild concentric left ventricular hypertrophy is present.   The left ventricle is normal in size.   There are mild regional wall motion abnormalities:   mid anterolateral & inferolateral segments are hypokinetic.   Overall systolic function is preserved. EF=55-60 %.   The left atrium is mildly dilated.   Mild (1+) aortic regurgitation.   Mild (1+) mitral regurgitation.   Mitral valve repair is noted with normal mitral valve function.   Mild (1+) tricuspid valve regurgitation. Normal PA systolic pressures.   Mild pulmonic valvular regurgitation.   Aortic root & ascending aorta are mildly dilated.      < end of copied text >        RADIOLOGY/EKG:    sinus rhythm LBBB  ( new changes compared to previous ekg )    REASON FOR VISIT:  Syncope    HPI: 73 year old male with hx of  HTN HLD CAD PCI  May 2020 , 2022 Om1 stent , Prior MV repair  CVA  came to ER with complain that patient passed out at home last night while he was opening refrigerator , sustained  injury to left shoulder , patient says he did have 3 drinks an hour before the event , patient denies any symptoms prior to passing out ,and after waking , other left shoulder injury pain , no chest pain or shortness of breath or dizziness   , no prior syncopal episode , Patient says he was suffering with cough for last 3 weeks , did take antibiotics inhalers without much improvement , patient says he was not coughing prior to the event ,     patient ekg showed LBBB which is new compared to prior , normal troponin     23: went for chemical stress test, tele: NSR 60s   23: no complaints, normal stress test, tele: NSR 70    MEDICATIONS:  Home Medications:  allopurinol 100 mg oral tablet: 1 tab(s) orally once a day (2022 11:34)  ezetimibe 10 mg oral tablet: 1 tab(s) orally once a day (2022 11:34)  finasteride 5 mg oral tablet: 1 tab(s) orally once a day (2022 11:34)  FLUoxetine 20 mg oral capsule: 1 cap(s) orally once a day (2022 11:34)  oxybutynin 5 mg oral tablet: 2 tab(s) orally once a day (in the morning) (2022 11:34)  rosuvastatin 40 mg oral tablet: 1 tab(s) orally once a day (2022 11:34)    MEDICATIONS  (STANDING):  allopurinol 100 milliGRAM(s) Oral daily  aspirin enteric coated 81 milliGRAM(s) Oral daily  atorvastatin 80 milliGRAM(s) Oral at bedtime  carvedilol 12.5 milliGRAM(s) Oral daily  carvedilol 25 milliGRAM(s) Oral at bedtime  clopidogrel Tablet 75 milliGRAM(s) Oral daily  dextrose 5%. 1000 milliLiter(s) (50 mL/Hr) IV Continuous <Continuous>  dextrose 5%. 1000 milliLiter(s) (100 mL/Hr) IV Continuous <Continuous>  dextrose 50% Injectable 25 Gram(s) IV Push once  dextrose 50% Injectable 12.5 Gram(s) IV Push once  dextrose 50% Injectable 25 Gram(s) IV Push once  finasteride 5 milliGRAM(s) Oral daily  FLUoxetine 40 milliGRAM(s) Oral daily  fluticasone propionate 50 MICROgram(s)/spray Nasal Spray 2 Spray(s) Both Nostrils two times a day  folic acid 1 milliGRAM(s) Oral daily  glucagon  Injectable 1 milliGRAM(s) IntraMuscular once  guaiFENesin  milliGRAM(s) Oral every 12 hours  insulin lispro (ADMELOG) corrective regimen sliding scale   SubCutaneous three times a day before meals  potassium chloride    Tablet ER 20 milliEquivalent(s) Oral daily  sodium chloride 0.9%. 1000 milliLiter(s) (50 mL/Hr) IV Continuous <Continuous>    Vital Signs Last 24 Hrs  T(C): 36.5 (07 Dec 2023 08:37), Max: 36.7 (06 Dec 2023 15:20)  T(F): 97.7 (07 Dec 2023 08:37), Max: 98.1 (06 Dec 2023 20:40)  HR: 58 (07 Dec 2023 08:37) (58 - 64)  BP: 139/76 (07 Dec 2023 08:37) (130/69 - 149/85)  RR: 18 (07 Dec 2023 08:37) (18 - 18)  SpO2: 95% (07 Dec 2023 08:37) (95% - 96%)  Patient On (Oxygen Delivery Method): room air      PHYSICAL EXAM:    Constitutional: NAD, awake and alert, well-developed  HEENT: PERR, EOMI,  No oral cyanosis.  Neck:  supple,  No JVD  Respiratory: Breath sounds are clear bilaterally, No wheezing, rales or rhonchi  Cardiovascular: S1 and S2, regular rate and rhythm, no Murmurs, gallops or rubs  Gastrointestinal: Bowel Sounds present, soft, nontender.   Extremities: No peripheral edema. No clubbing or cyanosis.  Vascular: 2+ peripheral pulses  Neurological: A/O x 3, no focal deficits  Musculoskeletal: no calf tenderness.  Skin: No rashes.    LABS:                        14.1   10.84 )-----------( 169      ( 06 Dec 2023 07:58 )             42.1     12-06    143  |  108  |  15  ----------------------------<  119<H>  3.0<L>   |  29  |  0.87    Ca    8.8      06 Dec 2023 07:58      - TroponinI hsT: <-9.44, <-12.93    NM Nuclear Stress Pharmacologic Multiple (23 @ 11:30):  IMPRESSION: Normal SPECT Myocardial Perfusion Imaging.  No evidence of reversible perfusion defects/no evidence of impaired perfusion reserve.  Normal left ventricular contractility with an ejection fraction of 65%  (Normal: 50% or greater).  No regional wall motion abnormalities.    TTE Echo Complete w/o Contrast w/ Doppler (23 @ 15:40) >   Estimated left ventricular ejection fraction is 55-60 %.   The left ventricle is normal in size, wall motion and contractility as   seen in limited views.   Mild concentric left ventricular hypertrophy is present.   The left atrium is moderately dilated.   The aortic valve is well visualized, appears moderately calcified. Valve   opening seems to be restricted.   Moderate aortic stenosis is present.   Calculated HAL is 1.4 cm2.   Peak and mean transaortic gradients are 40 and 26 mmHg respectively.   Mild (1+) aortic regurgitation is present.   Mitral valve repair is present and intact. The Mean mitral gradient of7   mmHg appears elevated.   Mild mitral annular calcification is present.   Mild (1+) mitral regurgitation is present.   EA reversal of the mitral inflow consistent with reduced compliance of   the   left ventricle.   Normal appearing tricuspid valve structure.   Mild (1+) tricuspid valve regurgitation is present.   Normal appearing pulmonic valve structure.   Mild pulmonic valvular regurgitation (1+) is present.    Cardiac Catheterization (22 @ 18:58):  LMCA: Mild diffuse atherosclerosis  LAD: Diffuse mild atherosclerosis  LCx:   1st Ob Marie% stenosis reduced to 1%.  Resolute JOHN JAH  Mid RCA: 60% stenosis.   Dist RCA: 50% stenosis. Lesion located prior to the stent, in stent restenosis   Critical OM1 of large territory as culprit lesion of patient's NSTEMI presentation.   Interventional Conclusions:     Successful JAH placement to ostial OM1 lesion resulting in LISA 3 flow and no chest pain at the end of the procedure.

## 2023-12-07 NOTE — PROGRESS NOTE ADULT - SUBJECTIVE AND OBJECTIVE BOX
Patient seen and examined  stress negative  reports nasaldrip and hardstools  for MCOT today  Review of Systems:  General:denies fever chills, headache, weakness  HEENT: denies blurry vision,diffculty swallowing, difficulty hearing, tinnitus  Cardiovascular: denies chest pain  ,palpitations  Pulmonary:denies shortness of breath, cough, wheezing, hemoptysis  Gastrointestinal: denies abdominal pain, constipation, diarrhea,nausea , vomiting, hematochezia  : denies hematuria, dysuria, or incontinence  Neurological: denies weakness, numbness , tingling, dizziness, tremors  MSK: denies muscle pain, difficulty ambulating, swelling, back pain  skin: denies skin rash, itching, burning, or  skin lesions  Psychiatrical: denies mood disturbances, anxierty, feeling depressed, depression , or difficulty sleeping    Objective:  Vitals  T(C): 36.5 (12-07-23 @ 08:37), Max: 36.7 (12-06-23 @ 15:20)  HR: 58 (12-07-23 @ 08:37) (58 - 64)  BP: 139/76 (12-07-23 @ 08:37) (130/69 - 139/76)  RR: 18 (12-07-23 @ 08:37) (18 - 18)  SpO2: 95% (12-07-23 @ 08:37) (95% - 96%)    Physical Exam:  General: comfortable, no acute distress  HEENT: Atraumatic, no LAD, trachea midline, PERRLA  Cardiovascular: normal s1s2, no murmurs, gallops or fricition rubs  Pulmonary: clear to ausculation Bilaterally, no wheezing , rhonchi  Gastrointestinal: soft non tender non distended, no masses felt, no organomegally  Muscloskeletal: no lower extremity edema, intact bilateral lower extremity pulses  Neurological: CN II-12 intact. No focal weakness  Psychiatrical: normal mood, cooperative  SKIN: no rash, lesions or ulcers    Labs:                          14.1   10.84 )-----------( 169      ( 06 Dec 2023 07:58 )             42.1     12-07    140  |  105  |  21  ----------------------------<  120<H>  3.4<L>   |  29  |  1.09    Ca    8.9      07 Dec 2023 11:32              Active Medications  MEDICATIONS  (STANDING):  allopurinol 100 milliGRAM(s) Oral daily  aspirin enteric coated 81 milliGRAM(s) Oral daily  atorvastatin 80 milliGRAM(s) Oral at bedtime  carvedilol 12.5 milliGRAM(s) Oral daily  carvedilol 25 milliGRAM(s) Oral at bedtime  clopidogrel Tablet 75 milliGRAM(s) Oral daily  dextrose 5%. 1000 milliLiter(s) (50 mL/Hr) IV Continuous <Continuous>  dextrose 5%. 1000 milliLiter(s) (100 mL/Hr) IV Continuous <Continuous>  dextrose 50% Injectable 25 Gram(s) IV Push once  dextrose 50% Injectable 25 Gram(s) IV Push once  dextrose 50% Injectable 12.5 Gram(s) IV Push once  finasteride 5 milliGRAM(s) Oral daily  FLUoxetine 40 milliGRAM(s) Oral daily  fluticasone propionate 50 MICROgram(s)/spray Nasal Spray 2 Spray(s) Both Nostrils two times a day  folic acid 1 milliGRAM(s) Oral daily  glucagon  Injectable 1 milliGRAM(s) IntraMuscular once  guaiFENesin  milliGRAM(s) Oral every 12 hours  insulin lispro (ADMELOG) corrective regimen sliding scale   SubCutaneous three times a day before meals  potassium chloride    Tablet ER 20 milliEquivalent(s) Oral daily  sodium chloride 0.9%. 1000 milliLiter(s) (50 mL/Hr) IV Continuous <Continuous>    MEDICATIONS  (PRN):  albuterol    90 MICROgram(s) HFA Inhaler 2 Puff(s) Inhalation every 6 hours PRN for shortness of breath and/or wheezing  aluminum hydroxide/magnesium hydroxide/simethicone Suspension 30 milliLiter(s) Oral every 4 hours PRN Dyspepsia  dextrose Oral Gel 15 Gram(s) Oral once PRN Blood Glucose LESS THAN 70 milliGRAM(s)/deciliter  melatonin 3 milliGRAM(s) Oral at bedtime PRN Insomnia  ondansetron Injectable 4 milliGRAM(s) IV Push every 8 hours PRN Nausea and/or Vomiting

## 2023-12-07 NOTE — PROGRESS NOTE ADULT - NS ATTEND AMEND GEN_ALL_CORE FT
Never
Plan of care discussed with NP. Agree with plan of care.
Case discussed with NP; normal nuclear stress test; management as described above.

## 2023-12-07 NOTE — DISCHARGE NOTE NURSING/CASE MANAGEMENT/SOCIAL WORK - NSDCPEFALRISK_GEN_ALL_CORE
For information on Fall & Injury Prevention, visit: [email or web address] OR  [email or web address] OR  https://www.cdc.gov/steadi/patient.html For information on Fall & Injury Prevention, visit: [email or web address] OR  [email or web address] OR  https://www.cdc.gov/steadi/patient.html

## 2023-12-07 NOTE — PROGRESS NOTE ADULT - PROBLEM SELECTOR PLAN 1
pt. had near syncope, denies LOC, TTE with preserved LVEF, moderate AS which is new from TTE from 2/2022, EKG with new with LBBB, negative trops x 2,   - pt. had a normal nuclear stress test on 12/6/23  EP consult appreciated - MCOT on discharge and followup with Dr. Boland, will also followup outpatient with Dr. Christiansen (pt's cardiologist) in 1-2 weeks    pt. is stable from cardiac standpoint, can return to work on Monday, advised to increase po hydration, will sign off

## 2023-12-11 DIAGNOSIS — N40.0 BENIGN PROSTATIC HYPERPLASIA WITHOUT LOWER URINARY TRACT SYMPTOMS: ICD-10-CM

## 2023-12-11 DIAGNOSIS — Z88.8 ALLERGY STATUS TO OTHER DRUGS, MEDICAMENTS AND BIOLOGICAL SUBSTANCES: ICD-10-CM

## 2023-12-11 DIAGNOSIS — Z88.6 ALLERGY STATUS TO ANALGESIC AGENT: ICD-10-CM

## 2023-12-11 DIAGNOSIS — R55 SYNCOPE AND COLLAPSE: ICD-10-CM

## 2023-12-11 DIAGNOSIS — I10 ESSENTIAL (PRIMARY) HYPERTENSION: ICD-10-CM

## 2023-12-11 DIAGNOSIS — Z99.89 DEPENDENCE ON OTHER ENABLING MACHINES AND DEVICES: ICD-10-CM

## 2023-12-11 DIAGNOSIS — I25.10 ATHEROSCLEROTIC HEART DISEASE OF NATIVE CORONARY ARTERY WITHOUT ANGINA PECTORIS: ICD-10-CM

## 2023-12-11 DIAGNOSIS — Z87.442 PERSONAL HISTORY OF URINARY CALCULI: ICD-10-CM

## 2023-12-11 DIAGNOSIS — E78.5 HYPERLIPIDEMIA, UNSPECIFIED: ICD-10-CM

## 2023-12-11 DIAGNOSIS — E87.6 HYPOKALEMIA: ICD-10-CM

## 2023-12-11 DIAGNOSIS — K59.00 CONSTIPATION, UNSPECIFIED: ICD-10-CM

## 2023-12-11 DIAGNOSIS — Z86.73 PERSONAL HISTORY OF TRANSIENT ISCHEMIC ATTACK (TIA), AND CEREBRAL INFARCTION WITHOUT RESIDUAL DEFICITS: ICD-10-CM

## 2023-12-11 DIAGNOSIS — G47.30 SLEEP APNEA, UNSPECIFIED: ICD-10-CM

## 2023-12-11 DIAGNOSIS — Z96.643 PRESENCE OF ARTIFICIAL HIP JOINT, BILATERAL: ICD-10-CM

## 2023-12-11 DIAGNOSIS — J40 BRONCHITIS, NOT SPECIFIED AS ACUTE OR CHRONIC: ICD-10-CM

## 2023-12-11 DIAGNOSIS — Z79.02 LONG TERM (CURRENT) USE OF ANTITHROMBOTICS/ANTIPLATELETS: ICD-10-CM

## 2023-12-11 DIAGNOSIS — I44.7 LEFT BUNDLE-BRANCH BLOCK, UNSPECIFIED: ICD-10-CM

## 2023-12-11 DIAGNOSIS — Z95.5 PRESENCE OF CORONARY ANGIOPLASTY IMPLANT AND GRAFT: ICD-10-CM

## 2023-12-11 DIAGNOSIS — Z95.2 PRESENCE OF PROSTHETIC HEART VALVE: ICD-10-CM

## 2023-12-11 DIAGNOSIS — Z79.84 LONG TERM (CURRENT) USE OF ORAL HYPOGLYCEMIC DRUGS: ICD-10-CM

## 2023-12-14 ENCOUNTER — APPOINTMENT (OUTPATIENT)
Dept: CARDIOLOGY | Facility: CLINIC | Age: 73
End: 2023-12-14
Payer: MEDICARE

## 2023-12-14 ENCOUNTER — NON-APPOINTMENT (OUTPATIENT)
Age: 73
End: 2023-12-14

## 2023-12-14 VITALS
OXYGEN SATURATION: 97 % | BODY MASS INDEX: 27.31 KG/M2 | WEIGHT: 236 LBS | HEART RATE: 59 BPM | DIASTOLIC BLOOD PRESSURE: 83 MMHG | SYSTOLIC BLOOD PRESSURE: 169 MMHG | HEIGHT: 78 IN

## 2023-12-14 PROCEDURE — 93000 ELECTROCARDIOGRAM COMPLETE: CPT

## 2023-12-14 PROCEDURE — 99214 OFFICE O/P EST MOD 30 MIN: CPT

## 2023-12-14 RX ORDER — CLOPIDOGREL BISULFATE 75 MG/1
75 TABLET, FILM COATED ORAL DAILY
Qty: 90 | Refills: 1 | Status: DISCONTINUED | COMMUNITY
Start: 2023-02-17 | End: 2023-12-14

## 2023-12-14 NOTE — ASSESSMENT
[FreeTextEntry1] : A/P:  -Cont. MCOT monitoring. -Will followup re: results after completion. -Also has apt w/ Dr. Boland to review MCOT results. -Suspect near syncopal episode was related to lack of sleep due to cough.  Cough also prevented use of CPAP further reducing adequate sleep  Followup in 3-4 weeks.

## 2023-12-14 NOTE — HISTORY OF PRESENT ILLNESS
[FreeTextEntry1] : [id number]  KASSY KRAMER  May 30 1950  72 y/o  *NSTEMI-s/p PCI OM1 Feb '22, s/p PCI RCA May '20 *prior suspected ASA allergy May '20, ASA challenge neg Mar '22. *AS-moderate *HTN  here for followup. Since last visit, admitted to [facility] after syncopal episode. ECG w/ new LBBB never seen on prior ECGs in clinic or hospital. Admitted EP Consulted, MCOT monitor given. MPI w normal perfusion. Tele neg.  D/c'd  Since discharge no chest pain, dyspena, palpitatins.  ECG NSR old infarct no ischemia LBBB no longer present.  Echo Dec-5-'23: EF 55-60% mod LAE mild lVH moderate AS HAL 1.4 cm2 mild AR MV repair  MPI Dec-5-'23: normal perfusion EF 65%  -------------------------------------- PRIOR CARDIAC TESTING: EKG: NSR, LAE, Old inferior MI. Echo: 5/2020, LAE, MV repair with +1 MR, no pericardial effusion., normal LV function LVEF 55-60%.  Cath Feb '22: "JAH to ostial OM1 w/ TIMI3...maintain on ticagrelor, 180 load in lab then 90 BID w/ cilostazol 100 BID (failed ASA desensitization)." LM diffuse disease, LAD diffuse disease, LCx OM1 99% stenosis RCA mid 60% RCA distal 50% (RCA stent noted)  Cardiac Cath: 5/2020, 1 Vessel CAD with inferior hypokinesis. Stent: 5/2020, JAH to RCA --------------------------------------

## 2023-12-20 LAB
ALBUMIN SERPL ELPH-MCNC: 4.4 G/DL
ALP BLD-CCNC: 64 U/L
ALT SERPL-CCNC: 24 U/L
AST SERPL-CCNC: 33 U/L
BILIRUB DIRECT SERPL-MCNC: 0.4 MG/DL
BILIRUB INDIRECT SERPL-MCNC: 0.9 MG/DL
BILIRUB SERPL-MCNC: 1.3 MG/DL
CHOLEST SERPL-MCNC: 97 MG/DL
HDLC SERPL-MCNC: 43 MG/DL
LDLC SERPL CALC-MCNC: 33 MG/DL
NONHDLC SERPL-MCNC: 54 MG/DL
PROT SERPL-MCNC: 7 G/DL
TRIGL SERPL-MCNC: 112 MG/DL

## 2024-01-09 ENCOUNTER — APPOINTMENT (OUTPATIENT)
Dept: CARDIOLOGY | Facility: CLINIC | Age: 74
End: 2024-01-09
Payer: MEDICARE

## 2024-01-09 VITALS
HEIGHT: 73 IN | BODY MASS INDEX: 32.2 KG/M2 | DIASTOLIC BLOOD PRESSURE: 78 MMHG | SYSTOLIC BLOOD PRESSURE: 128 MMHG | OXYGEN SATURATION: 97 % | WEIGHT: 243 LBS | HEART RATE: 72 BPM

## 2024-01-09 PROCEDURE — 93000 ELECTROCARDIOGRAM COMPLETE: CPT

## 2024-01-09 PROCEDURE — 99214 OFFICE O/P EST MOD 30 MIN: CPT

## 2024-01-09 RX ORDER — CLOPIDOGREL BISULFATE 75 MG/1
75 TABLET, FILM COATED ORAL DAILY
Qty: 3 | Refills: 3 | Status: ACTIVE | COMMUNITY
Start: 2024-01-09 | End: 1900-01-01

## 2024-01-12 ENCOUNTER — APPOINTMENT (OUTPATIENT)
Dept: ELECTROPHYSIOLOGY | Facility: CLINIC | Age: 74
End: 2024-01-12

## 2024-01-18 ENCOUNTER — NON-APPOINTMENT (OUTPATIENT)
Age: 74
End: 2024-01-18

## 2024-01-18 ENCOUNTER — APPOINTMENT (OUTPATIENT)
Dept: ELECTROPHYSIOLOGY | Facility: CLINIC | Age: 74
End: 2024-01-18
Payer: MEDICARE

## 2024-01-18 VITALS
DIASTOLIC BLOOD PRESSURE: 85 MMHG | OXYGEN SATURATION: 98 % | HEIGHT: 73 IN | SYSTOLIC BLOOD PRESSURE: 149 MMHG | HEART RATE: 66 BPM | WEIGHT: 241 LBS | RESPIRATION RATE: 16 BRPM | BODY MASS INDEX: 31.94 KG/M2

## 2024-01-18 DIAGNOSIS — I44.7 LEFT BUNDLE-BRANCH BLOCK, UNSPECIFIED: ICD-10-CM

## 2024-01-18 DIAGNOSIS — R55 SYNCOPE AND COLLAPSE: ICD-10-CM

## 2024-01-18 DIAGNOSIS — I25.10 ATHEROSCLEROTIC HEART DISEASE OF NATIVE CORONARY ARTERY W/OUT ANGINA PECTORIS: ICD-10-CM

## 2024-01-18 DIAGNOSIS — I25.2 OLD MYOCARDIAL INFARCTION: ICD-10-CM

## 2024-01-18 PROCEDURE — 99214 OFFICE O/P EST MOD 30 MIN: CPT

## 2024-01-18 PROCEDURE — 93000 ELECTROCARDIOGRAM COMPLETE: CPT

## 2024-01-21 NOTE — HISTORY OF PRESENT ILLNESS
[FreeTextEntry1] : 74 y/o  *NSTEMI-s/p PCI OM1 Feb '22, s/p PCI RCA May '20 *prior suspected ASA allergy May '20, ASA challenge neg Mar '22. *AS-moderate *HTN  discussed DAPT for 2.5 yrs after last stent. Reviewed MCOT results - intermittent wide complex noted no symptoms no arrhythmias.  no new cardiac symptoms: chest pain, dyspnea.  ECG NSR old infarct no ischemia LBBB no longer present.  -------------------------------------- PRIOR CARDIAC TESTING: *EKG: NSR, LAE, Old inferior MI. *Echo Dec-5-'23: EF 55-60% mod LAE mild lVH moderate AS HAL 1.4 cm2 mild AR MV repair *Echo: 5/2020, LAE, MV repair with +1 MR, no pericardial effusion., normal LV function LVEF 55-60%.   *MPI Dec-5-'23: normal perfusion EF 65% *Cath Feb '22: "JAH to ostial OM1 w/ TIMI3...maintain on ticagrelor, 180 load in lab then 90 BID w/ cilostazol 100 BID (failed ASA desensitization)." LM diffuse disease, LAD diffuse disease, LCx OM1 99% stenosis RCA mid 60% RCA distal 50% (RCA stent noted) *Cardiac Cath: 5/2020, 1 Vessel CAD with inferior hypokinesis. Stent: 5/2020, JAH to RCA --------------------------------------

## 2024-02-02 ENCOUNTER — APPOINTMENT (OUTPATIENT)
Dept: UROLOGY | Facility: CLINIC | Age: 74
End: 2024-02-02

## 2024-02-05 PROBLEM — R55 SYNCOPE, UNSPECIFIED SYNCOPE TYPE: Status: ACTIVE | Noted: 2024-02-05

## 2024-02-05 PROBLEM — I44.7 LBBB (LEFT BUNDLE BRANCH BLOCK): Status: ACTIVE | Noted: 2024-02-05

## 2024-02-05 NOTE — ASSESSMENT
[FreeTextEntry1] : This is a 73-year-old gentleman with coronary disease, prior CVA chronic left bundle branch block who was hospitalized with an episode of syncope.  He presents for follow-up today after a event monitor.  He was asymptomatic during the period of time of monitoring and no significant arrhythmias were detected.  At this time recommended hydration avoidance of alcohol and no further EP evaluation.

## 2024-02-05 NOTE — CARDIOLOGY SUMMARY
[de-identified] : 1/18/24 : Sinus PVC 72 bpm First degree AVB LBBB LAE [de-identified] : 1/17/24 :  Event Monitor  [id number] HR  bpm Avg. 71 bpm First degree AVB, 7 beats NSVT/AIVR 104 bpm possibly aberrant conduction.  [de-identified] : 12/06/23 :  Normal SPECT Myocardial Perfusion Imaging. No evidence of reversible perfusion defects/no evidence of impaired perfusion reserve. Normal left ventricular contractility with an ejection fraction of 65% (Normal: 50% or greater). No regional wall motion abnormalities. [de-identified] : 12/5/23 : TTE Estimated left ventricular ejection fraction is 55-60 %.  The left ventricle is normal in size, wall motion and contractility as  seen in limited views.  Mild concentric left ventricular hypertrophy is present.  The left atrium is moderately dilated.  The aortic valve is well visualized, appears moderately calcified. Valve  opening seems to be restricted.  Moderate aortic stenosis is present.  Calculated HAL is 1.4 cm2.  Peak and mean transaortic gradients are 40 and 26 mmHg respectively.  Mild (1+) aortic regurgitation is present.  Mitral valve repair is present and intact. The Mean mitral gradient of 7  mmHg appears elevated.  Mild mitral annular calcification is present.  Mild (1+) mitral regurgitation is present.  EA reversal of the mitral inflow consistent with reduced compliance of the  left ventricle.  Normal appearing tricuspid valve structure.  Mild (1+) tricuspid valve regurgitation is present.  Normal appearing pulmonic valve structure.  Mild pulmonic valvular regurgitation (1+) is present. [de-identified] : 2/7/23 : Carotid CONCLUSIONS:1.Bilateral proximal ICA 20-49% stenosis.2.Vertebral: antegrade flow bilaterally

## 2024-02-05 NOTE — HISTORY OF PRESENT ILLNESS
[FreeTextEntry1] : Mr. Kramer is a 73-year-old man with coronary disease, valvular heart disease who was admitted on December 5, 2023 with an unwitnessed episode of syncope.  He consumed several alcoholic beverages prior to the event he reports occasional episodes of lightheadedness.  His EKG demonstrated a left bundle branch block evaluation demonstrated negative troponins and no significant arrhythmia on telemetry. He was discharged with an event recorder. He presents without recurrence of syncope, KASSY KRAMER denies chest pain, chest pressure, shortness of breath, lightheadedness, dizziness, palpitations, syncope, presyncope, orthopnea, PND, or edema.

## 2024-02-16 ENCOUNTER — APPOINTMENT (OUTPATIENT)
Dept: INTERNAL MEDICINE | Facility: CLINIC | Age: 74
End: 2024-02-16
Payer: MEDICARE

## 2024-02-16 VITALS
TEMPERATURE: 97.6 F | BODY MASS INDEX: 32.07 KG/M2 | WEIGHT: 242 LBS | OXYGEN SATURATION: 97 % | SYSTOLIC BLOOD PRESSURE: 138 MMHG | DIASTOLIC BLOOD PRESSURE: 78 MMHG | HEIGHT: 73 IN | HEART RATE: 69 BPM

## 2024-02-16 DIAGNOSIS — E11.9 TYPE 2 DIABETES MELLITUS W/OUT COMPLICATIONS: ICD-10-CM

## 2024-02-16 DIAGNOSIS — M17.12 UNILATERAL PRIMARY OSTEOARTHRITIS, LEFT KNEE: ICD-10-CM

## 2024-02-16 DIAGNOSIS — I77.810 THORACIC AORTIC ECTASIA: ICD-10-CM

## 2024-02-16 DIAGNOSIS — I10 ESSENTIAL (PRIMARY) HYPERTENSION: ICD-10-CM

## 2024-02-16 DIAGNOSIS — G47.33 OBSTRUCTIVE SLEEP APNEA (ADULT) (PEDIATRIC): ICD-10-CM

## 2024-02-16 DIAGNOSIS — I70.219 ATHEROSCLEROSIS OF NATIVE ARTERIES OF EXTREMITIES WITH INTERMITTENT CLAUDICATION, UNSPECIFIED EXTREMITY: ICD-10-CM

## 2024-02-16 PROCEDURE — 99214 OFFICE O/P EST MOD 30 MIN: CPT

## 2024-02-16 NOTE — HISTORY OF PRESENT ILLNESS
[FreeTextEntry1] :  Follow Up Visit [de-identified] : KASSY KRAMER is a 73 year M who comes in for a follow up visit. PT with hx of DM-2, NSTEMI s/p PCI OM1, RCA, HTN, AS-moderate, HLD, BPH, Depression, hypokalemia, angioedema, left knee end stage OA., BARRERA on CPAP. Patient recently admitted to the hospital from December for December 7 with syncopal event at home.  Patient did admit to having 3 alcoholic drinks an hour prior to the event and denies any symptoms prior to losing consciousness and after waking felt left shoulder pain with no shortness of breath chest pain dizziness or palpitations.  Patient did state he was having from cough in the last 3 weeks took antibiotics inhalers without much improvement but was not coughing prior to his syncopal event.  EKG done in the ER showed new left bundle branch block with normal troponin.  Patient seen by cardio and EP stress test negative and echo stable and advised to follow-up with MCOT upon discharge. Patient did see cardiology upon discharge and had MCOT done which showed intermittent wide complexes but no other symptoms or arrhythmias advised to continue current meds.  He was also seen by EP and advised to continue current meds and to keep hydrated and avoid EtOH use. Patient has questions regarding diagnosis of depression and notes he has been on fluoxetine 40 mg for many years.  He notes that his wife sometimes states that he feels depressed however he himself does not feel that he is more less depressed.  He is unsure if fluoxetine has helped with his overall symptoms. Patient denies any cp, sob, abdominal pain, nausea, vomiting, palpitations, fever, chills, constipation, diarrhea.

## 2024-02-16 NOTE — ASSESSMENT
[FreeTextEntry1] : 1.depression: Discussed diagnosis and treatment plan in great depth, he will continue on fluoxetine 40 mg but will advised patient to follow-up with mental health as he is considering switching to another medication. Counseling provided to the patient.  2.diabetes mellitus type 2: Patient unsure if metformin helping, advised that current hemoglobin A1c is well-controlled at 6.4, continue on metformin 100 mg twice daily, given referral to weight management as patient would like to consider switching to Ozempic. Pt advised to keep diabetic diet, decrease carbs and increase dietary protein intake. Exercise as tolerated 3-4 times a week.  3.new left bundle branch block: With syncopal event.  Patient seen by cardiology and EP with testing done and will continue on all current cardiac medications.  4.health maintenance: Patient states that due to his history of diabetes he has form from his job at [employer] where he is a , advised patient to drop off form to see if any further workup is needed to fill this out.  5.obstructive sleep apnea: On CPAP with pulmonary sleep medicine Dr. Yordan Dailey.

## 2024-02-17 ENCOUNTER — NON-APPOINTMENT (OUTPATIENT)
Age: 74
End: 2024-02-17

## 2024-02-21 NOTE — ED PROVIDER NOTE - EMPLOYMENT
INTERNAL MEDICINE RESIDENCY PROGRESS NOTE     Name: Gino Hernandez   Age & Sex: 83 y.o. male   MRN: [id number]  [facility]   Encounter: [id number]  Team: SOD Team C     PATIENT INFORMATION     Name: Gino Hernandez   Age & Sex: 83 y.o. male   MRN: [id number]  Hospital Stay Days: 14    ASSESSMENT/PLAN     Principal Problem:    Dementia with agitation and other behavioral disturbance (HCC)  Active Problems:    BPH (benign prostatic hyperplasia)    Polypharmacy    Moderate protein-calorie malnutrition (HCC)      * Dementia with agitation and other behavioral disturbance (HCC)  Assessment & Plan  Patient with history of progressive Alzheimer's dementia.  At baseline, patient may be alert and oriented to person, but not to place or time.  He holds conversations but does get confused at baseline.  Patient had recent admission in January 2024 patient has dementia with agitation and other behavioral disturbances.  Was seen in consultation by geriatrics for polypharmacy and dementia treatment at that time.  Following conclusion of hospitalization patient was discharged to hospice at [facility].  However, patient returned to ED at [facility] on 02/07 as patient was having behaviors and acting aggressive towards staff and other residents at [facility].  There were no longer able to care for him without a continuous one-to-one observer, which was not covered by insurance in which family could not afford.  Because of this, he was taken back to the ED to find placement at another facility.  Patient currently without any acute complaints or signs/symptoms of infection.  Per wife, patient has not been acting any different recently than he has since last discharge.  No apparent metabolic/infectious changes to explain behaviors at this time.  Likely just progression of underlying dementia.   Reviewed medicine reconciliation sent by [facility] at admission.  2/13: Pt has not  been combative. Occasionally pulls at mcduffie/IV, so reordered soft restraints overnight. Otherwise feels well.   2/14: Overnight was agitated. S/p 1 time dose of gabapentin 100mg. Improved. Nurse removed wrist restraints this am. Pt sleeping comfortably. Aox1  2/19: Stable today. No overnight events.   Plan:  Continue PTA Depakote 250 mg 3 times daily and cont extra 125 mg dose with PM dose.   Continue PTA melatonin 6 mg nightly  Continue PTA mirtazapine 15 mg nightly  Continue olanzapine 7.5 mg p.o. daily at 2 PM  Increased Abilify to 5 mg daily per geriatric recommendation  Continue PTA vitamin B-12 supplementation  Continue PTA olanzapine 2.5 mg IM as needed for agitation  Continue PTA haloperidol 1 mg p.o. every 6 hours as needed for agitation unresponsive to as needed olanzapine  Geriatrics service consulted at this admission given patient's dementia with behavioral disturbances and polypharmacy.  Their recommendations are very much appreciated.  PT/OT will continue to see and work on promoting ambulation  Avoid physical restraints, use anxiolytics prn for agitation  Increase gabapentin 200mg in am, 200mg in afternoon; increase night dose to 400mg      BPH (benign prostatic hyperplasia)  Assessment & Plan  Patient with history of BPH.  Voiding trial initiated while inpatient upon request of wife. Patient failed voiding trial, Mcduffie catheter placed 2/11.  Patient will require chronic Mcduffie catheter. Outpatient follow-up with urology.  *Spoke with wife and son at bedside. Wife is concerned about pt having discomfort with the mcduffie catheter. Had discussion regarding voiding trial and decided if pt is comfortable with the mcduffie, we can leave it for now. If it begins to cause discomfort, we can attempt a voiding trial. Informed nurse.  2/15: Pt still pulling at his mcduffie, attempted voiding trial, mcduffie removed  2/16: pt underwent straight cath with 900 cc output   2/17: pt doing well without mcduffie for now  2/18: pt  was straight cathed twice; bladder scans showed 427cc and 593 cc  : mcduffie was replaced     Plan:   Continue prior to admission finasteride 5 mg daily  Continue mcduffie     Polypharmacy  Assessment & Plan  Patient with history of progressive dementia.  Prior to admission medications include Depakote, melatonin, mirtazapine, olanzapine, quetiapine, Haldol as needed, olanzapine as needed.  These medications were reviewed at admission.  Medications currently being ministered at [facility] were continued.  Given patient's continued behavioral disturbances and extensive list of current medications, geriatric service is consulted and their recommendations are greatly appreciated.  Plan:  -as above    Constipation-resolved as of 2024  Assessment & Plan  Patient with history of constipation. Had several bowel movements overnight.   Plan:  Decrease bowel regimen to Miralax qd and sennakot qd  Monitor for bowel movements.          Disposition: Discharge to [facility] at 10am tomorrow    SUBJECTIVE     Patient seen and examined. No acute events overnight. Patient was found eating French toast with help from the PCA.  Answers some questions appropriately.  Follows some commands.  Denies pain, nausea, vomiting, shortness of breath, or dizziness.     OBJECTIVE     Vitals:    24 0839 24 1536 24 2225 24 0921   BP: 113/58 102/57 115/51 117/76   Pulse:       Resp: 16 16 14 16   Temp: (!) 97.1 °F (36.2 °C) (!) 96.1 °F (35.6 °C) (!) 97.3 °F (36.3 °C)    TempSrc:       SpO2:       Weight:       Height:          Temperature:   Temp (24hrs), Av.7 °F (35.9 °C), Min:96.1 °F (35.6 °C), Max:97.3 °F (36.3 °C)    Temperature: (!) 97.3 °F (36.3 °C)  Intake & Output:  I/O          07 0700  0701   0700  07 0700    P.O. 580 200 120    Total Intake(mL/kg) 580 (10.8) 200 (3.7) 120 (2.2)    Urine (mL/kg/hr) 1400 (1.1) 650 (0.5)     Total Output 1400 650     Net -820 -606  +120                 Weights:   IBW (Ideal Body Weight): 63.8 kg    Body mass index is 19.11 kg/m².  Weight (last 2 days)       Date/Time Weight    02/19/24 0600 53.7 (118.39)          Physical Exam  Constitutional:       Appearance: Normal appearance.   HENT:      Head: Normocephalic and atraumatic.   Eyes:      Extraocular Movements: Extraocular movements intact.   Cardiovascular:      Rate and Rhythm: Normal rate and regular rhythm.      Pulses: Normal pulses.      Heart sounds: Normal heart sounds. No murmur heard.  Pulmonary:      Effort: Pulmonary effort is normal.      Breath sounds: Normal breath sounds.   Abdominal:      General: Abdomen is flat. There is no distension.      Palpations: Abdomen is soft.      Tenderness: There is no abdominal tenderness.   Musculoskeletal:         General: No swelling or tenderness. Normal range of motion.      Cervical back: Normal range of motion and neck supple.   Skin:     General: Skin is warm and dry.   Neurological:      General: No focal deficit present.      Mental Status: He is alert.      Comments: AOx1   Psychiatric:         Mood and Affect: Mood normal.         Behavior: Behavior normal.       LABORATORY DATA     Labs: I have personally reviewed pertinent reports.  Results from last 7 days   Lab Units 02/17/24  0651 02/15/24  0841   WBC Thousand/uL 3.85* 4.84   HEMOGLOBIN g/dL 12.5 13.1   HEMATOCRIT % 37.2 39.5   PLATELETS Thousands/uL 169 178   NEUTROS PCT % 64  --    MONOS PCT % 9  --    EOS PCT % 3  --       Results from last 7 days   Lab Units 02/17/24  0651 02/15/24  0841   POTASSIUM mmol/L 3.6 3.7   CHLORIDE mmol/L 108 107   CO2 mmol/L 28 28   BUN mg/dL 23 25   CREATININE mg/dL 0.78 0.93   CALCIUM mg/dL 8.9 8.9                            IMAGING & DIAGNOSTIC TESTING     Radiology Results: I have personally reviewed pertinent reports.  No results found.  Other Diagnostic Testing: I have personally reviewed pertinent reports.    ACTIVE MEDICATIONS  "    Current Facility-Administered Medications   Medication Dose Route Frequency    acetaminophen (TYLENOL) tablet 650 mg  650 mg Oral Q6H PRN    ARIPiprazole (ABILIFY) tablet 5 mg  5 mg Oral Daily    cyanocobalamin (VITAMIN B-12) tablet 1,000 mcg  1,000 mcg Oral Daily    divalproex sodium (DEPAKOTE SPRINKLE) capsule 125 mg  125 mg Oral Daily    divalproex sodium (DEPAKOTE SPRINKLE) capsule 250 mg  250 mg Oral TID With Meals    enoxaparin (LOVENOX) subcutaneous injection 40 mg  40 mg Subcutaneous Daily    finasteride (PROSCAR) tablet 5 mg  5 mg Oral Daily    gabapentin (NEURONTIN) capsule 200 mg  200 mg Oral After Lunch    gabapentin (NEURONTIN) capsule 200 mg  200 mg Oral Daily    gabapentin (NEURONTIN) capsule 400 mg  400 mg Oral HS    haloperidol (HALDOL) tablet 1 mg  1 mg Oral Q6H PRN    melatonin tablet 6 mg  6 mg Oral HS    mirtazapine (REMERON) tablet 15 mg  15 mg Oral HS    OLANZapine (ZyPREXA) IM injection 2.5 mg  2.5 mg Intramuscular Daily PRN    OLANZapine (ZyPREXA) tablet 7.5 mg  7.5 mg Oral Daily    polyethylene glycol (MIRALAX) packet 17 g  17 g Oral Daily    senna-docusate sodium (SENOKOT S) 8.6-50 mg per tablet 1 tablet  1 tablet Oral Daily       VTE Pharmacologic Prophylaxis: Enoxaparin (Lovenox)  VTE Mechanical Prophylaxis: sequential compression device    Portions of the record may have been created with voice recognition software.  Occasional wrong word or \"sound a like\" substitutions may have occurred due to the inherent limitations of voice recognition software.  Read the chart carefully and recognize, using context, where substitutions have occurred.  ==  Lizbet Beckett DO  [facility]  Internal Medicine Residency PGY-1      " N/A

## 2024-02-22 ENCOUNTER — APPOINTMENT (OUTPATIENT)
Dept: UROLOGY | Facility: CLINIC | Age: 74
End: 2024-02-22
Payer: MEDICARE

## 2024-02-22 VITALS
DIASTOLIC BLOOD PRESSURE: 85 MMHG | HEART RATE: 69 BPM | HEIGHT: 73 IN | SYSTOLIC BLOOD PRESSURE: 146 MMHG | WEIGHT: 243 LBS | BODY MASS INDEX: 32.2 KG/M2

## 2024-02-22 DIAGNOSIS — N52.9 MALE ERECTILE DYSFUNCTION, UNSPECIFIED: ICD-10-CM

## 2024-02-22 DIAGNOSIS — Z87.442 PERSONAL HISTORY OF URINARY CALCULI: ICD-10-CM

## 2024-02-22 PROCEDURE — 99214 OFFICE O/P EST MOD 30 MIN: CPT

## 2024-02-22 NOTE — HISTORY OF PRESENT ILLNESS
[FreeTextEntry1] : history of kidney stone. no family history, 1st episode  status post ESWL and ureteroscopy for obsin 2020 with Dr Landaverde   Erectile dysfunction, Sildenafil not much response.   On Finasteride and Allo    Non smoker   Reports reasonable stream, urinates 3 to 4 x or so during the day and nocturia of  3 to 4 x.  1 week ago slow stream urgency more, urinary incontinence,  Has sense of incomplete emptying and post void dribbling.  Denies hesitancy, straining, intermittency, urgency, incontinence.  Denies dysuria, hematuria, lower abdominal or flank pain, fever, chills or rigors. Rates erections 2/5. Able to attain, maintain and penetrate.  low libido and decrease ejaculation.  Feels fatigued, tired and lethargic.  No family history of Prostate cancer.

## 2024-02-22 NOTE — ASSESSMENT
[FreeTextEntry1] : Will get Urinalysis and Urine culture  Renal and Bladder Ultrasound  Will get Total and Free Testosterone  Allo stop ? Continue Finasteride  Cardiology for Tadalafil. [facility] [place]   Return to office in 2 weeks or sooner if any issues: will do uroflow and check post void residual.

## 2024-02-24 LAB
APPEARANCE: ABNORMAL
BACTERIA: NEGATIVE /HPF
BILIRUBIN URINE: NEGATIVE
BLOOD URINE: NEGATIVE
CAST: 0 /LPF
COLOR: YELLOW
EPITHELIAL CELLS: 0 /HPF
GLUCOSE QUALITATIVE U: NEGATIVE MG/DL
KETONES URINE: NEGATIVE MG/DL
LEUKOCYTE ESTERASE URINE: NEGATIVE
MICROSCOPIC-UA: NORMAL
NITRITE URINE: NEGATIVE
PH URINE: 7
PROTEIN URINE: NEGATIVE MG/DL
RED BLOOD CELLS URINE: 0 /HPF
SPECIFIC GRAVITY URINE: 1.02
UROBILINOGEN URINE: 0.2 MG/DL
WHITE BLOOD CELLS URINE: 0 /HPF

## 2024-02-26 LAB — BACTERIA UR CULT: NORMAL

## 2024-02-27 ENCOUNTER — APPOINTMENT (OUTPATIENT)
Dept: ULTRASOUND IMAGING | Facility: CLINIC | Age: 74
End: 2024-02-27

## 2024-02-28 ENCOUNTER — OUTPATIENT (OUTPATIENT)
Dept: OUTPATIENT SERVICES | Facility: HOSPITAL | Age: 74
LOS: 1 days | End: 2024-02-28
Payer: MEDICARE

## 2024-02-28 ENCOUNTER — APPOINTMENT (OUTPATIENT)
Dept: ULTRASOUND IMAGING | Facility: CLINIC | Age: 74
End: 2024-02-28
Payer: MEDICARE

## 2024-02-28 DIAGNOSIS — Z41.9 ENCOUNTER FOR PROCEDURE FOR PURPOSES OTHER THAN REMEDYING HEALTH STATE, UNSPECIFIED: Chronic | ICD-10-CM

## 2024-02-28 DIAGNOSIS — Z96.643 PRESENCE OF ARTIFICIAL HIP JOINT, BILATERAL: Chronic | ICD-10-CM

## 2024-02-28 DIAGNOSIS — Z98.890 OTHER SPECIFIED POSTPROCEDURAL STATES: Chronic | ICD-10-CM

## 2024-02-28 DIAGNOSIS — Z87.442 PERSONAL HISTORY OF URINARY CALCULI: ICD-10-CM

## 2024-02-28 DIAGNOSIS — N40.1 BENIGN PROSTATIC HYPERPLASIA WITH LOWER URINARY TRACT SYMPTOMS: ICD-10-CM

## 2024-02-28 PROCEDURE — 76770 US EXAM ABDO BACK WALL COMP: CPT | Mod: 26

## 2024-02-28 PROCEDURE — 76770 US EXAM ABDO BACK WALL COMP: CPT

## 2024-03-08 ENCOUNTER — APPOINTMENT (OUTPATIENT)
Dept: UROLOGY | Facility: CLINIC | Age: 74
End: 2024-03-08
Payer: MEDICARE

## 2024-03-08 DIAGNOSIS — N40.1 BENIGN PROSTATIC HYPERPLASIA WITH LOWER URINARY TRACT SYMPMS: ICD-10-CM

## 2024-03-08 DIAGNOSIS — N13.8 BENIGN PROSTATIC HYPERPLASIA WITH LOWER URINARY TRACT SYMPMS: ICD-10-CM

## 2024-03-08 DIAGNOSIS — N28.1 CYST OF KIDNEY, ACQUIRED: ICD-10-CM

## 2024-03-08 PROCEDURE — 99214 OFFICE O/P EST MOD 30 MIN: CPT

## 2024-03-08 RX ORDER — TAMSULOSIN HYDROCHLORIDE 0.4 MG/1
0.4 CAPSULE ORAL
Qty: 90 | Refills: 1 | Status: ACTIVE | COMMUNITY
Start: 2024-03-08 | End: 1900-01-01

## 2024-03-12 PROBLEM — N28.1 RENAL CYST: Status: ACTIVE | Noted: 2024-03-12

## 2024-03-12 NOTE — HISTORY OF PRESENT ILLNESS
[FreeTextEntry1] : 73-year-old male presence for follow up. Did do labs and ultrasound. Stopped taking Finasteride. Mentions feeling better. Otherwise, same urination.  Seen on 2/22/2024 for Erectile dysfunction.  Rated erections 2/5. Had difficulty to attain, maintain and penetrate.  With Sildenafil not much response.  Had seen Dr Simmons and had had Penile duplex study. Has been told options are Intracavernosal injections or Inflatable Penile Prosthesis.  Also, complained of low libido and decreased ejaculation.  Felt fatigued, tired and lethargic.  Took Finasteride. 1 week ago, had slow stream, more urgency and urinary incontinence. Now back to baseline.  Reported reasonable stream, urinates 3 to 4 x or so during the day and nocturia of 3 to 4 x.  Had sense of incomplete emptying and post void dribbling.  Denied hesitancy, straining, intermittency, urgency, incontinence.  Denied dysuria, hematuria, lower abdominal or flank pain, fever, chills or rigors.  Has history of kidney stone, was 1st episode. On Allopurinol. No family history. Status post ESWL and ureteroscopy in 2020 with Dr Brian.  No family history of Prostate cancer.  Nonsmoker.

## 2024-03-12 NOTE — PHYSICAL EXAM
[Normal Appearance] : normal appearance [] : no respiratory distress [General Appearance - In No Acute Distress] : no acute distress [Normal Station and Gait] : the gait and station were normal for the patient's age [Oriented To Time, Place, And Person] : oriented to person, place, and time

## 2024-03-12 NOTE — ASSESSMENT
[FreeTextEntry1] : Reviewed records. Discussed labs and imaging. 2/26/24: Total testosterone: 308  Free testosterone: 41.6  A renal and bladder ultrasound was done on 2/28/24: 2 cm left simple renal cyst. Prostate volume 52 cc.  Post-void result 48 ml.  Renal cyst: Discussed that Renal cysts are a common finding on routine radiological studies. Autopsy studies in patients over the age of 50 reveal greater than a 50% chance of having at least one simple renal cyst. And that renal cysts may be classified as simple or complex depending how they look on imaging. Discussed complexity of renal cysts and its implications as regards malignancy.   Benign Prostatic Hyperplasia: Discussed treatment options. Will try Flomax. Discussed possible side effects. Will stay off Finasteride.  Erectile dysfunction: Discussed treatment options. Will try Tadalafil 20 mg every other day. Recommended take half tablet first. Discussed possible side effects.  Return to office in three months or sooner if any issues: will do uroflow and check post-void residual.

## 2024-03-12 NOTE — ADDENDUM
[FreeTextEntry1] :  Documented by Ewa Salvador acting as a scribe under Dr. Dawson Barker. 03/08/2024

## 2024-03-12 NOTE — END OF VISIT
[FreeTextEntry3] : Documented by Ewa Salvador acting as a scribe for Dr. Dawson Barker. 03/08/2024   All medical record entries made by the Scribe were at my, Dr. Dawson Barker, direction and personally dictated by me on 03/08/2024. I have reviewed the chart and agree that the record accurately reflects my personal performance of the history, physical exam, assessment and plan. I have also personally directed, reviewed, and agreed with the chart. [Time Spent: ___ minutes] : I have spent [unfilled] minutes of time on the encounter.

## 2024-03-18 ENCOUNTER — RX RENEWAL (OUTPATIENT)
Age: 74
End: 2024-03-18

## 2024-03-18 RX ORDER — CARVEDILOL 25 MG/1
25 TABLET, FILM COATED ORAL
Qty: 135 | Refills: 3 | Status: ACTIVE | COMMUNITY
Start: 2021-08-27 | End: 1900-01-01

## 2024-04-05 DIAGNOSIS — N52.9 MALE ERECTILE DYSFUNCTION, UNSPECIFIED: ICD-10-CM

## 2024-04-05 RX ORDER — SILDENAFIL 100 MG/1
100 TABLET, FILM COATED ORAL
Qty: 45 | Refills: 0 | Status: ACTIVE | COMMUNITY
Start: 2024-04-05 | End: 1900-01-01

## 2024-04-16 DIAGNOSIS — I10 ESSENTIAL (PRIMARY) HYPERTENSION: ICD-10-CM

## 2024-05-01 LAB
HCT VFR BLD CALC: 43.1 %
HGB BLD-MCNC: 14.3 G/DL
MCHC RBC-ENTMCNC: 30.9 PG
MCHC RBC-ENTMCNC: 33.2 GM/DL
MCV RBC AUTO: 93.1 FL
PLATELET # BLD AUTO: 180 K/UL
RBC # BLD: 4.63 M/UL
RBC # FLD: 13.2 %
WBC # FLD AUTO: 8.29 K/UL

## 2024-05-01 RX ORDER — METFORMIN HYDROCHLORIDE 500 MG/1
500 TABLET, COATED ORAL
Qty: 180 | Refills: 1 | Status: ACTIVE | COMMUNITY
Start: 2023-07-11 | End: 1900-01-01

## 2024-05-02 LAB
ALBUMIN SERPL ELPH-MCNC: 4.5 G/DL
ALP BLD-CCNC: 65 U/L
ALT SERPL-CCNC: 24 U/L
ANION GAP SERPL CALC-SCNC: 12 MMOL/L
AST SERPL-CCNC: 21 U/L
BILIRUB SERPL-MCNC: 1.2 MG/DL
BUN SERPL-MCNC: 23 MG/DL
CALCIUM SERPL-MCNC: 9.4 MG/DL
CHLORIDE SERPL-SCNC: 100 MMOL/L
CHOLEST SERPL-MCNC: 124 MG/DL
CO2 SERPL-SCNC: 29 MMOL/L
CREAT SERPL-MCNC: 0.98 MG/DL
EGFR: 82 ML/MIN/1.73M2
GLUCOSE SERPL-MCNC: 124 MG/DL
HDLC SERPL-MCNC: 52 MG/DL
LDLC SERPL CALC-MCNC: 49 MG/DL
NONHDLC SERPL-MCNC: 72 MG/DL
POTASSIUM SERPL-SCNC: 3.5 MMOL/L
PROT SERPL-MCNC: 7 G/DL
SODIUM SERPL-SCNC: 140 MMOL/L
TRIGL SERPL-MCNC: 132 MG/DL

## 2024-05-07 ENCOUNTER — APPOINTMENT (OUTPATIENT)
Dept: CARDIOLOGY | Facility: CLINIC | Age: 74
End: 2024-05-07
Payer: MEDICARE

## 2024-05-07 ENCOUNTER — NON-APPOINTMENT (OUTPATIENT)
Age: 74
End: 2024-05-07

## 2024-05-07 VITALS
OXYGEN SATURATION: 96 % | SYSTOLIC BLOOD PRESSURE: 126 MMHG | DIASTOLIC BLOOD PRESSURE: 80 MMHG | HEIGHT: 73 IN | BODY MASS INDEX: 33 KG/M2 | HEART RATE: 72 BPM | WEIGHT: 249 LBS

## 2024-05-07 PROCEDURE — 99214 OFFICE O/P EST MOD 30 MIN: CPT

## 2024-05-07 PROCEDURE — 93000 ELECTROCARDIOGRAM COMPLETE: CPT

## 2024-05-07 PROCEDURE — G2211 COMPLEX E/M VISIT ADD ON: CPT

## 2024-05-07 RX ORDER — TADALAFIL 20 MG/1
20 TABLET ORAL
Qty: 45 | Refills: 1 | Status: DISCONTINUED | COMMUNITY
Start: 2024-03-08 | End: 2024-05-07

## 2024-05-07 NOTE — HISTORY OF PRESENT ILLNESS
[FreeTextEntry1] : 72 y/o  *NSTEMI-s/p PCI OM1 Feb '22, s/p PCI RCA May '20 *prior suspected ASA allergy May '20, ASA challenge neg Mar '22. *AS-moderate *HTN  since last visit saw EP , [facility] reviewed no arrhythmias  no followup reccomended.  labs reviewed:  LDL slightly higher  discussed direct LDL with next lipid check.  Reviewed meds: ASA mg daily plavix 75 mg daily coreg 25 mg tabs 1/2 tab & 1 in evening BID (1/2 dose in AM b/c pt tired during day) chlorthal. 25 daily, zetia 10 mg daily, crestor 40 qhs  on questioning pt states he was taking chlorthal 1/2 tab in AM & 1 in PM also states he is taking metformin once a day instead of twice a day. Discussed reading print on RX vials.  -------------------------------------- PRIOR CARDIAC TESTING: *EKG: NSR, LAE, Old inferior MI. *Echo Dec-5-'23: EF 55-60% mod LAE mild lVH moderate AS HAL 1.4 cm2 mild AR MV repair *Echo: 5/2020, LAE, MV repair with +1 MR, no pericardial effusion., normal LV function LVEF 55-60%.   *MPI Dec-5-'23: normal perfusion EF 65% *Cath Feb '22: "JAH to ostial OM1 w/ TIMI3...maintain on ticagrelor, 180 load in lab then 90 BID w/ cilostazol 100 BID (failed ASA desensitization)." LM diffuse disease, LAD diffuse disease, LCx OM1 99% stenosis RCA mid 60% RCA distal 50% (RCA stent noted) *Cardiac Cath: 5/2020, 1 Vessel CAD with inferior hypokinesis. Stent: 5/2020, JAH to RCA --------------------------------------

## 2024-05-07 NOTE — ASSESSMENT
[FreeTextEntry1] :  A/P  Cont. current meds Return 4 months. consider direct LDL measurement w/ next lipid panel given slight increase in LDL values c/w prior level.

## 2024-05-30 ENCOUNTER — RX RENEWAL (OUTPATIENT)
Age: 74
End: 2024-05-30

## 2024-05-30 RX ORDER — CHLORTHALIDONE 25 MG/1
25 TABLET ORAL
Qty: 90 | Refills: 3 | Status: ACTIVE | COMMUNITY
Start: 2022-04-05 | End: 1900-01-01

## 2024-06-17 ENCOUNTER — RX RENEWAL (OUTPATIENT)
Age: 74
End: 2024-06-17

## 2024-06-17 RX ORDER — POTASSIUM CHLORIDE 1500 MG/1
20 TABLET, FILM COATED, EXTENDED RELEASE ORAL
Qty: 90 | Refills: 0 | Status: ACTIVE | COMMUNITY
Start: 2022-12-21 | End: 1900-01-01

## 2024-07-12 ENCOUNTER — APPOINTMENT (OUTPATIENT)
Dept: UROLOGY | Facility: CLINIC | Age: 74
End: 2024-07-12

## 2024-07-31 ENCOUNTER — RX RENEWAL (OUTPATIENT)
Age: 74
End: 2024-07-31

## 2024-08-02 ENCOUNTER — APPOINTMENT (OUTPATIENT)
Dept: UROLOGY | Facility: CLINIC | Age: 74
End: 2024-08-02

## 2024-08-02 VITALS
SYSTOLIC BLOOD PRESSURE: 130 MMHG | BODY MASS INDEX: 33 KG/M2 | WEIGHT: 249 LBS | DIASTOLIC BLOOD PRESSURE: 74 MMHG | HEIGHT: 73 IN

## 2024-08-02 DIAGNOSIS — L64.9 ANDROGENIC ALOPECIA, UNSPECIFIED: ICD-10-CM

## 2024-08-02 PROCEDURE — 99214 OFFICE O/P EST MOD 30 MIN: CPT

## 2024-08-02 RX ORDER — FINASTERIDE 1 MG/1
1 TABLET ORAL
Qty: 90 | Refills: 1 | Status: ACTIVE | COMMUNITY
Start: 2024-08-02 | End: 1900-01-01

## 2024-08-02 NOTE — END OF VISIT
[Time Spent: ___ minutes] : I have spent [unfilled] minutes of time on the encounter. [FreeTextEntry3] : Documented by Ewa Salvador acting as a scribe for Dr. Dawson Barker. 03/08/2024   All medical record entries made by the Scribe were at my, Dr. Dawson Barker, direction and personally dictated by me on 03/08/2024. I have reviewed the chart and agree that the record accurately reflects my personal performance of the history, physical exam, assessment and plan. I have also personally directed, reviewed, and agreed with the chart.

## 2024-08-02 NOTE — ASSESSMENT
[FreeTextEntry1] :  08/02/2024: Patient will continue Flomax.  Will start Finasteride 1 mg for hair loss.   Reviewed records. Discussed labs and imaging. 2/26/24: Total testosterone: 308  Free testosterone: 41.6  A renal and bladder ultrasound was done on 2/28/24: 2 cm left simple renal cyst. Prostate volume 52 cc.  Post-void result 48 ml.  Renal cyst: Discussed that Renal cysts are a common finding on routine radiological studies. Autopsy studies in patients over the age of 50 reveal greater than a 50% chance of having at least one simple renal cyst. And that renal cysts may be classified as simple or complex depending how they look on imaging. Discussed complexity of renal cysts and its implications as regards malignancy.   Benign Prostatic Hyperplasia: Discussed treatment options. Will try Flomax. Discussed possible side effects. Will stay off Finasteride.  Erectile dysfunction: Discussed treatment options. Will try Tadalafil 20 mg every other day. Recommended take half tablet first. Discussed possible side effects. Discussed treatment options including penile prosthesis.   Patient will continue Tadalafil 20 mg every other day and use penile ring.     Return to office in 6 months or sooner if there are any issues. Will do uroflow and check PVR.

## 2024-08-02 NOTE — ADDENDUM
[FreeTextEntry1] :  Glendy DESIR assisted in documentation on 08/02/2024  acting as a scribe for Dr. Dawson Barker.

## 2024-08-02 NOTE — HISTORY OF PRESENT ILLNESS
[FreeTextEntry1] : 08/02/2024: 74-year-old male presents for follow-up.  Taking Flomax, has reasonable stream. Daytime frequency 3-4 times and nocturia 2 times.  No longer has urgency or urinary incontinence.  Had stopped taking Finasteride sometime in February 2024.   Has not noticed any major difference in urination. Feels that urination is better.   Complaining of hair loss. Would like to restart Finasteride 1 mg.  Taking Tadalafil 20 mg every other day. Does not feel much difference with erectile dysfunction.   Mentions that Dr. Dill said he has a possible venous leak.   73-year-old male presence for follow up. Did do labs and ultrasound. Stopped taking Finasteride. Mentions feeling better. Otherwise, same urination.  Seen on 2/22/2024 for Erectile dysfunction.  Rated erections 2/5. Had difficulty to attain, maintain and penetrate.  With Sildenafil not much response.  Had seen Dr Simmons and had had Penile duplex study. Has been told options are Intracavernosal injections or Inflatable Penile Prosthesis.  Also, complained of low libido and decreased ejaculation.  Felt fatigued, tired and lethargic.  Took Finasteride. 1 week ago, had slow stream, more urgency and urinary incontinence. Now back to baseline.  Reported reasonable stream, urinates 3 to 4 x or so during the day and nocturia of 3 to 4 x.  Had sense of incomplete emptying and post void dribbling.  Denied hesitancy, straining, intermittency, urgency, incontinence.  Denied dysuria, hematuria, lower abdominal or flank pain, fever, chills or rigors.  Has history of kidney stone, was 1st episode. On Allopurinol. No family history. Status post ESWL and ureteroscopy in 2020 with Dr Brian.  No family history of Prostate cancer.  Nonsmoker.

## 2024-08-16 ENCOUNTER — APPOINTMENT (OUTPATIENT)
Dept: INTERNAL MEDICINE | Facility: CLINIC | Age: 74
End: 2024-08-16
Payer: MEDICARE

## 2024-08-16 VITALS
BODY MASS INDEX: 32.07 KG/M2 | WEIGHT: 242 LBS | DIASTOLIC BLOOD PRESSURE: 80 MMHG | SYSTOLIC BLOOD PRESSURE: 124 MMHG | TEMPERATURE: 97.9 F | HEART RATE: 61 BPM | HEIGHT: 73 IN | OXYGEN SATURATION: 97 %

## 2024-08-16 DIAGNOSIS — I70.219 ATHEROSCLEROSIS OF NATIVE ARTERIES OF EXTREMITIES WITH INTERMITTENT CLAUDICATION, UNSPECIFIED EXTREMITY: ICD-10-CM

## 2024-08-16 DIAGNOSIS — I10 ESSENTIAL (PRIMARY) HYPERTENSION: ICD-10-CM

## 2024-08-16 DIAGNOSIS — R42 DIZZINESS AND GIDDINESS: ICD-10-CM

## 2024-08-16 DIAGNOSIS — R19.5 OTHER FECAL ABNORMALITIES: ICD-10-CM

## 2024-08-16 PROCEDURE — G2211 COMPLEX E/M VISIT ADD ON: CPT

## 2024-08-16 PROCEDURE — 99214 OFFICE O/P EST MOD 30 MIN: CPT

## 2024-08-16 PROCEDURE — 36415 COLL VENOUS BLD VENIPUNCTURE: CPT

## 2024-08-18 LAB
ALBUMIN SERPL ELPH-MCNC: 4.6 G/DL
ALP BLD-CCNC: 65 U/L
ALT SERPL-CCNC: 18 U/L
ANION GAP SERPL CALC-SCNC: 17 MMOL/L
AST SERPL-CCNC: 20 U/L
BASOPHILS # BLD AUTO: 0.03 K/UL
BASOPHILS NFR BLD AUTO: 0.4 %
BILIRUB SERPL-MCNC: 1.2 MG/DL
BUN SERPL-MCNC: 24 MG/DL
CALCIUM SERPL-MCNC: 9.8 MG/DL
CHLORIDE SERPL-SCNC: 97 MMOL/L
CO2 SERPL-SCNC: 25 MMOL/L
CREAT SERPL-MCNC: 0.99 MG/DL
EGFR: 80 ML/MIN/1.73M2
EOSINOPHIL # BLD AUTO: 0.17 K/UL
EOSINOPHIL NFR BLD AUTO: 2.1 %
ESTIMATED AVERAGE GLUCOSE: 134 MG/DL
FERRITIN SERPL-MCNC: 107 NG/ML
GLUCOSE SERPL-MCNC: 123 MG/DL
HBA1C MFR BLD HPLC: 6.3 %
HCT VFR BLD CALC: 43.7 %
HGB BLD-MCNC: 14.4 G/DL
IMM GRANULOCYTES NFR BLD AUTO: 0.1 %
IRON SATN MFR SERPL: 44 %
IRON SERPL-MCNC: 134 UG/DL
LYMPHOCYTES # BLD AUTO: 1.91 K/UL
LYMPHOCYTES NFR BLD AUTO: 23.8 %
MAN DIFF?: NORMAL
MCHC RBC-ENTMCNC: 30.7 PG
MCHC RBC-ENTMCNC: 33 GM/DL
MCV RBC AUTO: 93.2 FL
MONOCYTES # BLD AUTO: 0.88 K/UL
MONOCYTES NFR BLD AUTO: 11 %
NEUTROPHILS # BLD AUTO: 5.01 K/UL
NEUTROPHILS NFR BLD AUTO: 62.6 %
PLATELET # BLD AUTO: 175 K/UL
POTASSIUM SERPL-SCNC: 3.7 MMOL/L
PROT SERPL-MCNC: 7.1 G/DL
RBC # BLD: 4.69 M/UL
RBC # FLD: 13.1 %
SODIUM SERPL-SCNC: 140 MMOL/L
TIBC SERPL-MCNC: 304 UG/DL
UIBC SERPL-MCNC: 169 UG/DL
VIT B12 SERPL-MCNC: 309 PG/ML
WBC # FLD AUTO: 8.01 K/UL

## 2024-08-18 NOTE — HISTORY OF PRESENT ILLNESS
[FreeTextEntry1] :  Follow Up Visit [de-identified] : KASSY KRAMER is a 74 year M who comes in for a follow up visit. PT with hx of DM-2, NSTEMI s/p PCI OM1, RCA, HTN, AS-moderate, HLD, BPH, Depression, hypokalemia, angioedema, left knee end stage OA., BARRERA on CPAP. Pt notes feeling light headed recently, and more tired. He has also had "darker bm" for about 2-3 weeks, no black or bloody stool. He is not on iron supplement.  Patient denies any cp, sob, abdominal pain, nausea, vomiting, palpitations, fever, chills, constipation, diarrhea.

## 2024-08-18 NOTE — ASSESSMENT
[FreeTextEntry1] : 1.Dark stool: obtain stool FOBT, advise referral to Gastro at this time for colonoscopy, check cbc, iron/tibc, ferritin level.   2.diabetes mellitus type 2: recheck hba1c, continue on metformin 100 mg twice daily. Pt advised to keep diabetic diet, decrease carbs and increase dietary protein intake. Exercise as tolerated 3-4 times a week.  3.new left bundle branch block: with light headedness, Discussed blood work to obtain. f/u with cardiology and EP.   4.health maintenance: Discussed blood work to obtain. form completed and returned to patient.   5.Depression: continue on fluoxetine 40 mg daily. Counseling provided to the patient.

## 2024-08-18 NOTE — HISTORY OF PRESENT ILLNESS
[FreeTextEntry1] :  Follow Up Visit [de-identified] : KASSY KRAMER is a 74 year M who comes in for a follow up visit. PT with hx of DM-2, NSTEMI s/p PCI OM1, RCA, HTN, AS-moderate, HLD, BPH, Depression, hypokalemia, angioedema, left knee end stage OA., BARRERA on CPAP. Pt notes feeling light headed recently, and more tired. He has also had "darker bm" for about 2-3 weeks, no black or bloody stool. He is not on iron supplement.  Patient denies any cp, sob, abdominal pain, nausea, vomiting, palpitations, fever, chills, constipation, diarrhea.

## 2024-08-19 ENCOUNTER — NON-APPOINTMENT (OUTPATIENT)
Age: 74
End: 2024-08-19

## 2024-08-22 ENCOUNTER — APPOINTMENT (OUTPATIENT)
Dept: BARIATRICS | Facility: CLINIC | Age: 74
End: 2024-08-22
Payer: MEDICARE

## 2024-08-22 VITALS
DIASTOLIC BLOOD PRESSURE: 83 MMHG | SYSTOLIC BLOOD PRESSURE: 156 MMHG | HEART RATE: 65 BPM | HEIGHT: 67 IN | TEMPERATURE: 97.3 F | WEIGHT: 247 LBS | OXYGEN SATURATION: 97 % | BODY MASS INDEX: 38.77 KG/M2

## 2024-08-22 DIAGNOSIS — E56.9 VITAMIN DEFICIENCY, UNSPECIFIED: ICD-10-CM

## 2024-08-22 DIAGNOSIS — R79.9 ABNORMAL FINDING OF BLOOD CHEMISTRY, UNSPECIFIED: ICD-10-CM

## 2024-08-22 DIAGNOSIS — I25.2 OLD MYOCARDIAL INFARCTION: ICD-10-CM

## 2024-08-22 DIAGNOSIS — E66.01 MORBID (SEVERE) OBESITY DUE TO EXCESS CALORIES: ICD-10-CM

## 2024-08-22 DIAGNOSIS — Z01.812 ENCOUNTER FOR PREPROCEDURAL LABORATORY EXAMINATION: ICD-10-CM

## 2024-08-22 DIAGNOSIS — Z00.00 ENCOUNTER FOR GENERAL ADULT MEDICAL EXAMINATION W/OUT ABNORMAL FINDINGS: ICD-10-CM

## 2024-08-22 DIAGNOSIS — R63.2 POLYPHAGIA: ICD-10-CM

## 2024-08-22 DIAGNOSIS — E61.8 DEFICIENCY OF OTHER SPECIFIED NUTRIENT ELEMENTS: ICD-10-CM

## 2024-08-22 DIAGNOSIS — R63.5 ABNORMAL WEIGHT GAIN: ICD-10-CM

## 2024-08-22 DIAGNOSIS — E78.5 HYPERLIPIDEMIA, UNSPECIFIED: ICD-10-CM

## 2024-08-22 DIAGNOSIS — R63.8 OTHER SYMPTOMS AND SIGNS CONCERNING FOOD AND FLUID INTAKE: ICD-10-CM

## 2024-08-22 DIAGNOSIS — E11.9 TYPE 2 DIABETES MELLITUS W/OUT COMPLICATIONS: ICD-10-CM

## 2024-08-22 DIAGNOSIS — Z86.39 PERSONAL HISTORY OF OTHER ENDOCRINE, NUTRITIONAL AND METABOLIC DISEASE: ICD-10-CM

## 2024-08-22 DIAGNOSIS — E46 UNSPECIFIED PROTEIN-CALORIE MALNUTRITION: ICD-10-CM

## 2024-08-22 PROCEDURE — G2211 COMPLEX E/M VISIT ADD ON: CPT

## 2024-08-22 PROCEDURE — 99205 OFFICE O/P NEW HI 60 MIN: CPT

## 2024-08-22 NOTE — PHYSICAL EXAM
[Obese, well nourished, in no acute distress] : obese, well nourished, in no acute distress [Normal] : affect appropriate [de-identified] : Anicteric, no conjunctival injection or drainage noted [de-identified] : Supple, no obvious palpable masses or lymphadenopathy noted [de-identified] : Nonlabored respirations, equal chest rise, no audible wheezing [de-identified] : Regular rate and rhythm [de-identified] : Soft, obese, nontender.  [de-identified] : No obvious deformity, normal gait

## 2024-08-22 NOTE — ASSESSMENT
[FreeTextEntry1] : KASSY KRAMER is a 74 year-old M with a long-standing h/o obesity, who presents today for initial evaluation for weight management options.    Had a lengthy discussion with the patient regarding nutrition, exercise, weight loss medications, and bariatric surgery. The patient qualifies for and is most interested in weight loss medications.   Health maintenance and behavioral/nutrition counseling for obesity: An additional 30 minutes of the visit was spent counseling the patient regarding need for aggressive weight loss and behavior modification including nutrition and proper eating habits, including which foods to eat and avoid.   Emphasized the importance of making healthier food choices including fresh fruits and vegetables, lean meats, and protein sources. Recommended front loading calories, incorporating whole grains, and eliminating fast foods. I also discussed the importance of avoiding fried/fatty foods and foods containing high sugar content including juices/shakes/sodas/desserts.   Also encouraged beginning an exercise program and recommended cardiovascular exercises along with strength training to build lean muscle. Made suggestions on different types of exercises to try.   Patient will work on the following:   -Eliminate snacks   -Focus on eating 3 well-balanced meals during the daytime with appropriate portion size   -Cooking fresh meals rather than take out/processed/ready-made foods   -Incorporating exercise (aim for 150 mins/week with both cardio- and strength-training)    Start Mounjaro based on authorization and labs. All risks and benefits have been discussed with the patient. Pt verbalizes understanding and wishes to proceed with medical therapy. Instructions for use provided. Pt understands the need for long-term use and understands the risk of weight-gain upon stopping weight loss medications.   Patient educated to call with questions/concerns  All questions answered  Return to office 3 weeks after starting Mounjaro; call the office right away with any side effects   Additional time spent before and after visit reviewing chart.

## 2024-08-22 NOTE — HISTORY OF PRESENT ILLNESS
[de-identified] : KASSY KRAMER is a 74 year old M with a long-standing Hx of obesity presents today for initial evaluation for weight management options.   Reports no history of substance abuse, significant anxiety, uncontrolled blood pressure, glaucoma, or pancreatitis. Reports no family history of thyroid cancer or MEN 2 syndrome. Personal history of kidney stones Reports no significant nausea/vomiting, abdominal pain, diarrhea on a regular basis. Reports constipation which he is taking MOM for   Heaviest/current wt 245 lbs, lowest wt 218 lbs. Goal weight: 200lbs  Diets/exercise programs tried in the past: yes  Weight loss medications tried in the past: no  Reason for stopping weight loss medication if applicable: n/a     History of bariatric surgery: no  Obesity comorbidities: HTN, DM, HLD, heart disease (NSTEMI s/p PCI in 2022)  Comorbidities improved/resolved: n/a  Anti-obesity medication: none  Obesity medication side effects: n/a     Current dietary lifestyle:  Breakfast: coffee, toast  Lunch: leftovers - chicken, pasta   Dinner: pasta, chicken salad, carbs  Snacks: peanuts, potato chips, pretzel  Drinks: water    Activity Lifestyle:  Physical activity/exercise: stationary bike 30 mins 2-3 times a week  Work: retired  Smoking/ETOH: social ETOH  Health screenings: Last Colonoscopy: 2022 Last PSA Check: previously checked

## 2024-08-29 ENCOUNTER — RX RENEWAL (OUTPATIENT)
Age: 74
End: 2024-08-29

## 2024-08-29 RX ORDER — POTASSIUM CHLORIDE 1500 MG/1
20 TABLET, EXTENDED RELEASE ORAL
Qty: 90 | Refills: 0 | Status: ACTIVE | COMMUNITY
Start: 2024-08-29 | End: 1900-01-01

## 2024-09-10 ENCOUNTER — APPOINTMENT (OUTPATIENT)
Dept: CARDIOLOGY | Facility: CLINIC | Age: 74
End: 2024-09-10
Payer: MEDICARE

## 2024-09-10 ENCOUNTER — NON-APPOINTMENT (OUTPATIENT)
Age: 74
End: 2024-09-10

## 2024-09-10 VITALS
OXYGEN SATURATION: 96 % | DIASTOLIC BLOOD PRESSURE: 74 MMHG | HEIGHT: 72 IN | HEART RATE: 66 BPM | SYSTOLIC BLOOD PRESSURE: 124 MMHG | WEIGHT: 243 LBS | BODY MASS INDEX: 32.91 KG/M2

## 2024-09-10 PROCEDURE — G2211 COMPLEX E/M VISIT ADD ON: CPT

## 2024-09-10 PROCEDURE — 99214 OFFICE O/P EST MOD 30 MIN: CPT

## 2024-09-10 PROCEDURE — 93000 ELECTROCARDIOGRAM COMPLETE: CPT

## 2024-09-11 NOTE — HISTORY OF PRESENT ILLNESS
[FreeTextEntry1] : [id number]  KASSY KRAMER  May 30 1950 [phone]   72 y/o  *NSTEMI-s/p PCI OM1 Feb '22, s/p PCI RCA May '20 *prior suspected ASA allergy May '20, ASA challenge neg Mar '22. *AS-moderate *HTN *DM, obesity-BMI=33  since last visit reduced chlorthal. reduced from 25 to 12.5 daily last week b/c felt tired. also noticed lightheadness w/ standing feels better less lightheaded & less tired.  Waiting for insurance to approve mounjaro. no chest pain, dyspnea, palpitation/syncope/lightheadness. Long discussion re: weight loss clinic visit & weight loss strategies.  Sinus rhythm LBBB.  -------------------------------------- PRIOR CARDIAC TESTING: *EKG: NSR, LAE, Old inferior MI. *Echo Dec-5-'23: EF 55-60% mod LAE mild lVH moderate AS HAL 1.4 cm2 mild AR MV repair *Echo: 5/2020, LAE, MV repair with +1 MR, no pericardial effusion., normal LV function LVEF 55-60%.  *MPI Dec-5-'23: normal perfusion EF 65% *Cath Feb '22: "JAH to ostial OM1 w/ TIMI3...maintain on ticagrelor, 180 load in lab then 90 BID w/ cilostazol 100 BID (failed ASA desensitization)." LM diffuse disease, LAD diffuse disease, LCx OM1 99% stenosis RCA mid 60% RCA distal 50% (RCA stent noted) *Cardiac Cath: 5/2020, 1 Vessel CAD with inferior hypokinesis. Stent: 5/2020, JAH to RCA --------------------------------------

## 2024-09-11 NOTE — HISTORY OF PRESENT ILLNESS
[FreeTextEntry1] : [id number]  KASSY KRAMER  May 30 1950 [phone]   72 y/o  *NSTEMI-s/p PCI OM1 Feb '22, s/p PCI RCA May '20 *prior suspected ASA allergy May '20, ASA challenge neg Mar '22. *AS-moderate *HTN *DM, obesity-BMI=33  since last visit reduced chlorthal. reduced from 25 to 12.5 daily last week b/c felt tired. also noticed lightheadness w/ standing feels better less lightheaded & less tired.  Waiting for insurance to approve mounjaro. no chest pain, dyspnea, palpitation/syncope/lightheadness. Long discussion re: weight loss clinic visit & weight loss strategies.  Sinus rhythm LBBB.  -------------------------------------- PRIOR CARDIAC TESTING: *EKG: NSR, LAE, Old inferior MI. *Echo Dec-5-'23: EF 55-60% mod LAE mild lVH moderate AS HAL 1.4 cm2 mild AR MV repair *Echo: 5/2020, LAE, MV repair with +1 MR, no pericardial effusion., normal LV function LVEF 55-60%.  *MPI Dec-5-'23: normal perfusion EF 65% *Cath Feb '22: "JAH to ostial OM1 w/ TIMI3...maintain on ticagrelor, 180 load in lab then 90 BID w/ cilostazol 100 BID (failed ASA desensitization)." LM diffuse disease, LAD diffuse disease, LCx OM1 99% stenosis RCA mid 60% RCA distal 50% (RCA stent noted) *Cardiac Cath: 5/2020, 1 Vessel CAD with inferior hypokinesis. Stent: 5/2020, JAH to RCA --------------------------------------

## 2024-09-18 LAB
ALBUMIN SERPL ELPH-MCNC: 4.2 G/DL
ALP BLD-CCNC: 64 U/L
ALT SERPL-CCNC: 16 U/L
ANION GAP SERPL CALC-SCNC: 13 MMOL/L
AST SERPL-CCNC: 17 U/L
BILIRUB SERPL-MCNC: 1.4 MG/DL
BUN SERPL-MCNC: 26 MG/DL
CALCIUM SERPL-MCNC: 9.6 MG/DL
CHLORIDE SERPL-SCNC: 102 MMOL/L
CHOLEST SERPL-MCNC: 104 MG/DL
CO2 SERPL-SCNC: 25 MMOL/L
CREAT SERPL-MCNC: 0.93 MG/DL
EGFR: 87 ML/MIN/1.73M2
HDLC SERPL-MCNC: 40 MG/DL
LDLC SERPL CALC-MCNC: 42 MG/DL
LDLC SERPL DIRECT ASSAY-MCNC: 46 MG/DL
NONHDLC SERPL-MCNC: 64 MG/DL
POTASSIUM SERPL-SCNC: 4 MMOL/L
PROT SERPL-MCNC: 6.7 G/DL
SODIUM SERPL-SCNC: 140 MMOL/L
TRIGL SERPL-MCNC: 123 MG/DL

## 2024-09-24 ENCOUNTER — APPOINTMENT (OUTPATIENT)
Dept: BARIATRICS | Facility: CLINIC | Age: 74
End: 2024-09-24

## 2024-09-25 ENCOUNTER — APPOINTMENT (OUTPATIENT)
Dept: BARIATRICS | Facility: CLINIC | Age: 74
End: 2024-09-25
Payer: MEDICARE

## 2024-09-25 VITALS
HEART RATE: 72 BPM | SYSTOLIC BLOOD PRESSURE: 136 MMHG | DIASTOLIC BLOOD PRESSURE: 72 MMHG | HEIGHT: 72 IN | WEIGHT: 243.8 LBS | TEMPERATURE: 97.2 F | BODY MASS INDEX: 33.02 KG/M2 | OXYGEN SATURATION: 97 %

## 2024-09-25 DIAGNOSIS — E11.9 TYPE 2 DIABETES MELLITUS W/OUT COMPLICATIONS: ICD-10-CM

## 2024-09-25 DIAGNOSIS — Z79.899 OTHER LONG TERM (CURRENT) DRUG THERAPY: ICD-10-CM

## 2024-09-25 DIAGNOSIS — E66.09 OTHER OBESITY DUE TO EXCESS CALORIES: ICD-10-CM

## 2024-09-25 DIAGNOSIS — E46 UNSPECIFIED PROTEIN-CALORIE MALNUTRITION: ICD-10-CM

## 2024-09-25 PROCEDURE — 99213 OFFICE O/P EST LOW 20 MIN: CPT

## 2024-09-25 RX ORDER — FINASTERIDE 5 MG/1
5 TABLET, FILM COATED ORAL DAILY
Refills: 0 | Status: ACTIVE | COMMUNITY

## 2024-09-25 RX ORDER — TIRZEPATIDE 5 MG/.5ML
5 INJECTION, SOLUTION SUBCUTANEOUS
Qty: 1 | Refills: 1 | Status: ACTIVE | COMMUNITY
Start: 2024-09-19 | End: 1900-01-01

## 2024-09-25 RX ORDER — EZETIMIBE 10 MG/1
10 TABLET ORAL
Refills: 0 | Status: ACTIVE | COMMUNITY

## 2024-09-25 NOTE — HISTORY OF PRESENT ILLNESS
[de-identified] : KASSY KRAMER is a 74 year M with a long-standing h/o obesity, who presents today for follow up after starting anti-obesity medication- Mounjaro. Very happy with progress, weight loss since last visit 4lbs. Consuming 2-3 meals/day . Drinking 30-40oz water per day. Activities include pushup ups/weights, walking. No abdominal pain, reflux, nausea, vomiting, or diarrhea. Reports some constipation which he has had prior to starting Mounjaro   History of bariatric surgery: no Anti-obesity medication(s): Mounjaro Start date: 9/16/24 Current dose: 2.5mg Side-effects from anti-obesity medication(s): none, constipation which he has had prior   Starting weight at initial consult:  247lbs Current weight at today's visit: 243lbs Obesity comorbidities:  HTN, DM, HLD, heart disease (NSTEMI s/p PCI in 2022) Comorbidities improved/resolved: none

## 2024-09-25 NOTE — PHYSICAL EXAM
[Obese, well nourished, in no acute distress] : obese, well nourished, in no acute distress [Normal] : affect appropriate [de-identified] : Anicteric, no conjunctival injection or drainage noted [de-identified] : Supple, no obvious palpable masses or lymphadenopathy noted [de-identified] : Nonlabored respirations, equal chest rise, no audible wheezing [de-identified] : Regular rate and rhythm [de-identified] : Soft, obese, nontender.  [de-identified] : No obvious deformity, normal gait

## 2024-09-25 NOTE — ASSESSMENT
[FreeTextEntry1] : KASSY KRAMER is a 74 year old M with a long-standing h/o obesity, who presents today for follow up for weight management. Tolerating Mounjaro 2.5mg well without side effects  Reviewed guidelines about nutrition and snacking - protein focus diet with 3 meals/day Continue better food choices Daily zero calorie liquid intake goal of 64 oz/day Encouraged to participate in a daily exercise regimen incorporating cardio and strength training - reviewed strength training exercises with pt  Pt verbalizes understanding of the above  Return to office in 1 month Mounjaro dose increased to 5mg once patient completes 2.5mg doses Prescription sent to pt's pharmacy on file Follow up with nutritionist and nutrition classes All questions answered  Additional time spent before and after visit reviewing chart.

## 2024-10-15 ENCOUNTER — APPOINTMENT (OUTPATIENT)
Dept: BARIATRICS | Facility: CLINIC | Age: 74
End: 2024-10-15

## 2024-10-25 ENCOUNTER — NON-APPOINTMENT (OUTPATIENT)
Age: 74
End: 2024-10-25

## 2024-10-28 ENCOUNTER — APPOINTMENT (OUTPATIENT)
Dept: BARIATRICS | Facility: CLINIC | Age: 74
End: 2024-10-28
Payer: MEDICARE

## 2024-10-28 VITALS
DIASTOLIC BLOOD PRESSURE: 84 MMHG | HEIGHT: 72 IN | WEIGHT: 241.62 LBS | TEMPERATURE: 97.2 F | SYSTOLIC BLOOD PRESSURE: 128 MMHG | BODY MASS INDEX: 32.73 KG/M2 | OXYGEN SATURATION: 97 % | HEART RATE: 78 BPM

## 2024-10-28 DIAGNOSIS — R63.4 ABNORMAL WEIGHT LOSS: ICD-10-CM

## 2024-10-28 DIAGNOSIS — E66.9 OBESITY, UNSPECIFIED: ICD-10-CM

## 2024-10-28 DIAGNOSIS — E11.9 TYPE 2 DIABETES MELLITUS W/OUT COMPLICATIONS: ICD-10-CM

## 2024-10-28 DIAGNOSIS — I25.2 OLD MYOCARDIAL INFARCTION: ICD-10-CM

## 2024-10-28 DIAGNOSIS — E66.09 OBESITY, CLASS 1: ICD-10-CM

## 2024-10-28 DIAGNOSIS — E66.811 OBESITY, CLASS 1: ICD-10-CM

## 2024-10-28 PROCEDURE — 99213 OFFICE O/P EST LOW 20 MIN: CPT

## 2024-10-28 RX ORDER — TIRZEPATIDE 7.5 MG/.5ML
7.5 INJECTION, SOLUTION SUBCUTANEOUS
Qty: 4 | Refills: 1 | Status: ACTIVE | COMMUNITY
Start: 2024-10-28 | End: 1900-01-01

## 2024-10-28 RX ORDER — FINASTERIDE 5 MG/1
5 TABLET, FILM COATED ORAL DAILY
Refills: 0 | Status: ACTIVE | COMMUNITY

## 2024-10-28 RX ORDER — ALLOPURINOL 100 MG/1
100 TABLET ORAL DAILY
Refills: 0 | Status: ACTIVE | COMMUNITY

## 2024-10-31 ENCOUNTER — RX RENEWAL (OUTPATIENT)
Age: 74
End: 2024-10-31

## 2024-11-04 ENCOUNTER — RX RENEWAL (OUTPATIENT)
Age: 74
End: 2024-11-04

## 2024-11-05 NOTE — ASU PREOP CHECKLIST - RESPIRATORY RATE (BREATHS/MIN)
Continue Claritin 10 mg and Zyrtec 10 mg nightly for allergies     Continue Flonase 1 to 2 puffs to each nostril daily     Discussed anxiety, information given     Continue Lexapro 15 mg (1.5 tab) daily     Naproxen twice a day as needed (take with food and full glass of water)    No Aleve, Advil, Ibuprofen, Motrin or aspirin while taking naproxen.    Watch for GI symptoms (heart burn, indigestion, epigastric pain or blood in stool)    Can take Tylenol as needed/directed, not to exceed more than 3000 to 4000 mg in a 24-hour period    Stretches/exercises given.    Heat or ice, whichever feels better.    Encouraged rest, ice, compression and elevation     Follow up in 4-6 weeks, sooner if symptoms worsen or persist     Follow-up in 3-4 months, sooner if symptoms worsen or persist  
14

## 2024-11-11 ENCOUNTER — NON-APPOINTMENT (OUTPATIENT)
Age: 74
End: 2024-11-11

## 2024-11-11 ENCOUNTER — APPOINTMENT (OUTPATIENT)
Dept: FAMILY MEDICINE | Facility: CLINIC | Age: 74
End: 2024-11-11
Payer: MEDICARE

## 2024-11-11 VITALS
TEMPERATURE: 97.5 F | WEIGHT: 234 LBS | BODY MASS INDEX: 31.69 KG/M2 | HEIGHT: 72 IN | DIASTOLIC BLOOD PRESSURE: 84 MMHG | HEART RATE: 91 BPM | OXYGEN SATURATION: 95 % | SYSTOLIC BLOOD PRESSURE: 120 MMHG

## 2024-11-11 DIAGNOSIS — R19.7 DIARRHEA, UNSPECIFIED: ICD-10-CM

## 2024-11-11 PROCEDURE — 99204 OFFICE O/P NEW MOD 45 MIN: CPT

## 2024-11-15 ENCOUNTER — RX RENEWAL (OUTPATIENT)
Age: 74
End: 2024-11-15

## 2024-12-02 ENCOUNTER — APPOINTMENT (OUTPATIENT)
Dept: BARIATRICS | Facility: CLINIC | Age: 74
End: 2024-12-02
Payer: MEDICARE

## 2024-12-02 VITALS
BODY MASS INDEX: 32.51 KG/M2 | WEIGHT: 240 LBS | HEART RATE: 71 BPM | OXYGEN SATURATION: 97 % | HEIGHT: 72 IN | TEMPERATURE: 97.2 F

## 2024-12-02 DIAGNOSIS — E66.811 OBESITY, CLASS 1: ICD-10-CM

## 2024-12-02 DIAGNOSIS — R63.8 OTHER SYMPTOMS AND SIGNS CONCERNING FOOD AND FLUID INTAKE: ICD-10-CM

## 2024-12-02 DIAGNOSIS — Z01.812 ENCOUNTER FOR PREPROCEDURAL LABORATORY EXAMINATION: ICD-10-CM

## 2024-12-02 DIAGNOSIS — Z79.899 OTHER LONG TERM (CURRENT) DRUG THERAPY: ICD-10-CM

## 2024-12-02 DIAGNOSIS — Z86.39 PERSONAL HISTORY OF OTHER ENDOCRINE, NUTRITIONAL AND METABOLIC DISEASE: ICD-10-CM

## 2024-12-02 DIAGNOSIS — E46 UNSPECIFIED PROTEIN-CALORIE MALNUTRITION: ICD-10-CM

## 2024-12-02 DIAGNOSIS — R79.9 ABNORMAL FINDING OF BLOOD CHEMISTRY, UNSPECIFIED: ICD-10-CM

## 2024-12-02 DIAGNOSIS — Z00.00 ENCOUNTER FOR GENERAL ADULT MEDICAL EXAMINATION W/OUT ABNORMAL FINDINGS: ICD-10-CM

## 2024-12-02 DIAGNOSIS — E11.9 TYPE 2 DIABETES MELLITUS W/OUT COMPLICATIONS: ICD-10-CM

## 2024-12-02 DIAGNOSIS — E66.09 OBESITY, CLASS 1: ICD-10-CM

## 2024-12-02 DIAGNOSIS — E56.9 VITAMIN DEFICIENCY, UNSPECIFIED: ICD-10-CM

## 2024-12-02 DIAGNOSIS — E61.8 DEFICIENCY OF OTHER SPECIFIED NUTRIENT ELEMENTS: ICD-10-CM

## 2024-12-02 DIAGNOSIS — R63.2 POLYPHAGIA: ICD-10-CM

## 2024-12-02 DIAGNOSIS — R63.5 ABNORMAL WEIGHT GAIN: ICD-10-CM

## 2024-12-02 PROCEDURE — 99213 OFFICE O/P EST LOW 20 MIN: CPT

## 2024-12-02 RX ORDER — TIRZEPATIDE 7.5 MG/.5ML
7.5 INJECTION, SOLUTION SUBCUTANEOUS
Qty: 4 | Refills: 1 | Status: ACTIVE | COMMUNITY
Start: 2024-12-02 | End: 1900-01-01

## 2024-12-06 ENCOUNTER — RX RENEWAL (OUTPATIENT)
Age: 74
End: 2024-12-06

## 2024-12-11 ENCOUNTER — APPOINTMENT (OUTPATIENT)
Dept: GASTROENTEROLOGY | Facility: CLINIC | Age: 74
End: 2024-12-11

## 2025-01-07 ENCOUNTER — APPOINTMENT (OUTPATIENT)
Dept: CARDIOLOGY | Facility: CLINIC | Age: 75
End: 2025-01-07

## 2025-01-08 ENCOUNTER — APPOINTMENT (OUTPATIENT)
Dept: BARIATRICS | Facility: CLINIC | Age: 75
End: 2025-01-08
Payer: MEDICARE

## 2025-01-08 VITALS
SYSTOLIC BLOOD PRESSURE: 126 MMHG | OXYGEN SATURATION: 97 % | BODY MASS INDEX: 31.5 KG/M2 | HEART RATE: 77 BPM | TEMPERATURE: 97.2 F | WEIGHT: 232.58 LBS | DIASTOLIC BLOOD PRESSURE: 76 MMHG | HEIGHT: 72 IN

## 2025-01-08 DIAGNOSIS — E46 UNSPECIFIED PROTEIN-CALORIE MALNUTRITION: ICD-10-CM

## 2025-01-08 DIAGNOSIS — R79.9 ABNORMAL FINDING OF BLOOD CHEMISTRY, UNSPECIFIED: ICD-10-CM

## 2025-01-08 DIAGNOSIS — E61.8 DEFICIENCY OF OTHER SPECIFIED NUTRIENT ELEMENTS: ICD-10-CM

## 2025-01-08 DIAGNOSIS — Z86.39 PERSONAL HISTORY OF OTHER ENDOCRINE, NUTRITIONAL AND METABOLIC DISEASE: ICD-10-CM

## 2025-01-08 DIAGNOSIS — Z00.00 ENCOUNTER FOR GENERAL ADULT MEDICAL EXAMINATION W/OUT ABNORMAL FINDINGS: ICD-10-CM

## 2025-01-08 DIAGNOSIS — Z01.812 ENCOUNTER FOR PREPROCEDURAL LABORATORY EXAMINATION: ICD-10-CM

## 2025-01-08 DIAGNOSIS — R63.2 POLYPHAGIA: ICD-10-CM

## 2025-01-08 DIAGNOSIS — R63.4 ABNORMAL WEIGHT LOSS: ICD-10-CM

## 2025-01-08 DIAGNOSIS — E56.9 VITAMIN DEFICIENCY, UNSPECIFIED: ICD-10-CM

## 2025-01-08 DIAGNOSIS — Z79.899 OTHER LONG TERM (CURRENT) DRUG THERAPY: ICD-10-CM

## 2025-01-08 DIAGNOSIS — R63.5 ABNORMAL WEIGHT GAIN: ICD-10-CM

## 2025-01-08 DIAGNOSIS — R63.8 OTHER SYMPTOMS AND SIGNS CONCERNING FOOD AND FLUID INTAKE: ICD-10-CM

## 2025-01-08 DIAGNOSIS — E66.9 OBESITY, UNSPECIFIED: ICD-10-CM

## 2025-01-08 DIAGNOSIS — I25.2 OLD MYOCARDIAL INFARCTION: ICD-10-CM

## 2025-01-08 PROCEDURE — 99213 OFFICE O/P EST LOW 20 MIN: CPT

## 2025-01-10 ENCOUNTER — NON-APPOINTMENT (OUTPATIENT)
Age: 75
End: 2025-01-10

## 2025-01-10 ENCOUNTER — APPOINTMENT (OUTPATIENT)
Dept: CARDIOLOGY | Facility: CLINIC | Age: 75
End: 2025-01-10
Payer: MEDICARE

## 2025-01-10 VITALS
HEIGHT: 72 IN | SYSTOLIC BLOOD PRESSURE: 122 MMHG | DIASTOLIC BLOOD PRESSURE: 70 MMHG | HEART RATE: 77 BPM | OXYGEN SATURATION: 97 % | BODY MASS INDEX: 31.15 KG/M2 | WEIGHT: 230 LBS

## 2025-01-10 PROCEDURE — 99214 OFFICE O/P EST MOD 30 MIN: CPT

## 2025-01-10 PROCEDURE — G2211 COMPLEX E/M VISIT ADD ON: CPT

## 2025-01-10 PROCEDURE — 93000 ELECTROCARDIOGRAM COMPLETE: CPT

## 2025-01-15 NOTE — ED ADULT NURSE NOTE - PRIMARY CARE PROVIDER
Blood pressure is well controlled.   Diabetes is well controlled.     Medications refilled.   Labs are stable.     Congratulations on the weight loss! Keep up the hard work!      Will try change over to Stelara for your Psoriasis.... due to insurance preference.       Results for orders placed or performed in visit on 01/15/25   Urine Macroscopic with reflex to Microscopic     Status: Normal   Result Value Ref Range    Color Urine Yellow Colorless, Straw, Light Yellow, Yellow    Appearance Urine Clear Clear    Glucose Urine Negative Negative mg/dL    Bilirubin Urine Negative Negative    Ketones Urine Negative Negative mg/dL    Specific Gravity Urine 1.027 1.000 - 1.030    Blood Urine Negative Negative    pH Urine 5.0 5.0 - 9.0    Protein Albumin Urine Negative Negative mg/dL    Urobilinogen Urine Normal Normal, 2.0 mg/dL    Nitrite Urine Negative Negative    Leukocyte Esterase Urine Negative Negative    Narrative    Microscopic not indicated   TSH with free T4 reflex     Status: Normal   Result Value Ref Range    TSH 2.60 0.30 - 4.20 uIU/mL   Hemoglobin A1c     Status: Abnormal   Result Value Ref Range    Estimated Average Glucose 151 (H) <117 mg/dL    Hemoglobin A1C 6.9 (H) <5.7 %   CBC with platelets     Status: Normal   Result Value Ref Range    WBC Count 9.0 4.0 - 11.0 10e3/uL    RBC Count 5.21 4.40 - 5.90 10e6/uL    Hemoglobin 16.6 13.3 - 17.7 g/dL    Hematocrit 48.4 40.0 - 53.0 %    MCV 93 78 - 100 fL    MCH 31.9 26.5 - 33.0 pg    MCHC 34.3 31.5 - 36.5 g/dL    RDW 12.8 10.0 - 15.0 %    Platelet Count 193 150 - 450 10e3/uL   Albumin Random Urine Quantitative with Creat Ratio     Status: None   Result Value Ref Range    Creatinine Urine mg/dL 234.4 mg/dL    Albumin Urine mg/L 18.7 mg/L    Albumin Urine mg/g Cr 7.98 0.00 - 17.00 mg/g Cr   Lipid Profile     Status: None   Result Value Ref Range    Cholesterol 143 <200 mg/dL    Triglycerides 90 <150 mg/dL    Direct Measure HDL 52 >=40 mg/dL    LDL Cholesterol  Calculated 73 <100 mg/dL    Non HDL Cholesterol 91 <130 mg/dL    Patient Fasting > 8hrs? Yes     Narrative    Cholesterol  Desirable: < 200 mg/dL  Borderline High: 200 - 239 mg/dL  High: >= 240 mg/dL    Triglycerides  Normal: < 150 mg/dL  Borderline High: 150 - 199 mg/dL  High: 200-499 mg/dL  Very High: >= 500 mg/dL    Direct Measure HDL  Female: >= 50 mg/dL   Male: >= 40 mg/dL    LDL Cholesterol  Desirable: < 100 mg/dL  Above Desirable: 100 - 129 mg/dL   Borderline High: 130 - 159 mg/dL   High:  160 - 189 mg/dL   Very High: >= 190 mg/dL    Non HDL Cholesterol  Desirable: < 130 mg/dL  Above Desirable: 130 - 159 mg/dL  Borderline High: 160 - 189 mg/dL  High: 190 - 219 mg/dL  Very High: >= 220 mg/dL   Comprehensive metabolic panel     Status: Abnormal   Result Value Ref Range    Sodium 141 135 - 145 mmol/L    Potassium 3.9 3.4 - 5.3 mmol/L    Carbon Dioxide (CO2) 25 22 - 29 mmol/L    Anion Gap 12 7 - 15 mmol/L    Urea Nitrogen 16.5 8.0 - 23.0 mg/dL    Creatinine 0.95 0.67 - 1.17 mg/dL    GFR Estimate 86 >60 mL/min/1.73m2    Calcium 9.1 8.8 - 10.4 mg/dL    Chloride 104 98 - 107 mmol/L    Glucose 146 (H) 70 - 99 mg/dL    Alkaline Phosphatase 117 40 - 150 U/L    AST 40 0 - 45 U/L    ALT 62 0 - 70 U/L    Protein Total 6.9 6.4 - 8.3 g/dL    Albumin 4.1 3.5 - 5.2 g/dL    Bilirubin Total 0.5 <=1.2 mg/dL    Patient Fasting > 8hrs? Yes           Aspects of Diabetes:   Recent Labs   Lab Test 01/15/25  0724 10/07/24  0732 07/02/24  0657   A1C 6.9* 6.8* 7.4*   LDL 73 56 57   HDL 52 48 51   TRIG 90 85 85   ALT 62 68 55   CR 0.95 1.01 0.88   GFRESTIMATED 86 80 >90   POTASSIUM 3.9 3.8 3.7   TSH 2.60 3.47 2.52   WBC 9.0 7.2 7.2   HGB 16.6 15.8 15.4    186 186   ALBUMIN 4.1 4.1 4.1      Hemoglobin A1c  Goal range is under 8%. Best is 6.5 to 7   Blood Pressure 118/82 Goal to keep less than 140/90   Tobacco  reports that he quit smoking about 22 years ago. His smoking use included cigarettes. He has never used smokeless tobacco.  Goal to abstain from tobacco   Aspirin or Plavix Anti-platelet therapy Aspirin or Plavix reduces risk of heart disease and stroke  -- sometimes used with other blood thinners, depending on bleeding risk and risk factors.    ACE/ARB Specific blood pressure meds These medications can reduce risk of kidney disease   Cholesterol Statins (Lipitor, Crestor, vs others) Statins reduce risk of heart disease and stroke   Eye Exam -- Do Yearly -- Annual diabetic eye exam   Healthy weight Wt Readings from Last 4 Encounters:   01/15/25 108 kg (238 lb)   10/25/24 110.2 kg (243 lb)   10/22/24 110.3 kg (243 lb 3.2 oz)   10/07/24 110.6 kg (243 lb 12.8 oz)      Body mass index is 37.84 kg/m .  Goal BMI under 30, best is under 25.      -- Trying to exercise daily (goal at least 20 min/day) with moderate aerobic activity   -- Eat healthy (resources from ADA at http://www.diabetes.org/)   -- Taking good care of my feet. Consider seeing the Podiatrist   -- Check blood sugars as directed, record in log book and bring to every appointment    Insurance companies are grading you and I on your blood sugar control -- Goal is to get your A1c down to 7.9% or lower and NO Smoking!  -- Medicare and most insurance companies, will only cover Hemoglobin A1c labs to be rechecked every 91+ days.      Return for Diabetes labs and clinic follow-up appointment every 3 to 4 months.    Schedule lab only appointment --- A few days AFTER: 4/15/25   Schedule clinic appointment with Dr. Samuels -- Same day as labs, or 1-2 days later.     Angela

## 2025-01-27 ENCOUNTER — RX RENEWAL (OUTPATIENT)
Age: 75
End: 2025-01-27

## 2025-01-28 ENCOUNTER — RX RENEWAL (OUTPATIENT)
Age: 75
End: 2025-01-28

## 2025-01-28 ENCOUNTER — NON-APPOINTMENT (OUTPATIENT)
Age: 75
End: 2025-01-28

## 2025-02-07 ENCOUNTER — APPOINTMENT (OUTPATIENT)
Dept: UROLOGY | Facility: CLINIC | Age: 75
End: 2025-02-07

## 2025-02-26 ENCOUNTER — APPOINTMENT (OUTPATIENT)
Dept: INTERNAL MEDICINE | Facility: CLINIC | Age: 75
End: 2025-02-26
Payer: MEDICARE

## 2025-02-26 VITALS
TEMPERATURE: 97.3 F | OXYGEN SATURATION: 98 % | BODY MASS INDEX: 30.61 KG/M2 | DIASTOLIC BLOOD PRESSURE: 74 MMHG | HEART RATE: 80 BPM | HEIGHT: 72 IN | WEIGHT: 226 LBS | SYSTOLIC BLOOD PRESSURE: 122 MMHG

## 2025-02-26 DIAGNOSIS — I25.10 ATHEROSCLEROTIC HEART DISEASE OF NATIVE CORONARY ARTERY W/OUT ANGINA PECTORIS: ICD-10-CM

## 2025-02-26 DIAGNOSIS — I77.810 THORACIC AORTIC ECTASIA: ICD-10-CM

## 2025-02-26 DIAGNOSIS — N40.1 BENIGN PROSTATIC HYPERPLASIA WITH LOWER URINARY TRACT SYMPMS: ICD-10-CM

## 2025-02-26 DIAGNOSIS — N13.8 BENIGN PROSTATIC HYPERPLASIA WITH LOWER URINARY TRACT SYMPMS: ICD-10-CM

## 2025-02-26 DIAGNOSIS — E78.2 MIXED HYPERLIPIDEMIA: ICD-10-CM

## 2025-02-26 DIAGNOSIS — Z00.00 ENCOUNTER FOR GENERAL ADULT MEDICAL EXAMINATION W/OUT ABNORMAL FINDINGS: ICD-10-CM

## 2025-02-26 PROCEDURE — G0439: CPT

## 2025-02-26 RX ORDER — FINASTERIDE 5 MG/1
5 TABLET, FILM COATED ORAL
Refills: 0 | Status: ACTIVE | COMMUNITY

## 2025-02-26 RX ORDER — ALLOPURINOL 100 MG/1
100 TABLET ORAL
Refills: 0 | Status: ACTIVE | COMMUNITY

## 2025-03-04 ENCOUNTER — APPOINTMENT (OUTPATIENT)
Dept: BARIATRICS | Facility: CLINIC | Age: 75
End: 2025-03-04
Payer: MEDICARE

## 2025-03-04 VITALS
HEART RATE: 75 BPM | DIASTOLIC BLOOD PRESSURE: 83 MMHG | HEIGHT: 72 IN | BODY MASS INDEX: 30.82 KG/M2 | OXYGEN SATURATION: 95 % | TEMPERATURE: 97.3 F | SYSTOLIC BLOOD PRESSURE: 132 MMHG | WEIGHT: 227.51 LBS

## 2025-03-04 DIAGNOSIS — E11.9 TYPE 2 DIABETES MELLITUS W/OUT COMPLICATIONS: ICD-10-CM

## 2025-03-04 DIAGNOSIS — E66.9 OBESITY, UNSPECIFIED: ICD-10-CM

## 2025-03-04 DIAGNOSIS — Z79.899 OTHER LONG TERM (CURRENT) DRUG THERAPY: ICD-10-CM

## 2025-03-04 DIAGNOSIS — R63.4 ABNORMAL WEIGHT LOSS: ICD-10-CM

## 2025-03-04 PROCEDURE — 99213 OFFICE O/P EST LOW 20 MIN: CPT

## 2025-03-04 NOTE — ED ADULT NURSE NOTE - NSHOSCREENINGQ1_ED_ALL_ED
Spoke with patient and also his wife.  Several days ago he started having behavioral problems including body movements and talking to himself.  This even happened at work and his employer called his wife to come and get him.  Since Sunday he has not had these episodes.  He denies usage of drugs or alcohol.  He denies any unusual level of stress.  We will increase risperidone to 2 mg twice daily   No

## 2025-03-04 NOTE — PATIENT PROFILE ADULT - NSASFALLNEEDSASSIST_GEN_A_NUR
Notified by  yes

## 2025-03-05 LAB
ALBUMIN SERPL ELPH-MCNC: 4.5 G/DL
ALP BLD-CCNC: 64 U/L
ALT SERPL-CCNC: 21 U/L
ANION GAP SERPL CALC-SCNC: 16 MMOL/L
AST SERPL-CCNC: 22 U/L
BASOPHILS # BLD AUTO: 0.04 K/UL
BASOPHILS NFR BLD AUTO: 0.5 %
BILIRUB SERPL-MCNC: 1.5 MG/DL
BUN SERPL-MCNC: 23 MG/DL
CALCIUM SERPL-MCNC: 10.2 MG/DL
CHLORIDE SERPL-SCNC: 103 MMOL/L
CHOLEST SERPL-MCNC: 128 MG/DL
CO2 SERPL-SCNC: 24 MMOL/L
CREAT SERPL-MCNC: 0.95 MG/DL
EGFR: 85 ML/MIN/1.73M2
EOSINOPHIL # BLD AUTO: 0.2 K/UL
EOSINOPHIL NFR BLD AUTO: 2.5 %
GLUCOSE SERPL-MCNC: 124 MG/DL
HCT VFR BLD CALC: 46.3 %
HDLC SERPL-MCNC: 45 MG/DL
HGB BLD-MCNC: 15.4 G/DL
IMM GRANULOCYTES NFR BLD AUTO: 0.2 %
LDLC SERPL CALC-MCNC: 60 MG/DL
LYMPHOCYTES # BLD AUTO: 1.94 K/UL
LYMPHOCYTES NFR BLD AUTO: 23.9 %
MAN DIFF?: NORMAL
MCHC RBC-ENTMCNC: 30.1 PG
MCHC RBC-ENTMCNC: 33.3 G/DL
MCV RBC AUTO: 90.4 FL
MONOCYTES # BLD AUTO: 0.84 K/UL
MONOCYTES NFR BLD AUTO: 10.3 %
NEUTROPHILS # BLD AUTO: 5.09 K/UL
NEUTROPHILS NFR BLD AUTO: 62.6 %
NONHDLC SERPL-MCNC: 83 MG/DL
PLATELET # BLD AUTO: 215 K/UL
POTASSIUM SERPL-SCNC: 4.3 MMOL/L
PROT SERPL-MCNC: 7.3 G/DL
PSA SERPL-MCNC: 8.79 NG/ML
RBC # BLD: 5.12 M/UL
RBC # FLD: 13.3 %
SODIUM SERPL-SCNC: 143 MMOL/L
TRIGL SERPL-MCNC: 131 MG/DL
TSH SERPL-ACNC: 2.2 UIU/ML
WBC # FLD AUTO: 8.13 K/UL

## 2025-03-11 ENCOUNTER — NON-APPOINTMENT (OUTPATIENT)
Age: 75
End: 2025-03-11

## 2025-03-19 ENCOUNTER — APPOINTMENT (OUTPATIENT)
Dept: UROLOGY | Facility: CLINIC | Age: 75
End: 2025-03-19
Payer: MEDICARE

## 2025-03-19 DIAGNOSIS — L64.9 ANDROGENIC ALOPECIA, UNSPECIFIED: ICD-10-CM

## 2025-03-19 DIAGNOSIS — N52.9 MALE ERECTILE DYSFUNCTION, UNSPECIFIED: ICD-10-CM

## 2025-03-19 DIAGNOSIS — N40.1 BENIGN PROSTATIC HYPERPLASIA WITH LOWER URINARY TRACT SYMPMS: ICD-10-CM

## 2025-03-19 DIAGNOSIS — Z87.442 PERSONAL HISTORY OF URINARY CALCULI: ICD-10-CM

## 2025-03-19 DIAGNOSIS — R97.20 ELEVATED PROSTATE, SPECIFIC ANTIGEN [PSA]: ICD-10-CM

## 2025-03-19 DIAGNOSIS — N13.8 BENIGN PROSTATIC HYPERPLASIA WITH LOWER URINARY TRACT SYMPMS: ICD-10-CM

## 2025-03-19 PROCEDURE — 99214 OFFICE O/P EST MOD 30 MIN: CPT

## 2025-03-20 LAB
CALCIUM SERPL-MCNC: 9.4 MG/DL
PARATHYROID HORMONE INTACT: 22 PG/ML
PSA FREE FLD-MCNC: 24 %
PSA FREE SERPL-MCNC: 1.61 NG/ML
PSA SERPL-MCNC: 6.85 NG/ML
URATE SERPL-MCNC: 3.3 MG/DL

## 2025-03-26 PROBLEM — N52.9 ERECTILE DYSFUNCTION: Noted: 2021-06-16

## 2025-03-29 ENCOUNTER — OUTPATIENT (OUTPATIENT)
Dept: OUTPATIENT SERVICES | Facility: HOSPITAL | Age: 75
LOS: 1 days | End: 2025-03-29
Payer: MEDICARE

## 2025-03-29 ENCOUNTER — RESULT REVIEW (OUTPATIENT)
Age: 75
End: 2025-03-29

## 2025-03-29 ENCOUNTER — APPOINTMENT (OUTPATIENT)
Dept: MRI IMAGING | Facility: CLINIC | Age: 75
End: 2025-03-29

## 2025-03-29 DIAGNOSIS — Z96.643 PRESENCE OF ARTIFICIAL HIP JOINT, BILATERAL: Chronic | ICD-10-CM

## 2025-03-29 DIAGNOSIS — Z41.9 ENCOUNTER FOR PROCEDURE FOR PURPOSES OTHER THAN REMEDYING HEALTH STATE, UNSPECIFIED: Chronic | ICD-10-CM

## 2025-03-29 DIAGNOSIS — R97.20 ELEVATED PROSTATE SPECIFIC ANTIGEN [PSA]: ICD-10-CM

## 2025-03-29 DIAGNOSIS — Z98.890 OTHER SPECIFIED POSTPROCEDURAL STATES: Chronic | ICD-10-CM

## 2025-03-29 PROCEDURE — 76498 UNLISTED MR PROCEDURE: CPT

## 2025-03-29 PROCEDURE — A9585: CPT

## 2025-03-29 PROCEDURE — 72197 MRI PELVIS W/O & W/DYE: CPT

## 2025-03-29 PROCEDURE — 76498P: CUSTOM | Mod: 26

## 2025-03-29 PROCEDURE — 72197 MRI PELVIS W/O & W/DYE: CPT | Mod: 26

## 2025-03-31 NOTE — H&P ADULT - CONSTITUTIONAL
How Severe Are Your Spot(S)?: moderate What Is The Reason For Today's Visit?: Full Body Skin Examination with No Concerns What Is The Reason For Today's Visit? (Being Monitored For X): concerning skin lesions on an annual basis Well-developed, well nourished

## 2025-04-21 ENCOUNTER — APPOINTMENT (OUTPATIENT)
Dept: BARIATRICS | Facility: CLINIC | Age: 75
End: 2025-04-21
Payer: MEDICARE

## 2025-04-21 VITALS
TEMPERATURE: 98.2 F | OXYGEN SATURATION: 98 % | HEIGHT: 72 IN | WEIGHT: 229.72 LBS | HEART RATE: 78 BPM | DIASTOLIC BLOOD PRESSURE: 84 MMHG | BODY MASS INDEX: 31.11 KG/M2 | SYSTOLIC BLOOD PRESSURE: 130 MMHG

## 2025-04-21 DIAGNOSIS — Z79.899 OTHER LONG TERM (CURRENT) DRUG THERAPY: ICD-10-CM

## 2025-04-21 DIAGNOSIS — I25.2 OLD MYOCARDIAL INFARCTION: ICD-10-CM

## 2025-04-21 DIAGNOSIS — E66.9 OBESITY, UNSPECIFIED: ICD-10-CM

## 2025-04-21 DIAGNOSIS — E11.9 TYPE 2 DIABETES MELLITUS W/OUT COMPLICATIONS: ICD-10-CM

## 2025-04-21 DIAGNOSIS — E46 UNSPECIFIED PROTEIN-CALORIE MALNUTRITION: ICD-10-CM

## 2025-04-21 PROCEDURE — 99213 OFFICE O/P EST LOW 20 MIN: CPT

## 2025-04-21 PROCEDURE — G2211 COMPLEX E/M VISIT ADD ON: CPT

## 2025-04-21 RX ORDER — CARVEDILOL 25 MG/1
25 TABLET, FILM COATED ORAL AT BEDTIME
Refills: 0 | Status: ACTIVE | COMMUNITY

## 2025-04-21 RX ORDER — CARVEDILOL 12.5 MG/1
12.5 TABLET, FILM COATED ORAL DAILY
Refills: 0 | Status: ACTIVE | COMMUNITY

## 2025-04-21 RX ORDER — CARVEDILOL 25 MG/1
25 TABLET, FILM COATED ORAL
Refills: 0 | Status: ACTIVE | COMMUNITY

## 2025-05-21 ENCOUNTER — APPOINTMENT (OUTPATIENT)
Dept: BARIATRICS | Facility: CLINIC | Age: 75
End: 2025-05-21

## 2025-05-30 ENCOUNTER — APPOINTMENT (OUTPATIENT)
Dept: CARDIOLOGY | Facility: CLINIC | Age: 75
End: 2025-05-30

## 2025-06-11 ENCOUNTER — APPOINTMENT (OUTPATIENT)
Dept: UROLOGY | Facility: CLINIC | Age: 75
End: 2025-06-11

## 2025-06-16 ENCOUNTER — APPOINTMENT (OUTPATIENT)
Dept: UROLOGY | Facility: CLINIC | Age: 75
End: 2025-06-16
Payer: MEDICARE

## 2025-06-16 VITALS
TEMPERATURE: 96.6 F | DIASTOLIC BLOOD PRESSURE: 80 MMHG | SYSTOLIC BLOOD PRESSURE: 120 MMHG | HEART RATE: 74 BPM | WEIGHT: 229 LBS | HEIGHT: 72 IN | OXYGEN SATURATION: 97 % | BODY MASS INDEX: 31.02 KG/M2

## 2025-06-16 PROCEDURE — 99214 OFFICE O/P EST MOD 30 MIN: CPT

## 2025-06-17 LAB
ANION GAP SERPL CALC-SCNC: 15 MMOL/L
APPEARANCE: CLEAR
BACTERIA: NEGATIVE /HPF
BILIRUBIN URINE: NEGATIVE
BLOOD URINE: NEGATIVE
BUN SERPL-MCNC: 20 MG/DL
CALCIUM SERPL-MCNC: 9.5 MG/DL
CAST: 0 /LPF
CHLORIDE SERPL-SCNC: 99 MMOL/L
CO2 SERPL-SCNC: 25 MMOL/L
COLOR: NORMAL
CREAT SERPL-MCNC: 0.99 MG/DL
EGFRCR SERPLBLD CKD-EPI 2021: 80 ML/MIN/1.73M2
EPITHELIAL CELLS: 1 /HPF
GLUCOSE QUALITATIVE U: NEGATIVE MG/DL
GLUCOSE SERPL-MCNC: 81 MG/DL
HCT VFR BLD CALC: 49.1 %
HGB BLD-MCNC: 15.8 G/DL
KETONES URINE: NEGATIVE MG/DL
LEUKOCYTE ESTERASE URINE: ABNORMAL
MCHC RBC-ENTMCNC: 29.6 PG
MCHC RBC-ENTMCNC: 32.2 G/DL
MCV RBC AUTO: 92.1 FL
MICROSCOPIC-UA: NORMAL
NITRITE URINE: NEGATIVE
PH URINE: 5.5
PLATELET # BLD AUTO: 204 K/UL
POTASSIUM SERPL-SCNC: 4 MMOL/L
PROTEIN URINE: NORMAL MG/DL
PSA FREE FLD-MCNC: 24 %
PSA FREE SERPL-MCNC: 0.94 NG/ML
PSA SERPL-MCNC: 3.87 NG/ML
RBC # BLD: 5.33 M/UL
RBC # FLD: 14.3 %
RED BLOOD CELLS URINE: 3 /HPF
SODIUM SERPL-SCNC: 138 MMOL/L
SPECIFIC GRAVITY URINE: 1.02
UROBILINOGEN URINE: 0.2 MG/DL
WBC # FLD AUTO: 11.24 K/UL
WHITE BLOOD CELLS URINE: 1 /HPF

## 2025-06-18 LAB — BACTERIA UR CULT: NORMAL

## 2025-06-19 ENCOUNTER — APPOINTMENT (OUTPATIENT)
Dept: CARDIOLOGY | Facility: CLINIC | Age: 75
End: 2025-06-19

## 2025-06-19 VITALS
SYSTOLIC BLOOD PRESSURE: 148 MMHG | HEIGHT: 72 IN | BODY MASS INDEX: 31.15 KG/M2 | DIASTOLIC BLOOD PRESSURE: 78 MMHG | WEIGHT: 230 LBS | OXYGEN SATURATION: 97 % | HEART RATE: 71 BPM

## 2025-06-19 PROCEDURE — 99214 OFFICE O/P EST MOD 30 MIN: CPT

## 2025-06-19 PROCEDURE — 93000 ELECTROCARDIOGRAM COMPLETE: CPT

## 2025-06-26 ENCOUNTER — RX RENEWAL (OUTPATIENT)
Age: 75
End: 2025-06-26

## 2025-06-28 ENCOUNTER — APPOINTMENT (OUTPATIENT)
Dept: MRI IMAGING | Facility: IMAGING CENTER | Age: 75
End: 2025-06-28

## 2025-06-28 ENCOUNTER — OUTPATIENT (OUTPATIENT)
Dept: OUTPATIENT SERVICES | Facility: HOSPITAL | Age: 75
LOS: 1 days | End: 2025-06-28
Payer: MEDICARE

## 2025-06-28 DIAGNOSIS — R97.20 ELEVATED PROSTATE SPECIFIC ANTIGEN [PSA]: ICD-10-CM

## 2025-06-28 DIAGNOSIS — Z98.890 OTHER SPECIFIED POSTPROCEDURAL STATES: Chronic | ICD-10-CM

## 2025-06-28 DIAGNOSIS — Z41.9 ENCOUNTER FOR PROCEDURE FOR PURPOSES OTHER THAN REMEDYING HEALTH STATE, UNSPECIFIED: Chronic | ICD-10-CM

## 2025-06-28 DIAGNOSIS — Z96.643 PRESENCE OF ARTIFICIAL HIP JOINT, BILATERAL: Chronic | ICD-10-CM

## 2025-06-28 PROCEDURE — 72197 MRI PELVIS W/O & W/DYE: CPT

## 2025-06-28 PROCEDURE — 72197 MRI PELVIS W/O & W/DYE: CPT | Mod: 26

## 2025-06-28 PROCEDURE — 76498P: CUSTOM | Mod: 26

## 2025-06-28 PROCEDURE — 76498 UNLISTED MR PROCEDURE: CPT

## 2025-06-28 PROCEDURE — A9585: CPT

## 2025-07-01 ENCOUNTER — APPOINTMENT (OUTPATIENT)
Dept: FAMILY MEDICINE | Facility: CLINIC | Age: 75
End: 2025-07-01
Payer: MEDICARE

## 2025-07-01 ENCOUNTER — APPOINTMENT (OUTPATIENT)
Dept: CARDIOLOGY | Facility: CLINIC | Age: 75
End: 2025-07-01
Payer: MEDICARE

## 2025-07-01 VITALS
OXYGEN SATURATION: 97 % | WEIGHT: 224 LBS | BODY MASS INDEX: 30.34 KG/M2 | HEART RATE: 75 BPM | SYSTOLIC BLOOD PRESSURE: 118 MMHG | TEMPERATURE: 97.2 F | HEIGHT: 72 IN | DIASTOLIC BLOOD PRESSURE: 72 MMHG

## 2025-07-01 PROBLEM — Z01.818 PREOP EXAMINATION: Status: ACTIVE | Noted: 2025-07-01

## 2025-07-01 PROCEDURE — 93306 TTE W/DOPPLER COMPLETE: CPT

## 2025-07-01 PROCEDURE — 36415 COLL VENOUS BLD VENIPUNCTURE: CPT

## 2025-07-01 PROCEDURE — G2211 COMPLEX E/M VISIT ADD ON: CPT

## 2025-07-01 PROCEDURE — 99214 OFFICE O/P EST MOD 30 MIN: CPT

## 2025-07-02 ENCOUNTER — APPOINTMENT (OUTPATIENT)
Dept: CARDIOLOGY | Facility: CLINIC | Age: 75
End: 2025-07-02
Payer: MEDICARE

## 2025-07-02 PROBLEM — E11.9 TYPE 2 DIABETES MELLITUS WITHOUT COMPLICATIONS: Chronic | Status: ACTIVE | Noted: 2025-07-01

## 2025-07-02 PROBLEM — I44.7 LEFT BUNDLE-BRANCH BLOCK, UNSPECIFIED: Chronic | Status: ACTIVE | Noted: 2025-07-01

## 2025-07-02 PROBLEM — I35.0 NONRHEUMATIC AORTIC (VALVE) STENOSIS: Chronic | Status: ACTIVE | Noted: 2025-07-01

## 2025-07-02 LAB
ESTIMATED AVERAGE GLUCOSE: 123 MG/DL
HBA1C MFR BLD HPLC: 5.9 %

## 2025-07-02 PROCEDURE — 99212 OFFICE O/P EST SF 10 MIN: CPT | Mod: 2W

## 2025-07-02 PROCEDURE — G2211 COMPLEX E/M VISIT ADD ON: CPT | Mod: 2W

## 2025-07-07 RX ORDER — FINASTERIDE 1 MG/1
1 TABLET ORAL
Qty: 90 | Refills: 1 | Status: ACTIVE | COMMUNITY
Start: 2025-07-07 | End: 1900-01-01

## 2025-07-08 ENCOUNTER — APPOINTMENT (OUTPATIENT)
Dept: UROLOGY | Facility: HOSPITAL | Age: 75
End: 2025-07-08

## 2025-07-08 ENCOUNTER — RESULT REVIEW (OUTPATIENT)
Age: 75
End: 2025-07-08

## 2025-07-08 ENCOUNTER — TRANSCRIPTION ENCOUNTER (OUTPATIENT)
Age: 75
End: 2025-07-08

## 2025-07-08 ENCOUNTER — OUTPATIENT (OUTPATIENT)
Dept: OUTPATIENT SERVICES | Facility: HOSPITAL | Age: 75
LOS: 1 days | End: 2025-07-08
Payer: MEDICARE

## 2025-07-08 VITALS
SYSTOLIC BLOOD PRESSURE: 127 MMHG | RESPIRATION RATE: 16 BRPM | HEART RATE: 65 BPM | DIASTOLIC BLOOD PRESSURE: 85 MMHG | OXYGEN SATURATION: 96 % | WEIGHT: 223.99 LBS | TEMPERATURE: 98 F | HEIGHT: 73 IN

## 2025-07-08 VITALS
SYSTOLIC BLOOD PRESSURE: 134 MMHG | DIASTOLIC BLOOD PRESSURE: 70 MMHG | TEMPERATURE: 97 F | RESPIRATION RATE: 15 BRPM | OXYGEN SATURATION: 95 % | HEART RATE: 73 BPM

## 2025-07-08 DIAGNOSIS — Z41.9 ENCOUNTER FOR PROCEDURE FOR PURPOSES OTHER THAN REMEDYING HEALTH STATE, UNSPECIFIED: Chronic | ICD-10-CM

## 2025-07-08 DIAGNOSIS — R97.20 ELEVATED PROSTATE SPECIFIC ANTIGEN [PSA]: ICD-10-CM

## 2025-07-08 DIAGNOSIS — Z96.643 PRESENCE OF ARTIFICIAL HIP JOINT, BILATERAL: Chronic | ICD-10-CM

## 2025-07-08 DIAGNOSIS — Z98.890 OTHER SPECIFIED POSTPROCEDURAL STATES: Chronic | ICD-10-CM

## 2025-07-08 LAB
GLUCOSE BLDC GLUCOMTR-MCNC: 112 MG/DL — HIGH (ref 70–99)
GLUCOSE BLDC GLUCOMTR-MCNC: 121 MG/DL — HIGH (ref 70–99)

## 2025-07-08 PROCEDURE — 88344 IMHCHEM/IMCYTCHM EA MLT ANTB: CPT | Mod: 26

## 2025-07-08 PROCEDURE — 76999F: CUSTOM | Mod: 26

## 2025-07-08 PROCEDURE — 55706 BX PRST8 NDL SAT SAMPLING: CPT

## 2025-07-08 PROCEDURE — 55706 BX PRST8 NDL SAT SAMPLING: CPT | Mod: AS

## 2025-07-08 PROCEDURE — 82962 GLUCOSE BLOOD TEST: CPT

## 2025-07-08 PROCEDURE — 88344 IMHCHEM/IMCYTCHM EA MLT ANTB: CPT

## 2025-07-08 PROCEDURE — G0416: CPT

## 2025-07-08 PROCEDURE — 76999 ECHO EXAMINATION PROCEDURE: CPT | Mod: 26,AS

## 2025-07-08 PROCEDURE — G0416: CPT | Mod: 26

## 2025-07-08 PROCEDURE — 64450 NJX AA&/STRD OTHER PN/BRANCH: CPT | Mod: 50,XU

## 2025-07-08 RX ORDER — SODIUM CHLORIDE 9 G/1000ML
1000 INJECTION, SOLUTION INTRAVENOUS
Refills: 0 | Status: DISCONTINUED | OUTPATIENT
Start: 2025-07-08 | End: 2025-07-08

## 2025-07-08 RX ORDER — OXYCODONE HYDROCHLORIDE 30 MG/1
5 TABLET ORAL ONCE
Refills: 0 | Status: DISCONTINUED | OUTPATIENT
Start: 2025-07-08 | End: 2025-07-08

## 2025-07-08 RX ORDER — HYDROMORPHONE/SOD CHLOR,ISO/PF 2 MG/10 ML
0.5 SYRINGE (ML) INJECTION
Refills: 0 | Status: DISCONTINUED | OUTPATIENT
Start: 2025-07-08 | End: 2025-07-08

## 2025-07-08 RX ORDER — ONDANSETRON HCL/PF 4 MG/2 ML
4 VIAL (ML) INJECTION ONCE
Refills: 0 | Status: DISCONTINUED | OUTPATIENT
Start: 2025-07-08 | End: 2025-07-08

## 2025-07-08 RX ORDER — HYDROMORPHONE/SOD CHLOR,ISO/PF 2 MG/10 ML
1 SYRINGE (ML) INJECTION
Refills: 0 | Status: DISCONTINUED | OUTPATIENT
Start: 2025-07-08 | End: 2025-07-08

## 2025-07-08 RX ADMIN — SODIUM CHLORIDE 50 MILLILITER(S): 9 INJECTION, SOLUTION INTRAVENOUS at 08:22

## 2025-07-08 NOTE — ASU PATIENT PROFILE, ADULT - NSICDXPASTMEDICALHX_GEN_ALL_CORE_FT
PAST MEDICAL HISTORY:  AS (aortic stenosis)     Bladder stones     BPH (benign prostatic hyperplasia)     CAD, multiple vessel with 2 stents 5/2020    DM2 (diabetes mellitus, type 2)     Hematuria     HLD (hyperlipidemia)     HTN (hypertension)     LBBB (left bundle branch block)     Sleep apnea uses cpap    Stroke 2001

## 2025-07-08 NOTE — ASU DISCHARGE PLAN (ADULT/PEDIATRIC) - FINANCIAL ASSISTANCE
[facility] provides services at a reduced cost to those who are determined to be eligible through [facility]’s financial assistance program. Information regarding [facility]’s financial assistance program can be found by going to [email or web address] or by calling [phone].

## 2025-07-08 NOTE — ASU DISCHARGE PLAN (ADULT/PEDIATRIC) - ASU DC SPECIAL INSTRUCTIONSFT
Please see Dr. Rivera or your own urologist for pathology results    Expect Blood in stool and urine for the next few days. Change dressing and keep covered as necessary with gauze and bacitracin. For pain take tylenol as directed on bottle every 4-6 hours for pain. Ice to area as needed.

## 2025-07-08 NOTE — ASU DISCHARGE PLAN (ADULT/PEDIATRIC) - NS MD DC FALL RISK RISK
For information on Fall & Injury Prevention, visit: [email or web address] OR  [email or web address] OR  https://www.cdc.gov/steadi/patient.html
No

## 2025-07-08 NOTE — ASU PATIENT PROFILE, ADULT - FALL HARM RISK - RISK INTERVENTIONS

## 2025-07-09 ENCOUNTER — OUTPATIENT (OUTPATIENT)
Dept: OUTPATIENT SERVICES | Facility: HOSPITAL | Age: 75
LOS: 1 days | End: 2025-07-09
Payer: MEDICARE

## 2025-07-09 ENCOUNTER — RESULT REVIEW (OUTPATIENT)
Age: 75
End: 2025-07-09

## 2025-07-09 DIAGNOSIS — Z96.643 PRESENCE OF ARTIFICIAL HIP JOINT, BILATERAL: Chronic | ICD-10-CM

## 2025-07-09 DIAGNOSIS — R97.20 ELEVATED PROSTATE SPECIFIC ANTIGEN [PSA]: ICD-10-CM

## 2025-07-09 DIAGNOSIS — Z98.890 OTHER SPECIFIED POSTPROCEDURAL STATES: Chronic | ICD-10-CM

## 2025-07-09 DIAGNOSIS — Z41.9 ENCOUNTER FOR PROCEDURE FOR PURPOSES OTHER THAN REMEDYING HEALTH STATE, UNSPECIFIED: Chronic | ICD-10-CM

## 2025-07-09 PROCEDURE — C8001: CPT

## 2025-07-17 LAB — SURGICAL PATHOLOGY STUDY: SIGNIFICANT CHANGE UP

## 2025-07-21 ENCOUNTER — NON-APPOINTMENT (OUTPATIENT)
Age: 75
End: 2025-07-21

## 2025-07-22 ENCOUNTER — APPOINTMENT (OUTPATIENT)
Dept: BARIATRICS | Facility: CLINIC | Age: 75
End: 2025-07-22
Payer: MEDICARE

## 2025-07-22 VITALS
HEART RATE: 81 BPM | OXYGEN SATURATION: 97 % | WEIGHT: 226.41 LBS | SYSTOLIC BLOOD PRESSURE: 134 MMHG | DIASTOLIC BLOOD PRESSURE: 84 MMHG | HEIGHT: 72 IN | TEMPERATURE: 97.3 F | BODY MASS INDEX: 30.67 KG/M2

## 2025-07-22 DIAGNOSIS — R63.4 ABNORMAL WEIGHT LOSS: ICD-10-CM

## 2025-07-22 DIAGNOSIS — E11.9 TYPE 2 DIABETES MELLITUS W/OUT COMPLICATIONS: ICD-10-CM

## 2025-07-22 DIAGNOSIS — E46 UNSPECIFIED PROTEIN-CALORIE MALNUTRITION: ICD-10-CM

## 2025-07-22 DIAGNOSIS — E66.9 OBESITY, UNSPECIFIED: ICD-10-CM

## 2025-07-22 PROCEDURE — 99213 OFFICE O/P EST LOW 20 MIN: CPT

## 2025-08-13 ENCOUNTER — APPOINTMENT (OUTPATIENT)
Dept: UROLOGY | Facility: CLINIC | Age: 75
End: 2025-08-13

## 2025-08-19 ENCOUNTER — APPOINTMENT (OUTPATIENT)
Dept: BARIATRICS | Facility: CLINIC | Age: 75
End: 2025-08-19

## 2025-08-26 ENCOUNTER — APPOINTMENT (OUTPATIENT)
Dept: INTERNAL MEDICINE | Facility: CLINIC | Age: 75
End: 2025-08-26
Payer: MEDICARE

## 2025-08-26 DIAGNOSIS — N40.1 BENIGN PROSTATIC HYPERPLASIA WITH LOWER URINARY TRACT SYMPMS: ICD-10-CM

## 2025-08-26 DIAGNOSIS — N13.8 BENIGN PROSTATIC HYPERPLASIA WITH LOWER URINARY TRACT SYMPMS: ICD-10-CM

## 2025-08-26 DIAGNOSIS — E78.5 HYPERLIPIDEMIA, UNSPECIFIED: ICD-10-CM

## 2025-08-26 DIAGNOSIS — E11.9 TYPE 2 DIABETES MELLITUS W/OUT COMPLICATIONS: ICD-10-CM

## 2025-08-26 PROCEDURE — G2211 COMPLEX E/M VISIT ADD ON: CPT | Mod: 93

## 2025-08-26 PROCEDURE — 99214 OFFICE O/P EST MOD 30 MIN: CPT | Mod: 93

## 2025-08-27 ENCOUNTER — APPOINTMENT (OUTPATIENT)
Dept: UROLOGY | Facility: CLINIC | Age: 75
End: 2025-08-27

## 2025-08-29 LAB
ALBUMIN SERPL ELPH-MCNC: 4.1 G/DL
ALBUMIN, RANDOM URINE: 3.4 MG/DL
ALP BLD-CCNC: 66 U/L
ALT SERPL-CCNC: 12 U/L
APPEARANCE: CLEAR
AST SERPL-CCNC: 16 U/L
BACTERIA: NEGATIVE /HPF
BILIRUB DIRECT SERPL-MCNC: 0.22 MG/DL
BILIRUB INDIRECT SERPL-MCNC: 0.6 MG/DL
BILIRUB SERPL-MCNC: 0.8 MG/DL
BILIRUBIN URINE: NEGATIVE
BLOOD URINE: ABNORMAL
CAST: 0 /LPF
CHOLEST SERPL-MCNC: 243 MG/DL
COLOR: NORMAL
CREAT SPEC-SCNC: 182 MG/DL
EPITHELIAL CELLS: 0 /HPF
GLUCOSE QUALITATIVE U: NEGATIVE MG/DL
HDLC SERPL-MCNC: 40 MG/DL
KETONES URINE: NEGATIVE MG/DL
LDLC SERPL-MCNC: 167 MG/DL
LEUKOCYTE ESTERASE URINE: ABNORMAL
MICROALBUMIN/CREAT 24H UR-RTO: 19 MG/G
MICROSCOPIC-UA: NORMAL
NITRITE URINE: NEGATIVE
NONHDLC SERPL-MCNC: 203 MG/DL
PH URINE: 5.5
PROT SERPL-MCNC: 6.6 G/DL
PROTEIN URINE: NORMAL MG/DL
RED BLOOD CELLS URINE: 47 /HPF
REVIEW: NORMAL
SPECIFIC GRAVITY URINE: 1.02
TRIGL SERPL-MCNC: 192 MG/DL
UROBILINOGEN URINE: 1 MG/DL
WHITE BLOOD CELLS URINE: 2 /HPF

## 2025-09-04 ENCOUNTER — APPOINTMENT (OUTPATIENT)
Facility: CLINIC | Age: 75
End: 2025-09-04

## 2025-09-04 VITALS — HEIGHT: 72 IN | WEIGHT: 226 LBS | BODY MASS INDEX: 30.61 KG/M2

## 2025-09-04 DIAGNOSIS — R31.9 HEMATURIA, UNSPECIFIED: ICD-10-CM

## 2025-09-04 DIAGNOSIS — M17.12 UNILATERAL PRIMARY OSTEOARTHRITIS, LEFT KNEE: ICD-10-CM

## 2025-09-04 PROCEDURE — 99204 OFFICE O/P NEW MOD 45 MIN: CPT

## 2025-09-04 PROCEDURE — 73564 X-RAY EXAM KNEE 4 OR MORE: CPT | Mod: LT

## 2025-09-12 ENCOUNTER — TRANSCRIPTION ENCOUNTER (OUTPATIENT)
Age: 75
End: 2025-09-12

## 2025-09-19 ENCOUNTER — TRANSCRIPTION ENCOUNTER (OUTPATIENT)
Age: 75
End: 2025-09-19

## (undated) DEVICE — NDL MAX CORE 18G X 25CM

## (undated) DEVICE — POSITIONER FOAM HEAD CRADLE (PINK)

## (undated) DEVICE — Device

## (undated) DEVICE — PACK CYSTO

## (undated) DEVICE — SYR LUER SLIP TIP 50CC

## (undated) DEVICE — BALLOON ENDOCAVITY 2X14CM

## (undated) DEVICE — NDL SPINAL 20G X 6" QUINCKE

## (undated) DEVICE — VENODYNE/SCD SLEEVE CALF LARGE

## (undated) DEVICE — GRID BRACHYTHERAPY EZ 18G

## (undated) DEVICE — WARMING BLANKET UPPER ADULT